# Patient Record
Sex: FEMALE | Race: WHITE | NOT HISPANIC OR LATINO | Employment: PART TIME | URBAN - METROPOLITAN AREA
[De-identification: names, ages, dates, MRNs, and addresses within clinical notes are randomized per-mention and may not be internally consistent; named-entity substitution may affect disease eponyms.]

---

## 2018-09-21 ENCOUNTER — TELEPHONE (OUTPATIENT)
Dept: NEUROLOGY | Facility: CLINIC | Age: 28
End: 2018-09-21

## 2018-12-11 ENCOUNTER — OFFICE VISIT (OUTPATIENT)
Dept: NEUROLOGY | Facility: CLINIC | Age: 28
End: 2018-12-11
Payer: COMMERCIAL

## 2018-12-11 VITALS
WEIGHT: 227 LBS | HEIGHT: 65 IN | DIASTOLIC BLOOD PRESSURE: 86 MMHG | BODY MASS INDEX: 37.82 KG/M2 | SYSTOLIC BLOOD PRESSURE: 134 MMHG | HEART RATE: 90 BPM

## 2018-12-11 DIAGNOSIS — G44.89 OTHER HEADACHE SYNDROME: ICD-10-CM

## 2018-12-11 DIAGNOSIS — G35 MS (MULTIPLE SCLEROSIS) (HCC): ICD-10-CM

## 2018-12-11 DIAGNOSIS — G37.9 CNS DEMYELINATING DISEASE (HCC): Primary | ICD-10-CM

## 2018-12-11 PROCEDURE — 99244 OFF/OP CNSLTJ NEW/EST MOD 40: CPT | Performed by: PSYCHIATRY & NEUROLOGY

## 2018-12-11 RX ORDER — DESOGESTREL AND ETHINYL ESTRADIOL 7 DAYS X 3
1 KIT ORAL DAILY
COMMUNITY
Start: 2018-12-10

## 2018-12-11 RX ORDER — LEVOTHYROXINE SODIUM 0.07 MG/1
TABLET ORAL
COMMUNITY
Start: 2018-11-04 | End: 2018-12-11 | Stop reason: SDUPTHER

## 2018-12-11 RX ORDER — GABAPENTIN 100 MG/1
100 CAPSULE ORAL
Qty: 30 CAPSULE | Refills: 5 | Status: SHIPPED | OUTPATIENT
Start: 2018-12-11 | End: 2019-06-15 | Stop reason: SDUPTHER

## 2018-12-11 RX ORDER — LEVOTHYROXINE SODIUM 0.05 MG/1
50 TABLET ORAL DAILY
COMMUNITY
Start: 2018-11-19 | End: 2022-07-22 | Stop reason: SDUPTHER

## 2018-12-11 RX ORDER — SERTRALINE HYDROCHLORIDE 100 MG/1
TABLET, FILM COATED ORAL
COMMUNITY
Start: 2018-10-22 | End: 2018-12-27 | Stop reason: SDUPTHER

## 2018-12-11 RX ORDER — IBUPROFEN 200 MG
TABLET ORAL
COMMUNITY
End: 2019-07-11 | Stop reason: ALTCHOICE

## 2018-12-11 RX ORDER — MELOXICAM 15 MG/1
15 TABLET ORAL DAILY PRN
COMMUNITY
End: 2022-04-08

## 2018-12-11 RX ORDER — METFORMIN HYDROCHLORIDE 500 MG/1
500 TABLET, EXTENDED RELEASE ORAL
COMMUNITY
Start: 2018-11-20 | End: 2021-11-09 | Stop reason: SDUPTHER

## 2018-12-11 RX ORDER — HYDROCODONE BITARTRATE AND ACETAMINOPHEN 5; 325 MG/1; MG/1
1 TABLET ORAL
COMMUNITY
Start: 2015-04-08 | End: 2019-07-11 | Stop reason: ALTCHOICE

## 2018-12-11 NOTE — PROGRESS NOTES
Raffy Coats is a 25 y o  female  Chief Complaint   Patient presents with    Multiple Sclerosis     New patient consult regarding MS  Assessment:  1  CNS demyelinating disease ([facility] )    2  MS (multiple sclerosis) ([facility] )    3  Other headache syndrome        Plan:    Discussion:  Patient with history of MS diagnosed in 2016, she was on Copaxone which she did not tolerate it, and hence she has not been on any medication for the last 2 years, she wants to be on natural medication and is using vitamin supplement, would recommend an MRI scan of the brain and C-spine with and without contrast, I explained to the patient regarding the importance of being on medication, she would not want to be on medication would would like to see an MS specialist, I am going to refer her to see Dr Dedra Morales, also patient to call me after the above test to discuss the result, differential diagnosis of headaches discussed with her most likely migraine headaches, she was advised to avoid migraine triggers, we discussed different medication options, since she is also having some numbness and tingling in her head hands and feet, would recommend Neurontin 100 mg at nighttime, side effects of the medication discussed with her, she will call me if has any worsening symptoms and go to the hospital otherwise to see me back in 2 months and follow up with her other physicians      Subjective:    HPI   Patient is here for evaluation of headaches and history of MS and numbness and tingling in her hands and feet, patient was diagnosed with MS in 2016 at [facility] and had an MRI scan of the brain and spinal tap, she was started on Copaxone but patient did not tolerate the Copaxone and hence she stopped and then she lost her insurance and she wanted to try natural vitamins, her initial presentation was that she was having spasm and numbness and tingling in the left arm and leg, since then she has not had any recurrence of the symptoms, she has complaints of headaches for last several years, they have been slightly getting worse, 3 to 4 times a week, 5-7 on 10 no bowel and bladder incontinence, no focal weakness, no other neurological complaints  Vitals:    12/11/18 1322   Weight: 103 kg (227 lb)   Height: 5' 5" (1 651 m)       Current Medications    Current Outpatient Prescriptions:     ibuprofen (MOTRIN) 200 mg tablet, Take by mouth, Disp: , Rfl:     levothyroxine 50 mcg tablet, , Disp: , Rfl:     meloxicam (MOBIC) 15 mg tablet, Take 15 mg by mouth daily, Disp: , Rfl:     metFORMIN (GLUCOPHAGE-XR) 500 mg 24 hr tablet,  , Disp: , Rfl:     VELIVET 0 1/0 125/0 15 -0 025 MG tablet, , Disp: , Rfl:     HYDROcodone-acetaminophen (NORCO) 5-325 mg per tablet, Take 1 tablet by mouth, Disp: , Rfl:     sertraline (ZOLOFT) 100 mg tablet, , Disp: , Rfl:       Allergies  Patient has no known allergies  Past Medical History  Past Medical History:   Diagnosis Date    Migraine     MS (multiple sclerosis) ([facility] )     Thyroid disease          Past Surgical History:  Past Surgical History:   Procedure Laterality Date    IR SPINE PROCEDURE           Family History:  Family History   Problem Relation Age of Onset    Diabetes Mother     Lupus Mother     Hypertension Mother     Cancer Maternal Grandmother     Heart failure Paternal Grandfather        Social History:   reports that she has been smoking  She uses smokeless tobacco  She reports that she does not drink alcohol or use drugs  I have reviewed the past medical history, surgical history, social and family history, current medications, allergies vitals, review of systems, and updated this information as appropriate today  Objective:    Physical Exam    Neurological Exam    GENERAL:  Cooperative in no acute distress  Well-developed and well-nourished    HEAD and NECK   Head is atraumatic normocephalic with no lesions or masses   Neck is supple with full range of motion    CARDIOVASCULAR  Carotid Arteries-no carotid bruits  NEUROLOGIC:  Mental Status-the patient is awake alert and oriented without aphasia or apraxia  Cranial Nerves: Visual fields are full to confrontation  Discs are flat  Limited exam as unable to dilate the pupils Extraocular movements are full without nystagmus  Pupils are 2-1/2 mm and reactive  Face is symmetrical to light touch  Movements of facial expression move symmetrically  Hearing is normal to finger rub bilaterally  Soft palate lifts symmetrically  Shoulder shrug is symmetrical  Tongue is midline without atrophy  Motor: No drift is noted on arm extension  Strength is full in the upper and lower extremities with normal bulk and tone  Sensory: Intact to temperature and vibratory sensation in the upper and lower extremities bilaterally  Cortical function is intact  Coordination: Finger to nose testing is performed accurately  Romberg is negative  Gait reveals a normal base with symmetrical arm swing  Reflexes:      2+ and symmetrical    Toes are downgoing  No meningeal signs no temporal artery tenderness,          ROS:  Review of Systems   Constitutional: Negative  Negative for appetite change and fever  HENT: Positive for congestion  Negative for hearing loss, tinnitus, trouble swallowing and voice change  Eyes: Negative  Negative for photophobia and pain  Respiratory: Negative  Negative for shortness of breath  Cardiovascular: Negative  Negative for palpitations  Gastrointestinal: Negative  Negative for nausea and vomiting  Endocrine: Negative  Negative for cold intolerance and heat intolerance  Genitourinary: Negative  Negative for dysuria, frequency and urgency  Musculoskeletal: Positive for back pain  Negative for myalgias and neck pain  Pain while walking, muscle pain   Skin: Positive for rash  Neurological: Positive for numbness (hands, fingers, toes) and headaches   Negative for dizziness, tremors, seizures, syncope, facial asymmetry, speech difficulty, weakness and light-headedness  Cramping, twitching, tingling   Hematological: Bruises/bleeds easily (bruising)  Psychiatric/Behavioral: Positive for sleep disturbance  Negative for confusion and hallucinations

## 2018-12-27 DIAGNOSIS — F41.9 ANXIETY AND DEPRESSION: Primary | ICD-10-CM

## 2018-12-27 DIAGNOSIS — F32.A ANXIETY AND DEPRESSION: Primary | ICD-10-CM

## 2018-12-27 RX ORDER — SERTRALINE HYDROCHLORIDE 100 MG/1
100 TABLET, FILM COATED ORAL DAILY
Qty: 30 TABLET | Refills: 11 | Status: SHIPPED | OUTPATIENT
Start: 2018-12-27 | End: 2019-07-11 | Stop reason: ALTCHOICE

## 2019-01-04 ENCOUNTER — HOSPITAL ENCOUNTER (OUTPATIENT)
Dept: MRI IMAGING | Facility: CLINIC | Age: 29
Discharge: HOME | End: 2019-01-04
Payer: COMMERCIAL

## 2019-01-04 DIAGNOSIS — G35 MS (MULTIPLE SCLEROSIS) (HCC): ICD-10-CM

## 2019-01-04 PROCEDURE — 70553 MRI BRAIN STEM W/O & W/DYE: CPT

## 2019-01-04 PROCEDURE — A9585 GADOBUTROL INJECTION: HCPCS | Performed by: PSYCHIATRY & NEUROLOGY

## 2019-01-04 PROCEDURE — 72156 MRI NECK SPINE W/O & W/DYE: CPT

## 2019-01-04 RX ADMIN — GADOBUTROL 10 ML: 604.72 INJECTION INTRAVENOUS at 13:45

## 2019-01-17 ENCOUNTER — OFFICE VISIT (OUTPATIENT)
Dept: NEUROLOGY | Facility: CLINIC | Age: 29
End: 2019-01-17
Payer: COMMERCIAL

## 2019-01-17 VITALS
HEART RATE: 89 BPM | HEIGHT: 65 IN | BODY MASS INDEX: 37.82 KG/M2 | SYSTOLIC BLOOD PRESSURE: 130 MMHG | WEIGHT: 227 LBS | DIASTOLIC BLOOD PRESSURE: 80 MMHG | RESPIRATION RATE: 16 BRPM

## 2019-01-17 DIAGNOSIS — G44.89 OTHER HEADACHE SYNDROME: ICD-10-CM

## 2019-01-17 DIAGNOSIS — G35 MS (MULTIPLE SCLEROSIS) (HCC): Primary | ICD-10-CM

## 2019-01-17 PROCEDURE — 99215 OFFICE O/P EST HI 40 MIN: CPT | Performed by: PSYCHIATRY & NEUROLOGY

## 2019-01-17 NOTE — PROGRESS NOTES
Patient ID: Gaye Zamudio is a 25 y o  female  Assessment/Plan:   Problem List Items Addressed This Visit        Nervous and Auditory    MS (multiple sclerosis) ([facility] ) - Primary       Other    Other headache syndrome           Today I had the pleasure of seeing your patient, Kermit Hernandez, in consultation at [address]  Mrs David Lamb has presented for evaluation of MS and related issues  She has been off DMT for almost 3 years  MRI Brain and cervical spine were completed - we personally reviewed her imaging   No comparison was made due to her initial imaging are at [facility]  Patient has total 18 lesion due to demyelination and migraines, and no cervical spine, with T4 demyelinating plaque reported in 2016  Patient agreed to consider Tecfidera as she was not able to tolerate copaxone in 2016  Form was signed, and she is to call us back  Patient started gabapentin  She was interested in medical marijuana - she was referred to a [employer] official website to address that question  No focal neurologic deficit noted on her today's evaluation  Follow up in 2 months  Subjective: MS and related issues  HPI/History of Present Illness  Patient was diagnosed with MS in 2016 when she presented with left-sided muscle spasms  LP was completed with 6 OCB at that time  Patient was on Copaxone for 1 week  Since 2016 she has not been on any DMT  Gabapentin 100 mg HS  Patient has been having tingling in her hands, no weakness and no vertigo  History of autoimmune disorders in both sides  Patient  has been having frequent migraines   Patient described headaches at times, but not today  Patient had a fever and cold sweats due to Copaxone  She has no muscle spasms now  No FHx of breast Ca  MRI brain 2/2016:Mild focal cord signal abnormality at the T4 level without associated enhancement  In the same location there is a dorsal indentation on the thoracic cord   This most likely reflects an arachnoid which can be associated with cord signal abnormality  A demyelinating plaque is considered less likely but cannot  be excluded in this patient with possible multiple sclerosis  2  Mild to moderate midthoracic spondylosis mainly from T6-T7 through T8-T9  No  central spinal canal stenosis or foraminal narrowing  The following portions of the patient's history were reviewed and updated as appropriate:   She  has a past medical history of Migraine; MS (multiple sclerosis) ([facility] ); and Thyroid disease  She   Patient Active Problem List    Diagnosis Date Noted    CNS demyelinating disease ([facility] ) 12/11/2018    MS (multiple sclerosis) ([facility] ) 12/11/2018    Other headache syndrome 12/11/2018     She  has a past surgical history that includes IR spine procedure  Her family history includes Cancer in her maternal grandmother; Diabetes in her mother; Heart failure in her paternal grandfather; Hypertension in her mother; Lupus in her mother  She  reports that she has been smoking  She uses smokeless tobacco  She reports that she does not drink alcohol or use drugs  Current Outpatient Prescriptions   Medication Sig Dispense Refill    gabapentin (NEURONTIN) 100 mg capsule Take 1 capsule (100 mg total) by mouth daily at bedtime 30 capsule 5    levothyroxine 50 mcg tablet       meloxicam (MOBIC) 15 mg tablet Take 15 mg by mouth daily      metFORMIN (GLUCOPHAGE-XR) 500 mg 24 hr tablet        VELIVET 0 1/0 125/0 15 -0 025 MG tablet       HYDROcodone-acetaminophen (NORCO) 5-325 mg per tablet Take 1 tablet by mouth      ibuprofen (MOTRIN) 200 mg tablet Take by mouth      sertraline (ZOLOFT) 100 mg tablet Take 1 tablet (100 mg total) by mouth daily (Patient not taking: Reported on 1/17/2019 ) 30 tablet 11     No current facility-administered medications for this visit        Current Outpatient Prescriptions on File Prior to Visit   Medication Sig    gabapentin (NEURONTIN) 100 mg capsule Take 1 capsule (100 mg total) by mouth daily at bedtime    levothyroxine 50 mcg tablet     meloxicam (MOBIC) 15 mg tablet Take 15 mg by mouth daily    metFORMIN (GLUCOPHAGE-XR) 500 mg 24 hr tablet      VELIVET 0 1/0 125/0 15 -0 025 MG tablet     HYDROcodone-acetaminophen (NORCO) 5-325 mg per tablet Take 1 tablet by mouth    ibuprofen (MOTRIN) 200 mg tablet Take by mouth    sertraline (ZOLOFT) 100 mg tablet Take 1 tablet (100 mg total) by mouth daily (Patient not taking: Reported on 1/17/2019 )     No current facility-administered medications on file prior to visit  She has No Known Allergies            Objective:    Blood pressure 130/80, pulse 89, resp  rate 16, height 5' 5" (1 651 m), weight 103 kg (227 lb)  Physical Exam/Neurological Exam  CONSTITUTIONAL: NAD, pleasant  NECK: supple, no lymphadenopathy, no thyromegaly, no JVD  CARDIOVASCULAR: RRR, normal S1S2, no murmurs, no rubs  RESP: clear to auscultation bilaterally, no wheezes/rhonchi/rales  ABDOMEN: soft, non tender, non distended  SKIN: no rash or skin lesions  EXTREMITIES: no edema, pulses 2+bilaterally  PSYCH: appropriate mood and affect  NEUROLOGIC COMPREHENSIVE EXAM: Patient is oriented to person, place and time, NAD; appropriate affect  CN II, III, IV, V, VI, VII,VIII,IX,X,XI-XII intact with EOMI, PERRLA, OKN intact, VF grossly intact, fundi poorly visualized secondary to pupillary constriction; symmetric face noted  Motor: 5/5 UE/LE bilateral symmetric; Sensory: intact to light touch and pinprick bilaterally; normal vibration sensation feet bilaterally; Coordination within normal limits on FTN and FERNANDO testing; DTR: 2/4 through, no Babinski, no clonus  Tandem gait is intact  Romberg: negative  ROS:  12 points of review of system was reviewed with the patient and was unremarkable with exception: see HPI  Review of Systems   Constitutional: Negative  HENT: Negative  Eyes: Negative  Respiratory: Negative  Cardiovascular: Negative  Gastrointestinal: Negative  Endocrine: Negative  Genitourinary: Negative  Musculoskeletal: Negative  Allergic/Immunologic: Negative  Neurological: Positive for numbness (R/L Hands) and headaches  Hematological: Negative  Psychiatric/Behavioral: Negative

## 2019-01-18 ENCOUNTER — TELEPHONE (OUTPATIENT)
Dept: NEUROLOGY | Facility: CLINIC | Age: 29
End: 2019-01-18

## 2019-01-24 NOTE — TELEPHONE ENCOUNTER
Patient now requesting to change to copaxone, she did some research on both meds and copaxone "seems better"  She is requesting we fax  rx to the specialty pharmacy (she couldn't remember the name of the pharm)  When I put in the fax number [place] [address]    fax [phone]   Phone [phone] x [phone]

## 2019-01-24 NOTE — TELEPHONE ENCOUNTER
Please consider sending Copaxone form to the patient for signature, cancel Tecfidera shipment if already started

## 2019-01-25 NOTE — TELEPHONE ENCOUNTER
Spoke with patient, she had cancelled her script with the pharmacy  Copaxone Start form mailed to patient's home  Advised her to sign and mail back as soon as possible

## 2019-01-25 NOTE — TELEPHONE ENCOUNTER
Glenna w/[employer] calling to confirm the patient does not need tecfidera  I did inform her the patient changed her mind and now wants to go on copaxone and the start form was mailed to patient  She verbalized understanding, no further questions

## 2019-01-29 NOTE — TELEPHONE ENCOUNTER
Glenna w/ [employer] called re: below  She will call back next week to f/u  Pt states that she did not receive copaxone start form yet  Advised pt that once she receive the form, she will need to sign and mail it back asap  Pt verbalized understanding             [phone] ext [phone]

## 2019-01-31 NOTE — TELEPHONE ENCOUNTER
[facility]  i made her aware that we are still waiting for pt to sign start form

## 2019-02-05 NOTE — TELEPHONE ENCOUNTER
Start form completed, signed by Dr Tiffany Bright and faxed to [facility] and [facility]

## 2019-02-07 DIAGNOSIS — G35 MS (MULTIPLE SCLEROSIS) (HCC): Primary | ICD-10-CM

## 2019-02-07 RX ORDER — GLATIRAMER 40 MG/ML
40 INJECTION, SOLUTION SUBCUTANEOUS 3 TIMES WEEKLY
Qty: 12 SYRINGE | Refills: 0 | Status: SHIPPED | OUTPATIENT
Start: 2019-02-08 | End: 2019-03-13 | Stop reason: SDUPTHER

## 2019-02-11 NOTE — TELEPHONE ENCOUNTER
Received a call from Hema Soto from [address] [place] [address] that they hadn't received the patient's rx for copaxone  Informed her the start form was sent to [facility] and [facility]  She was questioning why it wasn't sent to them

## 2019-02-11 NOTE — TELEPHONE ENCOUNTER
Was not sent directly to them because patient must be set up through [facility] to receive the injector and injection training prior to having medication shipped to her  They will forward to appropriate pharmacy

## 2019-03-13 DIAGNOSIS — G35 MS (MULTIPLE SCLEROSIS) (HCC): ICD-10-CM

## 2019-03-13 RX ORDER — GLATIRAMER 40 MG/ML
INJECTION, SOLUTION SUBCUTANEOUS
Qty: 12 ML | Refills: 0 | Status: SHIPPED | OUTPATIENT
Start: 2019-03-13 | End: 2019-03-15 | Stop reason: SDUPTHER

## 2019-03-15 DIAGNOSIS — G35 MS (MULTIPLE SCLEROSIS) (HCC): ICD-10-CM

## 2019-03-15 RX ORDER — GLATIRAMER 40 MG/ML
INJECTION, SOLUTION SUBCUTANEOUS
Qty: 12 ML | Refills: 0 | Status: SHIPPED | OUTPATIENT
Start: 2019-03-15 | End: 2019-05-30 | Stop reason: SDUPTHER

## 2019-04-03 ENCOUNTER — TELEPHONE (OUTPATIENT)
Dept: FAMILY MEDICINE CLINIC | Facility: CLINIC | Age: 29
End: 2019-04-03

## 2019-04-03 DIAGNOSIS — J01.00 ACUTE NON-RECURRENT MAXILLARY SINUSITIS: Primary | ICD-10-CM

## 2019-04-03 RX ORDER — CIPROFLOXACIN 500 MG/1
500 TABLET, FILM COATED ORAL EVERY 12 HOURS SCHEDULED
Qty: 20 TABLET | Refills: 0 | Status: SHIPPED | OUTPATIENT
Start: 2019-04-03 | End: 2019-04-13

## 2019-05-30 DIAGNOSIS — G35 MS (MULTIPLE SCLEROSIS) (HCC): ICD-10-CM

## 2019-05-30 RX ORDER — GLATIRAMER 40 MG/ML
INJECTION, SOLUTION SUBCUTANEOUS
Qty: 12 ML | Refills: 11 | Status: SHIPPED | OUTPATIENT
Start: 2019-05-30 | End: 2020-02-18 | Stop reason: SDUPTHER

## 2019-06-15 DIAGNOSIS — G35 MS (MULTIPLE SCLEROSIS) (HCC): ICD-10-CM

## 2019-06-17 RX ORDER — GABAPENTIN 100 MG/1
CAPSULE ORAL
Qty: 30 CAPSULE | Refills: 4 | Status: SHIPPED | OUTPATIENT
Start: 2019-06-17 | End: 2019-11-11 | Stop reason: SDUPTHER

## 2019-07-11 ENCOUNTER — OFFICE VISIT (OUTPATIENT)
Dept: NEUROLOGY | Facility: CLINIC | Age: 29
End: 2019-07-11
Payer: COMMERCIAL

## 2019-07-11 VITALS
HEIGHT: 65 IN | DIASTOLIC BLOOD PRESSURE: 70 MMHG | BODY MASS INDEX: 34.99 KG/M2 | SYSTOLIC BLOOD PRESSURE: 110 MMHG | HEART RATE: 80 BPM | WEIGHT: 210 LBS

## 2019-07-11 DIAGNOSIS — M54.50 CHRONIC BILATERAL LOW BACK PAIN WITHOUT SCIATICA: ICD-10-CM

## 2019-07-11 DIAGNOSIS — G37.9 CNS DEMYELINATING DISEASE (HCC): ICD-10-CM

## 2019-07-11 DIAGNOSIS — G89.29 CHRONIC BILATERAL LOW BACK PAIN WITHOUT SCIATICA: ICD-10-CM

## 2019-07-11 DIAGNOSIS — G35 MS (MULTIPLE SCLEROSIS) (HCC): Primary | ICD-10-CM

## 2019-07-11 PROCEDURE — 99214 OFFICE O/P EST MOD 30 MIN: CPT | Performed by: PSYCHIATRY & NEUROLOGY

## 2019-07-11 NOTE — PROGRESS NOTES
Shonda Naqvi is a 30 y o  female  Chief Complaint   Patient presents with    Follow-up     history of MS    Back Pain       Assessment:  1  MS (multiple sclerosis) ([facility] )    2  CNS demyelinating disease ([facility] )    3  Chronic bilateral low back pain without sciatica          Discussion:  Patient is currently on Copaxone which is being managed by Dr Moose Russo, she was advised to keep an eye on of injection sites as occasionally she might have a slight bruise, also was advised to get her blood work monitored periodically, would recommend an MRI scan of the lumbar spine to evaluate for the low back pain, patient to call me after the test to discuss the results, she was also advised to go for physical therapy, to avoid strenuous activity, continue with Mobic on as-needed basis, avoid ibuprofen, as she is using Mobic, also advised her to avoid tramadol, she is not keen to increase Neurontin, go to the hospital if has any worsening symptoms and call me otherwise to see me back in 3 months and follow up with her other physicians  Subjective:    HPI   Patient is here in follow-up for history of MS and low back pain, since her last visit she was seen by Dr Moose Russo, and was started on Copaxone, patient continues to take that and is doing well, except that very rarely she would have some numbness and tingling in her fingertips, she has a slight bruise at times at the injection site but nothing major, she has been complaining of low back pain which she has had for a long time and has tried physical therapy in the past but it was doing well and recently she has been having worsening low back pain, she denies having any trauma, pain is 9 on 10 without radiation to the legs, is relieved with Tylenol and occasionally she might take a tramadol, no bowel and bladder incontinence, no focal weakness, no other neurological complaints      Vitals:    07/11/19 1501   BP: 110/70   BP Location: Left arm   Patient Position: Sitting Cuff Size: Adult   Pulse: 80   Weight: 95 3 kg (210 lb)   Height: 5' 5" (1 651 m)       Current Medications    Current Outpatient Medications:     gabapentin (NEURONTIN) 100 mg capsule, TAKE ONE CAPSULE BY MOUTH EVERY DAY AT BEDTIME , Disp: 30 capsule, Rfl: 4    Glatiramer Acetate 40 MG/ML SOSY, INJECT 40 MG UNDER THE SKIN 3 TIMES A WEEK, Disp: 12 mL, Rfl: 11    levothyroxine 50 mcg tablet, Take 50 mcg by mouth daily , Disp: , Rfl:     meloxicam (MOBIC) 15 mg tablet, Take 15 mg by mouth daily as needed , Disp: , Rfl:     metFORMIN (GLUCOPHAGE-XR) 500 mg 24 hr tablet,  , Disp: , Rfl:     VELIVET 0 1/0 125/0 15 -0 025 MG tablet, Take 1 tablet by mouth daily , Disp: , Rfl:       Allergies  Patient has no known allergies  Past Medical History  Past Medical History:   Diagnosis Date    Migraine     MS (multiple sclerosis) [facility] )     Thyroid disease          Past Surgical History:  Past Surgical History:   Procedure Laterality Date    IR SPINE PROCEDURE           Family History:  Family History   Problem Relation Age of Onset    Diabetes Mother     Lupus Mother     Hypertension Mother     Cancer Maternal Grandmother     Heart failure Paternal Grandfather        Social History:   reports that she has been smoking  She uses smokeless tobacco  She reports that she does not drink alcohol or use drugs  I have reviewed the past medical history, surgical history, social and family history, current medications, allergies vitals, review of systems, and updated this information as appropriate today  Objective:    Physical Exam    Neurological Exam    GENERAL:  Cooperative in no acute distress  Well-developed and well-nourished    HEAD and NECK   Head is atraumatic normocephalic with no lesions or masses  Neck is supple with full range of motion    CARDIOVASCULAR  Carotid Arteries-no carotid bruits      NEUROLOGIC:  Mental Status-the patient is awake alert and oriented without aphasia or apraxia  Cranial Nerves: Visual fields are full to confrontation  Extraocular movements are full without nystagmus  Pupils are 2-1/2 mm and reactive  Face is symmetrical to light touch  Movements of facial expression move symmetrically  Hearing is normal to finger rub bilaterally  Soft palate lifts symmetrically  Shoulder shrug is symmetrical  Tongue is midline without atrophy  Motor: No drift is noted on arm extension  Strength is full in the upper and lower extremities with normal bulk and tone  Sensory: Intact to temperature and vibratory sensation in the upper and lower extremities bilaterally  Cortical function is intact  Coordination: Finger to nose testing is performed accurately  Romberg is negative  Gait reveals a normal base with symmetrical arm swing  Tandem walk is normal   Reflexes:   1+ and symmetrical   Paraspinal muscle tenderness in the lumbar area          ROS:  Review of Systems   Constitutional: Negative  Negative for appetite change, chills and fever  HENT: Negative  Negative for hearing loss, tinnitus, trouble swallowing and voice change  Eyes: Negative  Negative for photophobia and pain  Respiratory: Negative  Negative for shortness of breath and wheezing  Cardiovascular: Negative  Negative for chest pain and palpitations  Gastrointestinal: Negative  Negative for nausea and vomiting  Endocrine: Negative  Negative for cold intolerance and heat intolerance  Genitourinary: Negative  Negative for dysuria, frequency and urgency  Musculoskeletal: Positive for back pain, gait problem, neck pain and neck stiffness  Negative for myalgias  Skin: Negative  Negative for rash  Allergic/Immunologic: Negative  Neurological: Positive for light-headedness, numbness (hands) and headaches  Negative for dizziness, tremors, seizures, syncope, facial asymmetry, speech difficulty and weakness  Hematological: Negative  Does not bruise/bleed easily     Psychiatric/Behavioral: Negative for confusion, hallucinations and sleep disturbance

## 2019-09-13 ENCOUNTER — HOSPITAL ENCOUNTER (OUTPATIENT)
Dept: MRI IMAGING | Facility: CLINIC | Age: 29
Discharge: HOME | End: 2019-09-13
Payer: COMMERCIAL

## 2019-09-13 DIAGNOSIS — M54.50 CHRONIC BILATERAL LOW BACK PAIN WITHOUT SCIATICA: ICD-10-CM

## 2019-09-13 DIAGNOSIS — G89.29 CHRONIC BILATERAL LOW BACK PAIN WITHOUT SCIATICA: ICD-10-CM

## 2019-09-13 PROCEDURE — 72158 MRI LUMBAR SPINE W/O & W/DYE: CPT

## 2019-09-13 PROCEDURE — A9585 GADOBUTROL INJECTION: HCPCS | Performed by: PSYCHIATRY & NEUROLOGY

## 2019-09-13 RX ADMIN — GADOBUTROL 9 ML: 604.72 INJECTION INTRAVENOUS at 10:58

## 2019-10-21 ENCOUNTER — OFFICE VISIT (OUTPATIENT)
Dept: FAMILY MEDICINE CLINIC | Facility: CLINIC | Age: 29
End: 2019-10-21
Payer: COMMERCIAL

## 2019-10-21 ENCOUNTER — TELEPHONE (OUTPATIENT)
Dept: FAMILY MEDICINE CLINIC | Facility: CLINIC | Age: 29
End: 2019-10-21

## 2019-10-21 VITALS
HEART RATE: 78 BPM | BODY MASS INDEX: 34.16 KG/M2 | WEIGHT: 205 LBS | TEMPERATURE: 98.7 F | HEIGHT: 65 IN | SYSTOLIC BLOOD PRESSURE: 128 MMHG | OXYGEN SATURATION: 95 % | DIASTOLIC BLOOD PRESSURE: 78 MMHG

## 2019-10-21 DIAGNOSIS — J02.9 SORE THROAT: Primary | ICD-10-CM

## 2019-10-21 DIAGNOSIS — J45.21 MILD INTERMITTENT ASTHMATIC BRONCHITIS WITH ACUTE EXACERBATION: ICD-10-CM

## 2019-10-21 PROBLEM — J45.901 ASTHMATIC BRONCHITIS WITH ACUTE EXACERBATION: Status: ACTIVE | Noted: 2019-10-21

## 2019-10-21 LAB — S PYO AG THROAT QL: NEGATIVE

## 2019-10-21 PROCEDURE — 3008F BODY MASS INDEX DOCD: CPT | Performed by: FAMILY MEDICINE

## 2019-10-21 PROCEDURE — 99213 OFFICE O/P EST LOW 20 MIN: CPT | Performed by: FAMILY MEDICINE

## 2019-10-21 PROCEDURE — 87880 STREP A ASSAY W/OPTIC: CPT | Performed by: FAMILY MEDICINE

## 2019-10-21 RX ORDER — AZITHROMYCIN 500 MG/1
500 TABLET, FILM COATED ORAL DAILY
Qty: 5 TABLET | Refills: 1 | Status: SHIPPED | OUTPATIENT
Start: 2019-10-21 | End: 2019-10-26

## 2019-10-21 RX ORDER — ALBUTEROL SULFATE 90 UG/1
2 AEROSOL, METERED RESPIRATORY (INHALATION) EVERY 6 HOURS PRN
Qty: 1 INHALER | Refills: 1 | Status: SHIPPED | OUTPATIENT
Start: 2019-10-21 | End: 2022-08-05 | Stop reason: ALTCHOICE

## 2019-10-21 NOTE — TELEPHONE ENCOUNTER
I do not see a note on this patient within the past year there is nothing in epic she can come in for an appointment for this so that we can register her and give her an antibiotic  She had been a patient with me for a long time    She can come in for an appointment with Delma for this

## 2019-10-21 NOTE — PATIENT INSTRUCTIONS
Acute Cough   AMBULATORY CARE:   An acute cough  can last up to 3 weeks  Common causes of an acute cough include a cold, allergies, or a lung infection  Seek care immediately if:   · You have trouble breathing or feel short of breath  · You cough up blood, or you see blood in your mucus  · You faint or feel weak or dizzy  · You have chest pain when you cough or take a deep breath  · You have new wheezing  Contact your healthcare provider if:   · You have a fever  · Your cough lasts longer than 4 weeks  · Your symptoms do not improve with treatment  · You have questions or concerns about your condition or care  Treatment:  An acute cough usually goes away on its own  Ask your healthcare provider about medicines you can take to decrease your cough  You may need medicine to stop the cough, decrease swelling in your airways, or help open your airways  Medicine may also be given to help you cough up mucus  If you have an infection caused by bacteria, you may need antibiotics  Manage your symptoms:   · Do not smoke and stay away from others who smoke  Nicotine and other chemicals in cigarettes and cigars can cause lung damage and make your cough worse  Ask your healthcare provider for information if you currently smoke and need help to quit  E-cigarettes or smokeless tobacco still contain nicotine  Talk to your healthcare provider before you use these products  · Drink extra liquids as directed  Liquids will help thin and loosen mucus so you can cough it up  Liquids will also help prevent dehydration  Examples of good liquids to drink include water, fruit juice, and broth  Do not drink liquids that contain caffeine  Caffeine can increase your risk for dehydration  Ask your healthcare provider how much liquid to drink each day  · Rest as directed  Do not do activities that make your cough worse, such as exercise  · Use a humidifier or vaporizer    Use a cool mist humidifier or a vaporizer to increase air moisture in your home  This may make it easier for you to breathe and help decrease your cough  · Eat 2 to 5 mL of honey 2 times each day  Honey can help thin mucus and decrease your cough  · Use cough drops or lozenges  These can help decrease throat irritation and your cough  Follow up with your healthcare provider as directed:  Write down your questions so you remember to ask them during your visits  © 2017 [employer] [facility] Information is for End User's use only and may not be sold, redistributed or otherwise used for commercial purposes  All illustrations and images included in CareNotes® are the copyrighted property of A D A M [employer] Inc  or [employer]  The above information is an  only  It is not intended as medical advice for individual conditions or treatments  Talk to your doctor, nurse or pharmacist before following any medical regimen to see if it is safe and effective for you
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
## 2019-10-21 NOTE — TELEPHONE ENCOUNTER
PT asking for a antibiotic, thinks that she has strep throat, she also has a cough, has had symptoms since Sunday and getting worse  Please send to [facility] in [place]
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
## 2019-11-11 DIAGNOSIS — G35 MS (MULTIPLE SCLEROSIS) (HCC): ICD-10-CM

## 2019-11-11 RX ORDER — GABAPENTIN 100 MG/1
CAPSULE ORAL
Qty: 30 CAPSULE | Refills: 3 | Status: SHIPPED | OUTPATIENT
Start: 2019-11-11 | End: 2020-08-18

## 2020-01-08 ENCOUNTER — TELEPHONE (OUTPATIENT)
Dept: NEUROLOGY | Facility: CLINIC | Age: 30
End: 2020-01-08

## 2020-01-08 NOTE — TELEPHONE ENCOUNTER
Received notification that Glatiramer 40mg requires PA renewal  Submitted via covermymeds through [employer]  A/w determination

## 2020-02-17 ENCOUNTER — OFFICE VISIT (OUTPATIENT)
Dept: FAMILY MEDICINE CLINIC | Facility: CLINIC | Age: 30
End: 2020-02-17
Payer: COMMERCIAL

## 2020-02-17 VITALS
TEMPERATURE: 98.6 F | HEART RATE: 89 BPM | BODY MASS INDEX: 37.62 KG/M2 | HEIGHT: 65 IN | WEIGHT: 225.8 LBS | OXYGEN SATURATION: 98 % | DIASTOLIC BLOOD PRESSURE: 84 MMHG | SYSTOLIC BLOOD PRESSURE: 126 MMHG

## 2020-02-17 DIAGNOSIS — E28.2 POLYCYSTIC OVARIAN SYNDROME: ICD-10-CM

## 2020-02-17 DIAGNOSIS — M54.50 CHRONIC BILATERAL LOW BACK PAIN WITHOUT SCIATICA: ICD-10-CM

## 2020-02-17 DIAGNOSIS — G44.89 OTHER HEADACHE SYNDROME: ICD-10-CM

## 2020-02-17 DIAGNOSIS — G35 MS (MULTIPLE SCLEROSIS) (HCC): ICD-10-CM

## 2020-02-17 DIAGNOSIS — G89.29 CHRONIC BILATERAL LOW BACK PAIN WITHOUT SCIATICA: ICD-10-CM

## 2020-02-17 DIAGNOSIS — Z00.00 ANNUAL PHYSICAL EXAM: ICD-10-CM

## 2020-02-17 DIAGNOSIS — G37.9 CNS DEMYELINATING DISEASE (HCC): ICD-10-CM

## 2020-02-17 DIAGNOSIS — J45.21 MILD INTERMITTENT ASTHMATIC BRONCHITIS WITH ACUTE EXACERBATION: ICD-10-CM

## 2020-02-17 DIAGNOSIS — E03.9 HYPOTHYROIDISM, UNSPECIFIED TYPE: ICD-10-CM

## 2020-02-17 DIAGNOSIS — Z11.4 SCREENING FOR HIV (HUMAN IMMUNODEFICIENCY VIRUS): Primary | ICD-10-CM

## 2020-02-17 PROCEDURE — 99395 PREV VISIT EST AGE 18-39: CPT | Performed by: FAMILY MEDICINE

## 2020-02-17 PROCEDURE — 3008F BODY MASS INDEX DOCD: CPT | Performed by: FAMILY MEDICINE

## 2020-02-17 RX ORDER — TOPIRAMATE 25 MG/1
25 CAPSULE, COATED PELLETS ORAL 2 TIMES DAILY
Qty: 60 CAPSULE | Refills: 3 | Status: SHIPPED | OUTPATIENT
Start: 2020-02-17

## 2020-02-17 NOTE — ASSESSMENT & PLAN NOTE
Annual physical exam completed at this time reviewed all medical problems and completed FMLA paperwork for her job  I will order laboratory work at this time and follow up with her at next scheduled office visit    She asked for review of her MRI of both the brain cervical and lumbar spine this was discussed at this time the brain MRI needs to be compared to her previous MRI not available at this time

## 2020-02-17 NOTE — ASSESSMENT & PLAN NOTE
Central nervous system demyelinating disease with multiple sclerosis patient will continue with her current neurological evaluation and treatment by Neurology and continue with her current medications including gabapentin and Glatiramer acetate

## 2020-02-17 NOTE — ASSESSMENT & PLAN NOTE
Occasional headache syndrome multifactorial continue with current medications follow-up here if needed or with Neurology    Continue with current medication plan as scheduled patient is chronically fatigued sleeping more frequently coming home from work taking a nap as a result of headaches the headache does resolve slightly after sleeping but she states it happens on the weekends as well at this point try Topamax 25 mg at night for the next week and then increase to twice daily if needed and let me know from that point how things are going and follow up with her neurologist if this does not resolve

## 2020-02-17 NOTE — PROGRESS NOTES
Assessment/Plan:       Problem List Items Addressed This Visit        Endocrine    Hypothyroidism     Hypothyroidism continue with levothyroxine at this time 50 mcg check TSH T4 every 6 months         Relevant Orders    TSH, 3rd generation with Free T4 reflex    Polycystic ovarian syndrome     Polycystic ovarian syndrome stable patient follows with obstetric and Gynecology and continues on metformin 500 mg extended release 4 tablets daily total of 2000 mg daily         Relevant Orders    Vitamin B12    Vitamin D 25 hydroxy       Respiratory    Asthmatic bronchitis with acute exacerbation       Nervous and Auditory    CNS demyelinating disease ([facility] [place] [address] )     Central nervous system demyelinating disease with multiple sclerosis patient will continue with her current neurological evaluation and treatment by Neurology and continue with her current medications including gabapentin and Glatiramer acetate  MS (multiple sclerosis) ([facility])    Relevant Orders    CBC and differential    Comprehensive metabolic panel    Lipid panel       Other    Other headache syndrome     Occasional headache syndrome multifactorial continue with current medications follow-up here if needed or with Neurology    Continue with current medication plan as scheduled patient is chronically fatigued sleeping more frequently coming home from work taking a nap as a result of headaches the headache does resolve slightly after sleeping but she states it happens on the weekends as well at this point try Topamax 25 mg at night for the next week and then increase to twice daily if needed and let me know from that point how things are going and follow up with her neurologist if this does not resolve         Chronic bilateral low back pain without sciatica     Chronic low back pain with occasional exacerbations and flare ups rendering her out of work at periods of time due to multiple medical problems including her job and the physical demands that it creates for her  She follows up with Neurology she will follow up with me as needed  Annual physical exam     Annual physical exam completed at this time reviewed all medical problems and completed FMLA paperwork for her job  I will order laboratory work at this time and follow up with her at next scheduled office visit  She asked for review of her MRI of both the brain cervical and lumbar spine this was discussed at this time the brain MRI needs to be compared to her previous MRI not available at this time           Other Visit Diagnoses     Screening for HIV (human immunodeficiency virus)    -  Primary    Relevant Orders    HIV 1/2 AG-AB combo            Subjective:      Patient ID: Terry Burns is a 30 y o  female  Patient presents for follow-up general evaluation and FMLA paperwork for multiple medical problems requiring medical visits with it Neurology and Obstetrics and Gynecology  Patient notes that she has been excessively tired recently and recently more headaches on a daily basis      The following portions of the patient's history were reviewed and updated as appropriate: allergies, current medications, past family history, past medical history, past social history, past surgical history and problem list     Review of Systems   Constitutional: Negative for chills, fatigue and fever  HENT: Negative for congestion, nosebleeds, rhinorrhea, sinus pressure and sore throat  Eyes: Negative for discharge and redness  Respiratory: Negative for cough and shortness of breath  Cardiovascular: Negative for chest pain, palpitations and leg swelling  Gastrointestinal: Negative for abdominal pain, blood in stool and nausea  Endocrine: Negative for cold intolerance, heat intolerance and polyuria  Genitourinary: Negative for dysuria and frequency  Musculoskeletal: Positive for arthralgias and back pain  Negative for myalgias  Skin: Negative for rash     Neurological: Positive for weakness and headaches  Negative for dizziness  Hematological: Negative for adenopathy  Psychiatric/Behavioral: Negative for behavioral problems and sleep disturbance  The patient is not nervous/anxious  Objective:      /84   Pulse 89   Temp 98 6 °F (37 °C)   Ht 5' 5" (1 651 m)   Wt 102 kg (225 lb 12 8 oz)   SpO2 98%   BMI 37 58 kg/m²        Physical Exam   Constitutional: She is oriented to person, place, and time  She appears well-developed and well-nourished  No distress  HENT:   Head: Normocephalic and atraumatic  Right Ear: External ear normal    Left Ear: External ear normal    Nose: Nose normal    Mouth/Throat: Oropharynx is clear and moist  No oropharyngeal exudate  Eyes: Pupils are equal, round, and reactive to light  Conjunctivae and EOM are normal  Right eye exhibits no discharge  Left eye exhibits no discharge  No scleral icterus  Neck: Normal range of motion  No JVD present  No thyromegaly present  Cardiovascular: Normal rate, regular rhythm and normal heart sounds  No murmur heard  Pulmonary/Chest: Effort normal  She has no wheezes  She has no rales  She exhibits no tenderness  Abdominal: Soft  Bowel sounds are normal  She exhibits no distension and no mass  There is no tenderness  Musculoskeletal: Normal range of motion  She exhibits no edema, tenderness or deformity  Back pain lumbar 1 through 5   Lymphadenopathy:     She has no cervical adenopathy  Neurological: She is alert and oriented to person, place, and time  She has normal reflexes  She displays normal reflexes  No cranial nerve deficit  Coordination normal    Skin: Skin is warm and dry  No rash noted  Psychiatric: She has a normal mood and affect  Her behavior is normal  Judgment and thought content normal    Nursing note and vitals reviewed         Data:    Laboratory Results: I have personally reviewed the pertinent laboratory results/reports   Radiology/Other Diagnostic Testing Results: I have personally reviewed pertinent reports         Lab Results   Component Value Date    WBC 15 28 (H) 04/01/2014    HGB 12 1 04/01/2014    HCT 35 6 04/01/2014    MCV 90 04/01/2014     04/01/2014     Lab Results   Component Value Date     (L) 03/16/2014    K 3 5 03/16/2014     03/16/2014    CO2 25 03/16/2014    ANIONGAP 10 03/16/2014    BUN 11 03/16/2014    CREATININE 0 93 03/16/2014    GLUCOSE 104 03/16/2014    CALCIUM 10 0 03/16/2014     No results found for: CHOLESTEROL  No results found for: HDL  No results found for: LDLCALC  No results found for: TRIG  No results found for: CHOLHDL  No results found for: [id number], TSH  No results found for: HGBA1C  No results found for: PSA    [facility], DO

## 2020-02-17 NOTE — PROGRESS NOTES
BMI Counseling: Body mass index is 37 58 kg/m²  The BMI is above normal  Nutrition recommendations include reducing fast food intake, consuming healthier snacks and decreasing soda and/or juice intake  Exercise recommendations include joining a gym and strength training exercises

## 2020-02-17 NOTE — ASSESSMENT & PLAN NOTE
Polycystic ovarian syndrome stable patient follows with obstetric and Gynecology and continues on metformin 500 mg extended release 4 tablets daily total of 2000 mg daily

## 2020-02-17 NOTE — ASSESSMENT & PLAN NOTE
Chronic low back pain with occasional exacerbations and flare ups rendering her out of work at periods of time due to multiple medical problems including her job and the physical demands that it creates for her  She follows up with Neurology she will follow up with me as needed

## 2020-02-18 DIAGNOSIS — G35 MS (MULTIPLE SCLEROSIS) (HCC): ICD-10-CM

## 2020-02-18 RX ORDER — GLATIRAMER 40 MG/ML
INJECTION, SOLUTION SUBCUTANEOUS
Qty: 12 ML | Refills: 11 | Status: SHIPPED | OUTPATIENT
Start: 2020-02-18 | End: 2021-12-08

## 2020-02-18 NOTE — TELEPHONE ENCOUNTER
Patient asking for glatiramer to be sent to [facility] specialty pharmacy as she had a change in insurance  She states insurance did change over a month ago, now has [employer] by Cynthia  Approval on file is from [employer] until 1/8/21  Patient reports she has been out of her glatiramer for over 1 month  Asking for this to be sent asap  Would like a call back when sent so she can contact the pharmacy       [phone]  Okay to leave detailed message

## 2020-02-19 ENCOUNTER — TELEPHONE (OUTPATIENT)
Dept: FAMILY MEDICINE CLINIC | Facility: CLINIC | Age: 30
End: 2020-02-19

## 2020-02-19 DIAGNOSIS — G44.89 OTHER HEADACHE SYNDROME: ICD-10-CM

## 2020-02-19 RX ORDER — TOPIRAMATE 25 MG/1
25 TABLET ORAL 2 TIMES DAILY
Qty: 180 TABLET | Refills: 1 | Status: SHIPPED | OUTPATIENT
Start: 2020-02-19 | End: 2020-02-24 | Stop reason: SDUPTHER

## 2020-02-19 NOTE — TELEPHONE ENCOUNTER
Pt states that the topiramate (TOPAMAX) 25 mg sprinkle capsule, they do have the regular tablets send advise or send in  [facility]

## 2020-02-24 DIAGNOSIS — G44.89 OTHER HEADACHE SYNDROME: ICD-10-CM

## 2020-02-24 RX ORDER — TOPIRAMATE 25 MG/1
25 TABLET ORAL 2 TIMES DAILY
Qty: 180 TABLET | Refills: 1 | Status: SHIPPED | OUTPATIENT
Start: 2020-02-24 | End: 2021-07-23

## 2020-03-23 ENCOUNTER — TELEPHONE (OUTPATIENT)
Dept: FAMILY MEDICINE CLINIC | Facility: CLINIC | Age: 30
End: 2020-03-23

## 2020-03-23 ENCOUNTER — TELEMEDICINE (OUTPATIENT)
Dept: FAMILY MEDICINE CLINIC | Facility: CLINIC | Age: 30
End: 2020-03-23
Payer: COMMERCIAL

## 2020-03-23 DIAGNOSIS — G35 MS (MULTIPLE SCLEROSIS) (HCC): Primary | ICD-10-CM

## 2020-03-23 DIAGNOSIS — G37.9 CNS DEMYELINATING DISEASE (HCC): ICD-10-CM

## 2020-03-23 PROBLEM — L30.9 DERMATITIS: Status: ACTIVE | Noted: 2020-03-23

## 2020-03-23 PROCEDURE — G2012 BRIEF CHECK IN BY MD/QHP: HCPCS | Performed by: FAMILY MEDICINE
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
## 2020-03-23 NOTE — TELEPHONE ENCOUNTER
This patient due to underlying medical condition should remain out of work at this time and return to work on April 6th due to high risk for corona virus

## 2020-03-23 NOTE — TELEPHONE ENCOUNTER
Patient requesting note per your conversation about not able to work at this time  Please adivse   What should it state

## 2020-03-31 ENCOUNTER — TELEPHONE (OUTPATIENT)
Dept: FAMILY MEDICINE CLINIC | Facility: CLINIC | Age: 30
End: 2020-03-31

## 2020-03-31 ENCOUNTER — DOCUMENTATION (OUTPATIENT)
Dept: FAMILY MEDICINE CLINIC | Facility: CLINIC | Age: 30
End: 2020-03-31

## 2020-04-13 ENCOUNTER — TELEMEDICINE (OUTPATIENT)
Dept: FAMILY MEDICINE CLINIC | Facility: CLINIC | Age: 30
End: 2020-04-13
Payer: COMMERCIAL

## 2020-04-13 DIAGNOSIS — E28.2 POLYCYSTIC OVARIAN SYNDROME: ICD-10-CM

## 2020-04-13 DIAGNOSIS — G35 MS (MULTIPLE SCLEROSIS) (HCC): ICD-10-CM

## 2020-04-13 DIAGNOSIS — E03.9 HYPOTHYROIDISM, UNSPECIFIED TYPE: Primary | ICD-10-CM

## 2020-04-13 PROCEDURE — G2012 BRIEF CHECK IN BY MD/QHP: HCPCS | Performed by: FAMILY MEDICINE

## 2020-05-01 ENCOUNTER — TELEMEDICINE (OUTPATIENT)
Dept: FAMILY MEDICINE CLINIC | Facility: CLINIC | Age: 30
End: 2020-05-01
Payer: COMMERCIAL

## 2020-05-01 DIAGNOSIS — J45.21 MILD INTERMITTENT ASTHMATIC BRONCHITIS WITH ACUTE EXACERBATION: ICD-10-CM

## 2020-05-01 DIAGNOSIS — E03.9 HYPOTHYROIDISM, UNSPECIFIED TYPE: Primary | ICD-10-CM

## 2020-05-01 DIAGNOSIS — G35 MS (MULTIPLE SCLEROSIS) (HCC): ICD-10-CM

## 2020-05-01 PROCEDURE — 99215 OFFICE O/P EST HI 40 MIN: CPT | Performed by: FAMILY MEDICINE

## 2020-08-17 DIAGNOSIS — G35 MS (MULTIPLE SCLEROSIS) (HCC): ICD-10-CM

## 2020-08-18 RX ORDER — GABAPENTIN 100 MG/1
CAPSULE ORAL
Qty: 30 CAPSULE | Refills: 0 | Status: SHIPPED | OUTPATIENT
Start: 2020-08-18 | End: 2021-12-08

## 2020-10-02 ENCOUNTER — OFFICE VISIT (OUTPATIENT)
Dept: URGENT CARE | Facility: CLINIC | Age: 30
End: 2020-10-02
Payer: COMMERCIAL

## 2020-10-02 VITALS
DIASTOLIC BLOOD PRESSURE: 75 MMHG | OXYGEN SATURATION: 97 % | SYSTOLIC BLOOD PRESSURE: 130 MMHG | BODY MASS INDEX: 34.15 KG/M2 | HEIGHT: 64 IN | RESPIRATION RATE: 18 BRPM | WEIGHT: 200 LBS | TEMPERATURE: 98.5 F | HEART RATE: 81 BPM

## 2020-10-02 DIAGNOSIS — Z20.822 EXPOSURE TO COVID-19 VIRUS: Primary | ICD-10-CM

## 2020-10-02 DIAGNOSIS — Z20.822 EXPOSURE TO COVID-19 VIRUS: ICD-10-CM

## 2020-10-02 PROCEDURE — G0382 LEV 3 HOSP TYPE B ED VISIT: HCPCS | Performed by: PHYSICIAN ASSISTANT

## 2020-10-02 PROCEDURE — U0003 INFECTIOUS AGENT DETECTION BY NUCLEIC ACID (DNA OR RNA); SEVERE ACUTE RESPIRATORY SYNDROME CORONAVIRUS 2 (SARS-COV-2) (CORONAVIRUS DISEASE [COVID-19]), AMPLIFIED PROBE TECHNIQUE, MAKING USE OF HIGH THROUGHPUT TECHNOLOGIES AS DESCRIBED BY CMS-2020-01-R: HCPCS | Performed by: PHYSICIAN ASSISTANT

## 2020-10-04 LAB — SARS-COV-2 RNA SPEC QL NAA+PROBE: NOT DETECTED

## 2020-12-09 ENCOUNTER — TELEPHONE (OUTPATIENT)
Dept: NEUROLOGY | Facility: CLINIC | Age: 30
End: 2020-12-09

## 2020-12-11 ENCOUNTER — OFFICE VISIT (OUTPATIENT)
Dept: URGENT CARE | Facility: CLINIC | Age: 30
End: 2020-12-11
Payer: COMMERCIAL

## 2020-12-11 VITALS
WEIGHT: 200 LBS | HEART RATE: 96 BPM | TEMPERATURE: 97.8 F | BODY MASS INDEX: 34.15 KG/M2 | HEIGHT: 64 IN | RESPIRATION RATE: 18 BRPM | OXYGEN SATURATION: 97 %

## 2020-12-11 DIAGNOSIS — Z20.822 EXPOSURE TO COVID-19 VIRUS: Primary | ICD-10-CM

## 2020-12-11 DIAGNOSIS — Z20.822 EXPOSURE TO COVID-19 VIRUS: ICD-10-CM

## 2020-12-11 PROCEDURE — G0382 LEV 3 HOSP TYPE B ED VISIT: HCPCS | Performed by: PHYSICIAN ASSISTANT

## 2020-12-11 PROCEDURE — U0003 INFECTIOUS AGENT DETECTION BY NUCLEIC ACID (DNA OR RNA); SEVERE ACUTE RESPIRATORY SYNDROME CORONAVIRUS 2 (SARS-COV-2) (CORONAVIRUS DISEASE [COVID-19]), AMPLIFIED PROBE TECHNIQUE, MAKING USE OF HIGH THROUGHPUT TECHNOLOGIES AS DESCRIBED BY CMS-2020-01-R: HCPCS | Performed by: PHYSICIAN ASSISTANT

## 2020-12-12 LAB — SARS-COV-2 RNA SPEC QL NAA+PROBE: NOT DETECTED

## 2020-12-17 ENCOUNTER — TELEMEDICINE (OUTPATIENT)
Dept: NEUROLOGY | Facility: CLINIC | Age: 30
End: 2020-12-17
Payer: COMMERCIAL

## 2020-12-17 ENCOUNTER — TELEPHONE (OUTPATIENT)
Dept: NEUROLOGY | Facility: CLINIC | Age: 30
End: 2020-12-17

## 2020-12-17 DIAGNOSIS — M51.36 LUMBAR DEGENERATIVE DISC DISEASE: ICD-10-CM

## 2020-12-17 DIAGNOSIS — G35 MS (MULTIPLE SCLEROSIS) (HCC): Primary | ICD-10-CM

## 2020-12-17 PROBLEM — M51.369 LUMBAR DEGENERATIVE DISC DISEASE: Status: ACTIVE | Noted: 2020-12-17

## 2020-12-17 PROCEDURE — 99214 OFFICE O/P EST MOD 30 MIN: CPT | Performed by: PSYCHIATRY & NEUROLOGY

## 2020-12-17 RX ORDER — ERGOCALCIFEROL 1.25 MG/1
50000 CAPSULE ORAL WEEKLY
Qty: 12 CAPSULE | Refills: 0 | Status: SHIPPED | OUTPATIENT
Start: 2020-12-17 | End: 2022-04-08 | Stop reason: ALTCHOICE

## 2020-12-17 NOTE — TELEPHONE ENCOUNTER
Please send the patient Aubagio booklet and a form, in addition to blood work script ( CBC/CMP/quantiferon)      Patient is planning switching glatiramer acetate to Aubagio only after she agrees with risk and side effects of the medication Yes

## 2020-12-18 NOTE — TELEPHONE ENCOUNTER
Will f/u with the patient after she receives below mailed materials  This should take about 7 days for the patient to receive

## 2020-12-30 NOTE — TELEPHONE ENCOUNTER
Spoke with patient, she did not yet receive Aubagio booklet and start form or lab scripts  Discussed that this may be related to current delay with [employer]  Pt agreeable to these being mailed again to ensure she receives them  Mailed

## 2021-01-06 NOTE — TELEPHONE ENCOUNTER
Called patient, states she still has not received this information as of yet  She asked to send the information again but to the following address: [address]  This is the address of a friend that she is living with  States she is currently living there but goes to her parents' house on the weekends (and that was where we sent the information before)  I again confirmed with patient that I will be sending her labs and that she is okay with this coming to friend's address  Patient denies having any further questions or concerns at this time  She will call the office with any updates but otherwise will follow up in about a week  Printed labs and start form, then I mailed Aubagio booklets with these to the address the patient provided

## 2021-01-18 NOTE — TELEPHONE ENCOUNTER
Pt left voicemail stating she received the information on Aubagio and would like to try this medication  Called and Left a message on pt's answering machine for a call back  Pt will need to have labs drawn (already ordered 12/17/20) and return signed start form to our office

## 2021-01-18 NOTE — TELEPHONE ENCOUNTER
Pt left voicemail asking for a call back  Spoke with pt, she reports she does have lab orders and will have this completed  She would like start form to be submitted for her now, she understands that she may not start the medication until results are received and she is cleared  She is unsure if she has a start form at home to sign and would like our office to submit this without her signature  She is aware [facility] will need consent to proceed with start process  There is typically a way to complete this online  Dr NEHA jonas to proceed with Aubagio start form while awaiting lab results? Would this be 7mg daily for 30 days, then 14mg daily? Or is the plan to stay at 7mg daily?

## 2021-01-18 NOTE — TELEPHONE ENCOUNTER
Yes, please proceed with a start form;  Aubagio 7 mg for 30 days, and then Aubagio 14 mg as maintenance should be considered, if tolerated

## 2021-01-20 NOTE — TELEPHONE ENCOUNTER
[facility] was [facility] to be sent in without her signature  She will provide consent directly to [facility]  Are we able to resubmit start form directly to [facility]?

## 2021-01-21 NOTE — TELEPHONE ENCOUNTER
Called [facility] at [phone] and spoke with [place]  She reports that start form was received, pt will need to submit consent at www [employer] com  States start form will also need to be corrected as 2 doses have been chosen  Reviewed start form, 7mg dose chosen for initial shipment and 14mg dose chosen for refills  Prescribing information with titration instructions must be included in "special instructions " One start option was also chosen, our office does not utilize this  Start form faxed again with added titration instructions  Also declined one start option  Called and Left a message on pt's answering machine for a call back  Need to make her aware to complete online consent as noted above

## 2021-01-21 NOTE — TELEPHONE ENCOUNTER
Pt made aware, she would like website sent to her in a [employer] message  [employer] message sent  Will f/u

## 2021-01-25 NOTE — TELEPHONE ENCOUNTER
Blank start form emailed to the patient so that she may sign the consent portion  [employer] message sent making her aware

## 2021-01-28 NOTE — TELEPHONE ENCOUNTER
Called [facility] at [phone] and spoke with PUMAΑROXANNEΑJORGE LUISΤΙ  She reports they received pt's consent today, start form is in process

## 2021-02-03 NOTE — TELEPHONE ENCOUNTER
Received email from Cynthia ( with [facility] one [employer] one):    [employer] advised that the patients policy termed and was only valid 01/01/2020- 01/01/2021  Do you have any other insurance listed for this patient? **If no other insurance available, patient will be referred to our patient assistance program**    No other insurance on file for the patient  Called pt, she reports she has new insurance as of 1/1/21:    [employer]  ID: [id number]  Group: [id number]  Phone #: [phone]    Pt reports this coverage is for her medical and prescription benefits  Did attempt to update insurance in [address] [place] [address] Rd  Email sent to Cynthia with this information

## 2021-02-08 NOTE — TELEPHONE ENCOUNTER
Received email from Cynthia:    "I have completed the benefits verification for patient Collin Harden, : 1990  PA is required and can be completed on line at [email or web address]  The specialty pharmacy that will be dispensing Aubagio will be [facility]  The patient has been approved for copay assistance    Please let me know if you have any questions "

## 2021-02-09 NOTE — TELEPHONE ENCOUNTER
PA submitted on CMM for Aubagio:    7mg strength: Key: [id number]    14mg strength: Key: [id number]    Awaiting determination

## 2021-02-10 NOTE — TELEPHONE ENCOUNTER
Aubagio 7mg approved from 2/9/21-2/9/22  Aubagio 14mg approved from 2/9/21-2/9/22  Email sent to [facility] with Kimani Wong one to one making her aware  Pt made aware of approval  She does not have [facility]'s phone # and cannot write this phone # down at this time  Would like phone # sent to her via Paulita Paget  [facility] [phone]    Message sent

## 2021-02-15 NOTE — TELEPHONE ENCOUNTER
Received the following email from Kimani Wong one to one  Daphne:    I spoke with the dispensing [address]  [employer]  regarding patient Mer Rojas, : 1990, and they advised they are waiting on the PA to ship the [address]  Could you please fax the PA over to the Specialty pharmacy at [phone]  You can also give them a call at [phone], should there be any issues  Approval letters faxed as requested

## 2021-02-17 NOTE — TELEPHONE ENCOUNTER
Pt reports she has not yet had a chance to schedule delivery of her Aubagio  Advised [facility]  Pt is agreeable and plans to call later today  She is aware to call back with any problems getting this scheduled

## 2021-02-24 NOTE — TELEPHONE ENCOUNTER
Pt reports Doriso is scheduled to be delivered this week  She will call if she does not receive her medication or with any issues

## 2021-02-25 RX ORDER — RENAGEL 800 MG/1
14 TABLET ORAL DAILY
Qty: 30 TABLET | Refills: 5 | Status: SHIPPED | OUTPATIENT
Start: 2021-02-25 | End: 2022-04-08

## 2021-02-25 RX ORDER — TERIFLUNOMIDE 7 MG/1
7 TABLET, FILM COATED ORAL DAILY
Qty: 30 TABLET | Refills: 0 | Status: SHIPPED | OUTPATIENT
Start: 2021-02-25 | End: 2021-04-05 | Stop reason: SDUPTHER

## 2021-02-25 NOTE — TELEPHONE ENCOUNTER
Pt left voicemail stating she has a high copay for Aubagio and states she will need copay assistance or another medication prescribed  States her insurance will only cover a 15 day supply as well  Called pt, she reports she was told her copay would be $600 and insurance will only allow a 15 day supply  Advised pt she should call [place] one [facility] one for copay assistance, she confirms she has this phone # already and already received a call from them today  Also advised that some insurance companies will only fill 15 day supplies for the first few months and once established on the medication will allow a larger quantity  Advised that she contact her insurance company for further details  Pt is agreeable  She is asking for new Aubagio script to be sent as [facility] is confused over 7mg and 14mg dosing schedule  Please review and sign, thanks!

## 2021-03-02 NOTE — TELEPHONE ENCOUNTER
Called and Left a message on pt's answering machine for a call back    Need to check if pt was able to get her Aubagio

## 2021-03-04 NOTE — TELEPHONE ENCOUNTER
Called and Left a message on pt's answering machine for a call back  2nd attempt  Letter mailed to the patient, asked her to contact our office with any problems getting Curtis Silva  Unable to reach pt

## 2021-04-05 DIAGNOSIS — G35 MS (MULTIPLE SCLEROSIS) (HCC): ICD-10-CM

## 2021-04-05 NOTE — TELEPHONE ENCOUNTER
Patient left voicemail  I returned her call  Patient started Aubagio 7mg appx 7 days ago  Patient states she is having symptoms that correlate with starting the medication  Patient complains of difficulty regulating her body temperature  She states that she will be very cold one minute and then very hot the next  Patient reports headaches every day  Patient has hx of migraines and takes Topamax 25mg-1 tab BID  Patient also reports low BPs of 116/48 and 118/55 over the weekend  States her normal is 120/80  Patient denies any new numbness/tingling or weakness  Denies any bowel/bladder changes, UTI symptoms or any recent illnesses  Patient reports she felt a pulsating sensation in her eyes the first two days of taking the medication  This has improved and now only occurs randomly  Patient has received IV steroids in the past  She tolerated them well  Patient used medical marijuana 1-2x/daily  DMT- Aubagio 7mg  Patient did not take her medication today due to the symptoms she was having  Last MRI-1/2019    [phone] to leave detailed msg  Dr Eleazar Bejarano - please advise  Thanks!

## 2021-04-05 NOTE — TELEPHONE ENCOUNTER
Patient will be advised to hold off Aubagio for 10 days, then re-start Aubagio 7 mg every other day for 7 days, then 7 mg daily and reach our office back in 3 weeks

## 2021-04-06 RX ORDER — TERIFLUNOMIDE 7 MG/1
TABLET, FILM COATED ORAL
Qty: 24 TABLET | Refills: 0 | Status: SHIPPED | OUTPATIENT
Start: 2021-04-06 | End: 2022-04-08 | Stop reason: ALTCHOICE

## 2021-05-11 ENCOUNTER — OFFICE VISIT (OUTPATIENT)
Dept: FAMILY MEDICINE CLINIC | Facility: CLINIC | Age: 31
End: 2021-05-11

## 2021-05-11 VITALS
DIASTOLIC BLOOD PRESSURE: 80 MMHG | SYSTOLIC BLOOD PRESSURE: 140 MMHG | HEART RATE: 62 BPM | WEIGHT: 213 LBS | HEIGHT: 64 IN | BODY MASS INDEX: 36.37 KG/M2 | TEMPERATURE: 98.8 F | OXYGEN SATURATION: 97 %

## 2021-05-11 DIAGNOSIS — J45.21 MILD INTERMITTENT ASTHMATIC BRONCHITIS WITH ACUTE EXACERBATION: ICD-10-CM

## 2021-05-11 DIAGNOSIS — M25.562 ACUTE PAIN OF LEFT KNEE: Primary | ICD-10-CM

## 2021-05-11 DIAGNOSIS — G35 MS (MULTIPLE SCLEROSIS) (HCC): ICD-10-CM

## 2021-05-11 PROCEDURE — 99214 OFFICE O/P EST MOD 30 MIN: CPT | Performed by: FAMILY MEDICINE

## 2021-05-11 NOTE — PROGRESS NOTES
Assessment/Plan:       Problem List Items Addressed This Visit        Respiratory    Asthmatic bronchitis with acute exacerbation       Nervous and Auditory    MS (multiple sclerosis) ([facility] [address] )     Patient is currently off medication due to side effects however due to this underlying medical condition it can complicate her ability to ambulate safely with her new injury to the left knee she will use a cane currently for proper ambulatory assistance and balance            Other    Acute pain of left knee - Primary     Pain in lateral joint line space left knee with mild swelling and pain on extension and flexion also weight bearing pain  This possibly is a meniscal tear laterally or just bad sprain injury and recommend initially an x-ray and then orthopedic evaluation possibly MRI depending on progress of her workup she will rest and ice the area and remain out of work pending current workup  She will continue to ice the area and continue with Tylenol or ibuprofen until further evaluation         Relevant Orders    XR knee 4+ vw left injury    Ambulatory referral to Orthopedic Surgery            Subjective:      Patient ID: Gita Jimenez is a 33 y o  female  Patient presents today after she injured her left knee at work  She jumped off the [employer] which she does frequently at her job as a   At the time of the injury she landed on her left leg initially with most of her weight on to that leg before coming down onto the right leg  Her knee locked and she felt a pop sound  She has since had pain with weight-bearing and walking and bending her knee and had difficulty sleeping last night as a result she does not note significant swelling in the area    She has done this movement multiple times in the past with her job but however this time she landed differently on her knee and felt the popping sound and now has significant pain and came in for further evaluation and treatment      The following portions of the patient's history were reviewed and updated as appropriate: allergies, current medications, past family history, past medical history, past social history, past surgical history and problem list     Review of Systems   Constitutional: Negative for chills, fatigue and fever  HENT: Negative for congestion, nosebleeds, rhinorrhea, sinus pressure and sore throat  Eyes: Negative for discharge and redness  Respiratory: Negative for cough and shortness of breath  Cardiovascular: Negative for chest pain, palpitations and leg swelling  Gastrointestinal: Negative for abdominal pain, blood in stool and nausea  Endocrine: Negative for cold intolerance, heat intolerance and polyuria  Genitourinary: Negative for dysuria and frequency  Musculoskeletal: Negative for arthralgias, back pain and myalgias  Skin: Negative for rash  Neurological: Negative for dizziness, weakness and headaches  Hematological: Negative for adenopathy  Psychiatric/Behavioral: Negative for behavioral problems and sleep disturbance  The patient is not nervous/anxious  Objective:      /80 (BP Location: Right arm, Patient Position: Sitting, Cuff Size: Large)   Pulse 62   Temp 98 8 °F (37 1 °C) (Tympanic)   Ht 5' 4" (1 626 m)   Wt 96 6 kg (213 lb)   SpO2 97%   BMI 36 56 kg/m²        Physical Exam  Vitals signs and nursing note reviewed  Constitutional:       General: She is not in acute distress  Appearance: Normal appearance  She is well-developed and normal weight  HENT:      Head: Normocephalic and atraumatic  Right Ear: Tympanic membrane, ear canal and external ear normal       Left Ear: Tympanic membrane, ear canal and external ear normal       Nose: Nose normal       Mouth/Throat:      Mouth: Mucous membranes are moist       Pharynx: Oropharynx is clear  No oropharyngeal exudate  Eyes:      General: No scleral icterus  Right eye: No discharge           Left eye: No discharge  Conjunctiva/sclera: Conjunctivae normal       Pupils: Pupils are equal, round, and reactive to light  Neck:      Musculoskeletal: Normal range of motion  Thyroid: No thyromegaly  Vascular: No JVD  Cardiovascular:      Rate and Rhythm: Normal rate and regular rhythm  Heart sounds: Normal heart sounds  No murmur  Pulmonary:      Effort: Pulmonary effort is normal       Breath sounds: No wheezing or rales  Chest:      Chest wall: No tenderness  Abdominal:      General: Bowel sounds are normal  There is no distension  Palpations: Abdomen is soft  There is no mass  Tenderness: There is no abdominal tenderness  Musculoskeletal: Normal range of motion  General: No tenderness or deformity  Comments: Lateral joint line pain with mild swelling below the patella medially and laterally  Negative Kirsty's or Lachman's testing she has difficulty with straight leg raising flexion greater than 90° and full extension pain and pain on weight-bearing   Lymphadenopathy:      Cervical: No cervical adenopathy  Skin:     General: Skin is warm and dry  Findings: No rash  Neurological:      General: No focal deficit present  Mental Status: She is alert and oriented to person, place, and time  Cranial Nerves: No cranial nerve deficit  Coordination: Coordination normal       Deep Tendon Reflexes: Reflexes are normal and symmetric  Reflexes normal    Psychiatric:         Mood and Affect: Mood normal          Behavior: Behavior normal          Thought Content: Thought content normal          Judgment: Judgment normal           Data:    Laboratory Results: I have personally reviewed the pertinent laboratory results/reports   Radiology/Other Diagnostic Testing Results: I have personally reviewed pertinent reports         Lab Results   Component Value Date    WBC 15 28 (H) 04/01/2014    HGB 12 1 04/01/2014    HCT 35 6 04/01/2014    MCV 90 04/01/2014    PLT 346 04/01/2014     Lab Results   Component Value Date     (L) 03/16/2014    K 3 5 03/16/2014     03/16/2014    CO2 25 03/16/2014    ANIONGAP 10 03/16/2014    BUN 11 03/16/2014    CREATININE 0 93 03/16/2014    GLUCOSE 104 03/16/2014    CALCIUM 10 0 03/16/2014     No results found for: CHOLESTEROL  No results found for: HDL  No results found for: LDLCALC  No results found for: TRIG  No results found for: CHOLHDL  No results found for: TGT9VFCMRIYC, TSH  Lab Results   Component Value Date    HGBA1C 5 7 (H) 10/11/2019     No results found for: PSA    [facility], DO

## 2021-05-11 NOTE — PROGRESS NOTES
BMI Counseling: There is no height or weight on file to calculate BMI  The BMI is above normal  Nutrition recommendations include reducing portion sizes, decreasing overall calorie intake, 3-5 servings of fruits/vegetables daily, reducing fast food intake, consuming healthier snacks, decreasing soda and/or juice intake, moderation in carbohydrate intake, increasing intake of lean protein, reducing intake of saturated fat and trans fat and reducing intake of cholesterol  Exercise recommendations include exercising 3-5 times per week

## 2021-05-11 NOTE — ASSESSMENT & PLAN NOTE
Patient is currently off medication due to side effects however due to this underlying medical condition it can complicate her ability to ambulate safely with her new injury to the left knee she will use a cane currently for proper ambulatory assistance and balance

## 2021-05-11 NOTE — ASSESSMENT & PLAN NOTE
Pain in lateral joint line space left knee with mild swelling and pain on extension and flexion also weight bearing pain  This possibly is a meniscal tear laterally or just bad sprain injury and recommend initially an x-ray and then orthopedic evaluation possibly MRI depending on progress of her workup she will rest and ice the area and remain out of work pending current workup    She will continue to ice the area and continue with Tylenol or ibuprofen until further evaluation

## 2021-05-13 ENCOUNTER — DOCUMENTATION (OUTPATIENT)
Dept: NEUROLOGY | Facility: CLINIC | Age: 31
End: 2021-05-13

## 2021-05-13 NOTE — PROGRESS NOTES
Received adverse event report for Aubagio regarding symptoms reported in 4/5/21 encounter  Form completed and faxed back to [facility]

## 2021-05-19 NOTE — PROGRESS NOTES
Received call from [facility] asking for fax to be sent again  Completed new form and faxed successfully

## 2021-07-22 DIAGNOSIS — G44.89 OTHER HEADACHE SYNDROME: ICD-10-CM

## 2021-07-23 RX ORDER — TOPIRAMATE 25 MG/1
TABLET ORAL
Qty: 180 TABLET | Refills: 0 | Status: SHIPPED | OUTPATIENT
Start: 2021-07-23 | End: 2021-11-01

## 2021-11-01 ENCOUNTER — TELEPHONE (OUTPATIENT)
Dept: NEUROLOGY | Facility: CLINIC | Age: 31
End: 2021-11-01

## 2021-11-01 DIAGNOSIS — G44.89 OTHER HEADACHE SYNDROME: ICD-10-CM

## 2021-11-01 DIAGNOSIS — G35 MS (MULTIPLE SCLEROSIS) (HCC): Primary | ICD-10-CM

## 2021-11-01 RX ORDER — TOPIRAMATE 25 MG/1
TABLET ORAL
Qty: 180 TABLET | Refills: 0 | Status: SHIPPED | OUTPATIENT
Start: 2021-11-01 | End: 2022-04-08 | Stop reason: SDUPTHER

## 2021-11-09 ENCOUNTER — OFFICE VISIT (OUTPATIENT)
Dept: FAMILY MEDICINE CLINIC | Facility: CLINIC | Age: 31
End: 2021-11-09
Payer: COMMERCIAL

## 2021-11-09 VITALS
TEMPERATURE: 98.5 F | OXYGEN SATURATION: 98 % | DIASTOLIC BLOOD PRESSURE: 62 MMHG | BODY MASS INDEX: 39.34 KG/M2 | HEART RATE: 75 BPM | HEIGHT: 64 IN | WEIGHT: 230.4 LBS | SYSTOLIC BLOOD PRESSURE: 128 MMHG | RESPIRATION RATE: 18 BRPM

## 2021-11-09 DIAGNOSIS — G89.29 CHRONIC BILATERAL LOW BACK PAIN WITHOUT SCIATICA: ICD-10-CM

## 2021-11-09 DIAGNOSIS — E03.9 HYPOTHYROIDISM, UNSPECIFIED TYPE: ICD-10-CM

## 2021-11-09 DIAGNOSIS — J45.21 MILD INTERMITTENT ASTHMATIC BRONCHITIS WITH ACUTE EXACERBATION: ICD-10-CM

## 2021-11-09 DIAGNOSIS — G35 MS (MULTIPLE SCLEROSIS) (HCC): Primary | ICD-10-CM

## 2021-11-09 DIAGNOSIS — K21.9 GASTROESOPHAGEAL REFLUX DISEASE WITHOUT ESOPHAGITIS: ICD-10-CM

## 2021-11-09 DIAGNOSIS — M54.50 CHRONIC BILATERAL LOW BACK PAIN WITHOUT SCIATICA: ICD-10-CM

## 2021-11-09 PROCEDURE — 3725F SCREEN DEPRESSION PERFORMED: CPT | Performed by: FAMILY MEDICINE

## 2021-11-09 PROCEDURE — 3008F BODY MASS INDEX DOCD: CPT | Performed by: FAMILY MEDICINE

## 2021-11-09 PROCEDURE — 99214 OFFICE O/P EST MOD 30 MIN: CPT | Performed by: FAMILY MEDICINE

## 2021-11-09 RX ORDER — FAMOTIDINE 40 MG/1
20 TABLET, FILM COATED ORAL DAILY
Qty: 30 TABLET | Refills: 5 | Status: SHIPPED | OUTPATIENT
Start: 2021-11-09

## 2021-11-09 RX ORDER — METFORMIN HYDROCHLORIDE 500 MG/1
1000 TABLET, EXTENDED RELEASE ORAL 2 TIMES DAILY WITH MEALS
Qty: 180 TABLET | Refills: 3 | Status: SHIPPED | OUTPATIENT
Start: 2021-11-09 | End: 2022-07-29 | Stop reason: SDUPTHER

## 2021-11-24 ENCOUNTER — HOSPITAL ENCOUNTER (OUTPATIENT)
Dept: MRI IMAGING | Facility: HOSPITAL | Age: 31
Discharge: HOME | End: 2021-11-24
Payer: COMMERCIAL

## 2021-11-24 DIAGNOSIS — G35 MS (MULTIPLE SCLEROSIS) (HCC): ICD-10-CM

## 2021-11-24 PROCEDURE — 72156 MRI NECK SPINE W/O & W/DYE: CPT

## 2021-11-24 PROCEDURE — A9585 GADOBUTROL INJECTION: HCPCS | Performed by: PHYSICIAN ASSISTANT

## 2021-11-24 PROCEDURE — G1004 CDSM NDSC: HCPCS

## 2021-11-24 PROCEDURE — 70553 MRI BRAIN STEM W/O & W/DYE: CPT

## 2021-11-24 RX ADMIN — GADOBUTROL 10 ML: 604.72 INJECTION INTRAVENOUS at 18:42

## 2021-12-08 ENCOUNTER — OFFICE VISIT (OUTPATIENT)
Dept: NEUROLOGY | Facility: CLINIC | Age: 31
End: 2021-12-08
Payer: COMMERCIAL

## 2021-12-08 VITALS
DIASTOLIC BLOOD PRESSURE: 74 MMHG | WEIGHT: 231.4 LBS | HEIGHT: 64 IN | BODY MASS INDEX: 39.5 KG/M2 | HEART RATE: 79 BPM | TEMPERATURE: 97.8 F | SYSTOLIC BLOOD PRESSURE: 129 MMHG

## 2021-12-08 DIAGNOSIS — G35 MS (MULTIPLE SCLEROSIS) (HCC): Primary | ICD-10-CM

## 2021-12-08 DIAGNOSIS — R20.0 NUMBNESS AND TINGLING IN BOTH HANDS: ICD-10-CM

## 2021-12-08 DIAGNOSIS — M51.36 LUMBAR DEGENERATIVE DISC DISEASE: ICD-10-CM

## 2021-12-08 DIAGNOSIS — R20.2 NUMBNESS AND TINGLING IN BOTH HANDS: ICD-10-CM

## 2021-12-08 DIAGNOSIS — E28.2 POLYCYSTIC OVARIAN SYNDROME: ICD-10-CM

## 2021-12-08 PROCEDURE — 99215 OFFICE O/P EST HI 40 MIN: CPT | Performed by: PSYCHIATRY & NEUROLOGY

## 2021-12-08 PROCEDURE — 3008F BODY MASS INDEX DOCD: CPT | Performed by: PSYCHIATRY & NEUROLOGY

## 2021-12-08 RX ORDER — OLANZAPINE 2.5 MG/1
2.5 TABLET ORAL DAILY
Qty: 5 TABLET | Refills: 5 | Status: SHIPPED | OUTPATIENT
Start: 2021-12-08

## 2021-12-08 RX ORDER — DEXAMETHASONE 2 MG/1
2 TABLET ORAL
Qty: 5 TABLET | Refills: 4 | Status: SHIPPED | OUTPATIENT
Start: 2021-12-08

## 2021-12-08 RX ORDER — GABAPENTIN 100 MG/1
100 CAPSULE ORAL 3 TIMES DAILY
Qty: 90 CAPSULE | Refills: 5 | Status: SHIPPED | OUTPATIENT
Start: 2021-12-08 | End: 2022-05-31

## 2021-12-08 RX ORDER — KETOROLAC TROMETHAMINE 10 MG/1
10 TABLET, FILM COATED ORAL EVERY 6 HOURS PRN
Qty: 10 TABLET | Refills: 3 | Status: SHIPPED | OUTPATIENT
Start: 2021-12-08 | End: 2022-02-03

## 2022-02-03 DIAGNOSIS — R20.2 NUMBNESS AND TINGLING IN BOTH HANDS: ICD-10-CM

## 2022-02-03 DIAGNOSIS — G35 MS (MULTIPLE SCLEROSIS) (HCC): ICD-10-CM

## 2022-02-03 DIAGNOSIS — R20.0 NUMBNESS AND TINGLING IN BOTH HANDS: ICD-10-CM

## 2022-02-03 RX ORDER — KETOROLAC TROMETHAMINE 10 MG/1
TABLET, FILM COATED ORAL
Qty: 10 TABLET | Refills: 0 | Status: SHIPPED | OUTPATIENT
Start: 2022-02-03 | End: 2022-04-08 | Stop reason: SDUPTHER

## 2022-04-04 ENCOUNTER — TELEPHONE (OUTPATIENT)
Dept: NEUROLOGY | Facility: CLINIC | Age: 32
End: 2022-04-04

## 2022-04-04 NOTE — TELEPHONE ENCOUNTER
Called Patient leave a message to confirm appointment for Friday April 8 with Pablo Greenberg  and ask PT if she had Lab work done that it was order in the last LOV- 12/08/2021 by Dr Gale Denney

## 2022-04-08 ENCOUNTER — OFFICE VISIT (OUTPATIENT)
Dept: NEUROLOGY | Facility: CLINIC | Age: 32
End: 2022-04-08
Payer: COMMERCIAL

## 2022-04-08 VITALS
OXYGEN SATURATION: 97 % | HEIGHT: 64 IN | HEART RATE: 83 BPM | TEMPERATURE: 97.1 F | DIASTOLIC BLOOD PRESSURE: 60 MMHG | SYSTOLIC BLOOD PRESSURE: 128 MMHG | WEIGHT: 239.4 LBS | BODY MASS INDEX: 40.87 KG/M2

## 2022-04-08 DIAGNOSIS — G43.709 CHRONIC MIGRAINE WITHOUT AURA WITHOUT STATUS MIGRAINOSUS, NOT INTRACTABLE: ICD-10-CM

## 2022-04-08 DIAGNOSIS — G35 MS (MULTIPLE SCLEROSIS) (HCC): Primary | ICD-10-CM

## 2022-04-08 DIAGNOSIS — R20.2 NUMBNESS AND TINGLING IN BOTH HANDS: ICD-10-CM

## 2022-04-08 DIAGNOSIS — R20.0 NUMBNESS AND TINGLING IN BOTH HANDS: ICD-10-CM

## 2022-04-08 PROCEDURE — 3008F BODY MASS INDEX DOCD: CPT

## 2022-04-08 PROCEDURE — 99214 OFFICE O/P EST MOD 30 MIN: CPT

## 2022-04-08 RX ORDER — FLUTICASONE PROPIONATE 50 MCG
SPRAY, SUSPENSION (ML) NASAL
COMMUNITY
Start: 2022-02-22

## 2022-04-08 RX ORDER — SUMATRIPTAN 50 MG/1
TABLET, FILM COATED ORAL
Qty: 9 TABLET | Refills: 3 | Status: SHIPPED | OUTPATIENT
Start: 2022-04-08

## 2022-04-08 RX ORDER — DEXTROMETHORPHAN HYDROBROMIDE AND PROMETHAZINE HYDROCHLORIDE 15; 6.25 MG/5ML; MG/5ML
5 SYRUP ORAL 4 TIMES DAILY PRN
COMMUNITY
Start: 2022-02-22 | End: 2022-08-05 | Stop reason: ALTCHOICE

## 2022-04-08 RX ORDER — KETOROLAC TROMETHAMINE 10 MG/1
TABLET, FILM COATED ORAL
Qty: 10 TABLET | Refills: 2 | Status: SHIPPED | OUTPATIENT
Start: 2022-04-08

## 2022-04-08 NOTE — ASSESSMENT & PLAN NOTE
Claudeen Allis is a 33year old female who presents to the office today to follow up on her MS and related concerns  She was maintained on Copaxone (site reactions) and then Aubagio (diffuse body pain) in the past  She has been off of Aubagio since April 2021 without known disease progression  At her last visit Tecfidera was discussed  She uses medical marijuana (smokes, vapes, edibles) and she finds this helpful  With gabapentin 100 mg daily, the burning sensory dysfunction is better in her hands and feet  She denies any weakness, imbalance, dizziness, bowel/bladder change, or vision change/eye pain  Her main complaint today is feeling on refreshed with sleep  She states she does not feel that she gets into a deep sleep and is awoken very easily several times throughout the night  She has had episodes of choking during sleep, she does not regularly snore, however she is overweight with a BMI of 41 09 and is at risk for sleep apnea  She denies ever being evaluated or treated for sleep apnea in the past       We had a thorough discussion regarding Tecfidera including usual dosing of 120 mg twice a day for 7 days then increasing to 240 mg twice a day thereafter  She understands the common side effects of Tecfidera are nausea, GI upset, diarrhea, and flushing  I have requested that she obtain a CBC and CMP as a baseline before starting Tecfidera and understands that she will need to be monitored routinely every 6-12 months thereafter  She is agreeable to pursuing this and has signed the start form today in office  I have asked her to increase her gabapentin to 200 mg at bedtime  This was previously prescribed as 100 mg 3 times a day however she finds it difficult to take doses during the day while at work  She will to try to take 200 mg at bedtime instead  In terms of her unrefreshing sleep, we did discuss that she is at risk for sleep apnea due to her morbid obesity    I did place a order for a home sleep study with the understanding that if this is abnormal she will pursu consultation with a sleep specialist for treatment

## 2022-04-08 NOTE — ASSESSMENT & PLAN NOTE
She continues to have daily headaches with various durations and if she doesn't treat them right away they turn into severe migraines, on average 3 severe migraines a month  She describes photophobia, phonophobia, nausea, vomiting, and needing to lie down in a dark room  Recommendations today include:   - Increase Topamax to 50 mg in the morning (take two 25 mg tabs until you finish your supply, then call for a new script)  - Increase Gabapentin to 200 mg at bedtime (you can try to take it in the morning but you may be tired)  - At the onset of headache use Tylenol or Ibuprofen, if no better within 30 minutes then do the below:    1  Use sumatriptan 50 mg at the onset of a migraine with Ketorolac, if no better in 2 hours take a 2nd dose of sumatriptan  2  If this fails take Olanzapine 2 5 mg at bedtime  3  If Migraine continues for more than 24 hours use Decadron (Dexmethasone) in the morning for 5 days to break cycle  - Do not use Ketorolac, Tylenol, Ibuprofen, or Sumatriptan more than 3 times per week  - Complete sleep study to evaluate for sleep apnea, this may be contributing to your daily headaches    - Follow up in 4 months

## 2022-04-08 NOTE — PATIENT INSTRUCTIONS
- Increase Topamax to 50 mg in the morning (take two 25 mg tabs until you finish your supply, then call for a new script)  - Increase Gabapentin to 200 mg at bedtime (you can try to take it in the morning but you may be tired)  - At the onset of headache use Tylenol or Ibuprofen, if no better within 30 minutes then do the below:  1  Use sumatriptan 50 mg at the onset of a migraine with Ketorolac, if no better in 2 hours take a 2nd dose of sumatriptan  2  If this fails take Olanzapine 2 5 mg at bedtime  3  If Migraine continues for more than 24 hours use Decadron (Dexmethasone) in the morning for 5 days to break cycle  - Do not use Ketorolac, Tylenol, Ibuprofen, or Sumatriptan more than 3 times per week    - Get lab work now; will repeat in 6 months once on Tecfidera (start form signed today)  - Complete sleep study  - Follow up in 4 months

## 2022-04-08 NOTE — PROGRESS NOTES
Patient ID: Terry Burns is a 33 y o  female  Assessment/Plan:    MS [facility]multiple sclerosis) ([facility] )  Terry Burns is a 33year old female who presents to the office today to follow up on her MS and related concerns  She was maintained on Copaxone (site reactions) and then Aubagio (diffuse body pain) in the past  She has been off of Aubagio since April 2021 without known disease progression  At her last visit Tecfidera was discussed  She uses medical marijuana (smokes, vapes, edibles) and she finds this helpful  With gabapentin 100 mg daily, the burning sensory dysfunction is better in her hands and feet  She denies any weakness, imbalance, dizziness, bowel/bladder change, or vision change/eye pain  Her main complaint today is feeling on refreshed with sleep  She states she does not feel that she gets into a deep sleep and is awoken very easily several times throughout the night  She has had episodes of choking during sleep, she does not regularly snore, however she is overweight with a BMI of 41 09 and is at risk for sleep apnea  She denies ever being evaluated or treated for sleep apnea in the past       We had a thorough discussion regarding Tecfidera including usual dosing of 120 mg twice a day for 7 days then increasing to 240 mg twice a day thereafter  She understands the common side effects of Tecfidera are nausea, GI upset, diarrhea, and flushing  I have requested that she obtain a CBC and CMP as a baseline before starting Tecfidera and understands that she will need to be monitored routinely every 6-12 months thereafter  She is agreeable to pursuing this and has signed the start form today in office  I have asked her to increase her gabapentin to 200 mg at bedtime  This was previously prescribed as 100 mg 3 times a day however she finds it difficult to take doses during the day while at work  She will to try to take 200 mg at bedtime instead    In terms of her unrefreshing sleep, we did discuss that she is at risk for sleep apnea due to her morbid obesity  I did place a order for a home sleep study with the understanding that if this is abnormal she will pursu consultation with a sleep specialist for treatment  Chronic migraine without aura without status migrainosus, not intractable  She continues to have daily headaches with various durations and if she doesn't treat them right away they turn into severe migraines, on average 3 severe migraines a month  She describes photophobia, phonophobia, nausea, vomiting, and needing to lie down in a dark room  Recommendations today include:   - Increase Topamax to 50 mg in the morning (take two 25 mg tabs until you finish your supply, then call for a new script)  - Increase Gabapentin to 200 mg at bedtime (you can try to take it in the morning but you may be tired)  - At the onset of headache use Tylenol or Ibuprofen, if no better within 30 minutes then do the below:    1  Use sumatriptan 50 mg at the onset of a migraine with Ketorolac, if no better in 2 hours take a 2nd dose of sumatriptan  2  If this fails take Olanzapine 2 5 mg at bedtime  3  If Migraine continues for more than 24 hours use Decadron (Dexmethasone) in the morning for 5 days to break cycle  - Do not use Ketorolac, Tylenol, Ibuprofen, or Sumatriptan more than 3 times per week  - Complete sleep study to evaluate for sleep apnea, this may be contributing to your daily headaches  - Follow up in 4 months         Diagnoses and all orders for this visit:    MS (multiple sclerosis) ([facility] )  -     Home Study; Future  -     CBC and differential; Future  -     Comprehensive metabolic panel; Future  -     ketorolac (TORADOL) 10 mg tablet; Take 1 tablet at the onset of a migraine headache with sumatriptan    Chronic migraine without aura without status migrainosus, not intractable  -     Home Study; Future  -     SUMAtriptan (Imitrex) 50 mg tablet;  Take 1 tab at the onset of a migraine with ketorolac, you may take another dose of sumatriptan in 2 hours if needed        Subjective:    HPI Cyril Stephens is a 33year old female who presents to the office today to follow up on her MS and related concerns  She was maintained on Copaxone (site reactions) and then Aubagio (diffuse body pain) in the past  She has been off of Aubagio since April 2021 without known disease progression  At her last visit Tecfidera was discussed  She uses medical marijuana (smokes, vapes, edibles) and she finds this helpful  With gabapentin 100 mg daily, the burning sensory dysfunction is better in her hands and feet  She denies any weakness, imbalance, dizziness, bowel/bladder change, or vision change/eye pain  Her main complaint today is feeling on refreshed with sleep  She states she does not feel that she gets into a deep sleep and is awoken very easily several times throughout the night  She has had episodes of choking during sleep, she does not regularly snore, however she is overweight with a BMI of 41 09 and is at risk for sleep apnea  She denies ever being evaluated or treated for sleep apnea in the past     She is also taking Topamax 25 mg once daily for headache prevention and Ketorolac 10 mg as needed as an abortive  She was also prescribed olanzapine 2 5 mg and Decadron 2 mg however, she states she was not sure how to use these  She continues to have daily headaches with various durations and if she doesn't treat them right away they turn into severe migraines, on average 3 severe migraines a month  She describes photophobia, phonophobia, nausea, vomiting, and needing to lie down in a dark room  MRI Brain 11/24/21  Unchanged multiple white matter lesions in a distribution compatible with multiple sclerosis  No new or enhancing lesions  MRI Cervical Spine 11/24/21  No evidence of demyelinating lesion in cervical spine  No abnormal enhancement  MRI Lumbar Spine 9/13/19  No significant degenerative changes    No canal or significant foraminal stenosis at any levels  The following portions of the patient's history were reviewed and updated as appropriate: allergies, current medications, past family history, past medical history, past social history and past surgical history  Objective:    Blood pressure 128/60, pulse 83, temperature (!) 97 1 °F (36 2 °C), temperature source Temporal, height 5' 4" (1 626 m), weight 109 kg (239 lb 6 4 oz), SpO2 97 %  Neurological Exam    On neurological examination patient is alert, awake, oriented and in no distress  Speech is fluent without dysarthria or aphasia  Cranial nerves 2-12 were symmetrically intact bilaterally  Visual acuity was 20/20 on hand-held eye chart  No temporal artery tenderness  No evidence of any focal weakness or sensory loss in the upper or lower extremities  Motor testing reveals 5/5 strength of the bilateral upper and lower extremities  There was no pronator drift  No fasciculations present  No abnormal involuntary movements  Finger- to-nose reveals no tremor or ataxia and intact proprioceptive function, no dysmetria was noted  Sensation was intact to vibration, light touch, and temperature in bilateral upper and lower extremities  Deep tendon reflexes were 2+ and symmetric in the bilateral upper and lower extremities  She is able to rise easily without assistance from a seated position  Casual gait is normal including stance, stride, and arm swing  Normal tandem gait  Romberg is absent  ROS:    Review of Systems   Constitutional: Negative  Negative for appetite change and fever  HENT: Negative  Negative for hearing loss, tinnitus, trouble swallowing and voice change  Eyes: Negative  Negative for photophobia and pain  Respiratory: Negative  Negative for shortness of breath  Cardiovascular: Negative  Negative for palpitations  Gastrointestinal: Negative  Negative for nausea and vomiting  Endocrine: Negative  Negative for cold intolerance  Genitourinary: Negative  Negative for dysuria, frequency and urgency  Musculoskeletal: Positive for back pain and neck stiffness  Negative for myalgias and neck pain  Skin: Negative  Negative for rash  Neurological: Positive for headaches  Negative for dizziness, tremors, seizures, syncope, facial asymmetry, speech difficulty, weakness, light-headedness and numbness  Patient stated that she has headaches everyday  Hematological: Negative  Does not bruise/bleed easily  Psychiatric/Behavioral: Positive for sleep disturbance  Negative for confusion and hallucinations  Reviewed ROS as entered by medical assistant

## 2022-04-18 ENCOUNTER — TELEPHONE (OUTPATIENT)
Dept: SLEEP CENTER | Facility: CLINIC | Age: 32
End: 2022-04-18

## 2022-04-18 NOTE — TELEPHONE ENCOUNTER
----- Message from Jerrold Paget, MD sent at 4/15/2022 12:19 PM EDT -----  Approved  ----- Message -----  From: Analisa Magallon  Sent: 0/59/1581   9:05 AM EDT  To: Sleep Medicine [facility] Provider    This sleep study needs approval      If approved please sign and return to clerical pool  If denied please include reasons why  Also provide alternative testing if warranted  Please sign and return to clerical pool

## 2022-04-19 ENCOUNTER — TELEPHONE (OUTPATIENT)
Dept: NEUROLOGY | Facility: CLINIC | Age: 32
End: 2022-04-19

## 2022-04-19 DIAGNOSIS — G35 MS (MULTIPLE SCLEROSIS) (HCC): Primary | ICD-10-CM

## 2022-04-19 NOTE — TELEPHONE ENCOUNTER
Received call from [facility] with HUMBERTO gimenez  He reports PA is needed for Tecfidera  CMM Key: [id number]  He then reports this will likely be denied as it is non formulary  Generic dimethyl fumarate is preferred  CBC and CMP completed 4/8/22  Yissel- please advise if generic dimethyl fumarate is appropriate for pt  Thanks!

## 2022-04-21 RX ORDER — DIMETHYL FUMARATE 240 MG/1
240 CAPSULE ORAL 2 TIMES DAILY
Qty: 60 CAPSULE | Refills: 11 | Status: SHIPPED | OUTPATIENT
Start: 2022-04-21 | End: 2022-04-28 | Stop reason: SDUPTHER

## 2022-04-21 NOTE — TELEPHONE ENCOUNTER
Pt has [facility]- needs to use [facility] specialty pharmacy on file  Scripts pended for dimethyl fumarate  Please review and sign if agreeable, thanks!

## 2022-04-26 NOTE — TELEPHONE ENCOUNTER
Received fax- dimethyl fumarate needs PA   Submitted on CMM:    Key: [id number] starter  Key: [id number] maintenance

## 2022-04-28 RX ORDER — DIMETHYL FUMARATE 240 MG/1
240 CAPSULE ORAL 2 TIMES DAILY
Qty: 60 CAPSULE | Refills: 11 | Status: SHIPPED | OUTPATIENT
Start: 2022-04-28 | End: 2022-08-05 | Stop reason: ALTCHOICE

## 2022-04-28 NOTE — TELEPHONE ENCOUNTER
maylin from [facility] called regarding tecfidera  she states that they are the servicing specialty pharm for pt's insurance  they are requesting generic as brand is not covered by insurance  i made her aware that we sent script for generic to [facility] specialty pharm  she states that they  recevied the request from [facility] [address]      Scripts entered, please sign off

## 2022-04-29 NOTE — TELEPHONE ENCOUNTER
Dimethyl fumarate is approved from 4/26/22 to 4/26/23  Per CMM, starter pack case was merged with maintenance dose case  New scripts already sent to [facility]

## 2022-05-09 NOTE — TELEPHONE ENCOUNTER
Left detailed message for pt (okay per communication consent) asking if she received her medication yet  Asked pt to call our office back with any issues getting the dimethyl fumarate

## 2022-05-10 NOTE — TELEPHONE ENCOUNTER
[address]  Spoke angelika/Delma  She states they do not have shipping authorization on file for patient  They have called patient x2 to obtain authorization  5/6/22 - left vm for patient  5/9/22 - left vm for patient    Please have patient call [phone] to set up authorization  Spoke w/patient  Made her aware of above  Patient verbalized understanding

## 2022-05-12 ENCOUNTER — TELEPHONE (OUTPATIENT)
Dept: NEUROLOGY | Facility: CLINIC | Age: 32
End: 2022-05-12

## 2022-05-12 NOTE — TELEPHONE ENCOUNTER
Shaina Canas from [facility] on nursing line to inform that Dimethyl Fumarate starter pack requires PA  If questions please call [phone]  Leonardo: [id number]    PA submitted via M  Awaiting determination

## 2022-05-16 NOTE — TELEPHONE ENCOUNTER
PA approved  Effective 5/13/2022 through 6/13/2022    Left detailed message for pt of same (ok per consent on file)  Advised pt to call [facility] at [phone] to follow up with them regarding starter pack  Advise to call our office if further questions

## 2022-05-31 DIAGNOSIS — G35 MS (MULTIPLE SCLEROSIS) (HCC): ICD-10-CM

## 2022-05-31 DIAGNOSIS — R20.2 NUMBNESS AND TINGLING IN BOTH HANDS: ICD-10-CM

## 2022-05-31 DIAGNOSIS — R20.0 NUMBNESS AND TINGLING IN BOTH HANDS: ICD-10-CM

## 2022-05-31 RX ORDER — GABAPENTIN 100 MG/1
CAPSULE ORAL
Qty: 90 CAPSULE | Refills: 0 | Status: SHIPPED | OUTPATIENT
Start: 2022-05-31

## 2022-06-02 ENCOUNTER — HOSPITAL ENCOUNTER (OUTPATIENT)
Dept: SLEEP CENTER | Facility: CLINIC | Age: 32
Discharge: HOME | End: 2022-06-02
Payer: COMMERCIAL

## 2022-06-02 DIAGNOSIS — G35 MS (MULTIPLE SCLEROSIS) (HCC): ICD-10-CM

## 2022-06-02 DIAGNOSIS — G43.709 CHRONIC MIGRAINE WITHOUT AURA WITHOUT STATUS MIGRAINOSUS, NOT INTRACTABLE: ICD-10-CM

## 2022-06-02 PROCEDURE — 95806 SLEEP STUDY UNATT&RESP EFFT: CPT | Performed by: INTERNAL MEDICINE

## 2022-06-02 PROCEDURE — G0399 HOME SLEEP TEST/TYPE 3 PORTA: HCPCS

## 2022-06-06 NOTE — PROGRESS NOTES
Home Sleep Study Documentation    HOME STUDY DEVICE: Noxturnal yes                                           Eli G3 no      Pre-Sleep Home Study:    Set-up and instructions performed by: Mya Smith performed demonstration for Patient: yes    Return demonstration performed by Patient: yes    Written instructions provided to Patient: yes    Patient signed consent form: yes        Post-Sleep Home Study:    Additional comments by Patient: none    Home Sleep Study Failed:no:    Failure reason: N/A    Reported or Detected: N/A    Scored by: MIC Cortez, MAXIMEGT

## 2022-06-07 ENCOUNTER — PATIENT MESSAGE (OUTPATIENT)
Dept: NEUROLOGY | Facility: CLINIC | Age: 32
End: 2022-06-07

## 2022-06-08 ENCOUNTER — PATIENT MESSAGE (OUTPATIENT)
Dept: NEUROLOGY | Facility: CLINIC | Age: 32
End: 2022-06-08

## 2022-06-08 DIAGNOSIS — G47.33 OSA (OBSTRUCTIVE SLEEP APNEA): Primary | ICD-10-CM

## 2022-06-10 ENCOUNTER — TELEPHONE (OUTPATIENT)
Dept: SLEEP CENTER | Facility: CLINIC | Age: 32
End: 2022-06-10

## 2022-06-10 NOTE — TELEPHONE ENCOUNTER
Left message for the patient to call back for sleep study results      Very mild ARINA with hypoxia Dr Cecily Gottlieb recommending in lag diagnostic

## 2022-06-22 ENCOUNTER — TELEPHONE (OUTPATIENT)
Dept: SLEEP CENTER | Facility: CLINIC | Age: 32
End: 2022-06-22

## 2022-06-22 NOTE — TELEPHONE ENCOUNTER
----- Message from Ara Gallardo MD sent at 6/21/2022  4:27 PM EDT -----  Approved    ----- Message -----  From: Sheila Salazar  Sent: 6/21/2022   9:52 AM EDT  To: Sleep Medicine [facility] Provider    This diagnostic sleep study needs approval      If approved please sign and return to clerical pool  If denied please include reasons why  Also provide alternative testing if warranted  Please sign and return to clerical pool

## 2022-07-22 DIAGNOSIS — E03.9 HYPOTHYROIDISM, UNSPECIFIED TYPE: Primary | ICD-10-CM

## 2022-07-22 RX ORDER — LEVOTHYROXINE SODIUM 0.05 MG/1
50 TABLET ORAL DAILY
Qty: 14 TABLET | Refills: 0 | Status: SHIPPED | OUTPATIENT
Start: 2022-07-22 | End: 2022-08-10 | Stop reason: SDUPTHER

## 2022-07-25 ENCOUNTER — TELEPHONE (OUTPATIENT)
Dept: NEUROLOGY | Facility: CLINIC | Age: 32
End: 2022-07-25

## 2022-07-25 NOTE — TELEPHONE ENCOUNTER
Left message to confirm appoinment for 7/28/22 at 8am with Neurology [facility]

## 2022-07-28 ENCOUNTER — OFFICE VISIT (OUTPATIENT)
Dept: NEUROLOGY | Facility: CLINIC | Age: 32
End: 2022-07-28
Payer: COMMERCIAL

## 2022-07-28 ENCOUNTER — TELEPHONE (OUTPATIENT)
Dept: NEUROLOGY | Facility: CLINIC | Age: 32
End: 2022-07-28

## 2022-07-28 VITALS
OXYGEN SATURATION: 98 % | WEIGHT: 230 LBS | HEART RATE: 98 BPM | SYSTOLIC BLOOD PRESSURE: 130 MMHG | BODY MASS INDEX: 39.48 KG/M2 | DIASTOLIC BLOOD PRESSURE: 82 MMHG

## 2022-07-28 DIAGNOSIS — G43.709 CHRONIC MIGRAINE WITHOUT AURA WITHOUT STATUS MIGRAINOSUS, NOT INTRACTABLE: ICD-10-CM

## 2022-07-28 DIAGNOSIS — G47.33 OSA (OBSTRUCTIVE SLEEP APNEA): ICD-10-CM

## 2022-07-28 DIAGNOSIS — G35 MS (MULTIPLE SCLEROSIS) (HCC): Primary | ICD-10-CM

## 2022-07-28 DIAGNOSIS — E28.2 POLYCYSTIC OVARIAN SYNDROME: ICD-10-CM

## 2022-07-28 DIAGNOSIS — G35 MS (MULTIPLE SCLEROSIS) (HCC): ICD-10-CM

## 2022-07-28 PROCEDURE — 99214 OFFICE O/P EST MOD 30 MIN: CPT

## 2022-07-28 NOTE — ASSESSMENT & PLAN NOTE
Blane De Santiago is a 27year old female who presents to the office today to follow up on her MS and related concerns  At her last visit Tecfidera was prescribed however she tells me the copay was $100 per month which was unafforable to her especially with recent inflaation, so she was unable to start it  We discussed, especially with commercial insurance there are lots of options for financial assistance  I have asked that our social workers please assist with obtaining Tecfidera for her  She understands that she is at risk for relapse without being on DMT  Thankfully, she denies any new neurologic symptoms or weakness  Although due to the increase in her recent stress she does endorse more muscle pain and tension  She also has not been consistently taking her gabapentin        Plan:  - Our social workers will be in contact about financial assistance for Tecfidera   - Continue Gabapentin 200 mg at bedtime consistently (set an alarm on your phone)  - Continue Topiramate 50 mg daily  - Follow up with Gynecology    - Follow up on sleep study in November  - Follow up in 4 months

## 2022-07-28 NOTE — ASSESSMENT & PLAN NOTE
With the recent increase in stress she states she has also had menstrual irregularities with menstration for the last 3 weeks straight despite her oral contraception  She has been unable to reach her GYN  She does have a hx of PCOS as well  Plan:  Encouraged her to continue to reach out to her GYN for evaluation and treatment  Offered referral to [address], however she declines today

## 2022-07-28 NOTE — TELEPHONE ENCOUNTER
----- Message from Frank Davies sent at 7/28/2022  9:08 AM EDT -----  Regarding: [address]  Kush De León was prescribed Tecfidera but her copay is $100 per month which she states she cannot afford  Can you assist her in getting set up for copay assistance or some other kind of financial assistance so that she can obtain and start tecfidera ASAP  Thank you!   Adrienne

## 2022-07-28 NOTE — ASSESSMENT & PLAN NOTE
At her last visit her Topamax was increased to 50 mg once daily for headache prevention and Ketorolac 10 mg and Sumatriptan as needed as an abortive  She also has olanzapine 2 5 mg and Decadron 2 mg as back up abortives  Since increasing Topamax she has felt her headaches have been better and less frequent  However, with her recent increase in stress, she has been experiencing them more frequently again just not as severe  Plan:  - At the onset of headache use Tylenol or Ibuprofen, if no better within 30 minutes then do the below:  1  Use sumatriptan 50 mg at the onset of a migraine with Ketorolac, if no better in 2 hours take a 2nd dose of sumatriptan  2  If this fails take Olanzapine 2 5 mg at bedtime  3  If Migraine continues for more than 24 hours use Decadron (Dexmethasone) in the morning for 5 days to break cycle  - Do not use Ketorolac, Tylenol, Ibuprofen, or Sumatriptan more than 3 times per week    - Continue Gabapentin 200 mg at bedtime consistently (set an alarm on your phone)  - Continue Topiramate 50 mg daily  - Follow up with Gynecology    - Follow up on sleep study in November  - Follow up in 4 months

## 2022-07-28 NOTE — ASSESSMENT & PLAN NOTE
She did complete a home study which was concerning for very mild ARINA but with significant episodes of hypoxia  As such she is now scheduled for an in lab diagnostic sleep study in November to look into this further

## 2022-07-28 NOTE — PROGRESS NOTES
Patient ID: Georgette Coppola is a 27 y o  female  Assessment/Plan:    MS (multiple sclerosis) ([facility] )  Georgette Coppola is a 27year old female who presents to the office today to follow up on her MS and related concerns  At her last visit Tecfidera was prescribed however she tells me the copay was $100 per month which was unafforable to her especially with recent inflaation, so she was unable to start it  We discussed, especially with commercial insurance there are lots of options for financial assistance  I have asked that our social workers please assist with obtaining Tecfidera for her  She understands that she is at risk for relapse without being on DMT  Thankfully, she denies any new neurologic symptoms or weakness  Although due to the increase in her recent stress she does endorse more muscle pain and tension  She also has not been consistently taking her gabapentin  Plan:  - Our social workers will be in contact about financial assistance for Tecfidera   - Continue Gabapentin 200 mg at bedtime consistently (set an alarm on your phone)  - Continue Topiramate 50 mg daily  - Follow up with Gynecology    - Follow up on sleep study in November  - Follow up in 4 months     Chronic migraine without aura without status migrainosus, not intractable  At her last visit her Topamax was increased to 50 mg once daily for headache prevention and Ketorolac 10 mg and Sumatriptan as needed as an abortive  She also has olanzapine 2 5 mg and Decadron 2 mg as back up abortives  Since increasing Topamax she has felt her headaches have been better and less frequent  However, with her recent increase in stress, she has been experiencing them more frequently again just not as severe  Plan:  - At the onset of headache use Tylenol or Ibuprofen, if no better within 30 minutes then do the below:  1  Use sumatriptan 50 mg at the onset of a migraine with Ketorolac, if no better in 2 hours take a 2nd dose of sumatriptan    2  If this fails take Olanzapine 2 5 mg at bedtime  3  If Migraine continues for more than 24 hours use Decadron (Dexmethasone) in the morning for 5 days to break cycle  - Do not use Ketorolac, Tylenol, Ibuprofen, or Sumatriptan more than 3 times per week  - Continue Gabapentin 200 mg at bedtime consistently (set an alarm on your phone)  - Continue Topiramate 50 mg daily  - Follow up with Gynecology    - Follow up on sleep study in November  - Follow up in 4 months     ARINA (obstructive sleep apnea)  She did complete a home study which was concerning for very mild ARINA but with significant episodes of hypoxia  As such she is now scheduled for an in lab diagnostic sleep study in November to look into this further  Polycystic ovarian syndrome  With the recent increase in stress she states she has also had menstrual irregularities with menstration for the last 3 weeks straight despite her oral contraception  She has been unable to reach her GYN  She does have a hx of PCOS as well  Plan:  Encouraged her to continue to reach out to her GYN for evaluation and treatment  Offered referral to [facility], however she declines today  Diagnoses and all orders for this visit:    MS (multiple sclerosis) ([facility] )    Chronic migraine without aura without status migrainosus, not intractable    ARINA (obstructive sleep apnea)    Polycystic ovarian syndrome           Subjective:    LUZ Jason Fuentes is a 27year old female who presents to the office today to follow up on her MS and related concerns  Multiple Sclerosis  She was maintained on Copaxone (site reactions) and then Aubagio (diffuse body pain) in the past  She has been off of Aubagio since April 2021 without known disease progression  At her last visit Tecfidera was prescribed however she tells me the copay was $100 per month which was unafforable to her especially with recent inflaation, so she was unable to start it   She recently gave up medical marijuana (smokes, vapes, edibles) as she is pursing a CDL certification  Muscle Pain/Spasms  Her gabapentin was increased to 200 mg at bedtime, however she admits to not consistently taking this lately as she forgets  She has also been under a lot more stress recently with preparing to move, changes at work, and financial difficulties  Due to this she complains of increased muscle pain and tension  She denies any weakness, imbalance, dizziness, bowel/bladder change, or vision change/eye pain  Sleep disturbance   She did complete a home study which was concerning for very mild ARINA but with significant episodes of hypoxia  As such she is now scheduled for an in lab diagnostic sleep study in November to look into this further  Migraine headaches   At her last visit her Topamax was increased to 50 mg once daily for headache prevention and Ketorolac 10 mg and Sumatriptan as needed as an abortive  She also has olanzapine 2 5 mg and Decadron 2 mg as back up abortives  Since increasing Topamax she has felt her headaches have been better and less frequent  However, with her recent increase in stress, she has been experiencing them more frequently again just not as severe  Other  With the recent increase in stress she states she has also had menstrual irregularities with menstration for the last 3 weeks straight despite her oral contraception  She has been unable to reach her GYN  She does have a hx of PCOS as well      Review of most recent imaging/testing on file:    MRI Brain 11/24/21  Unchanged multiple white matter lesions in a distribution compatible with multiple sclerosis  No new or enhancing lesions      MRI Cervical Spine 11/24/21  No evidence of demyelinating lesion in cervical spine   No abnormal enhancement      MRI Lumbar Spine 9/13/19  No significant degenerative changes   No canal or significant foraminal stenosis at any levels      CBC/CMP 4/8/22   Ref Range & Units 4/8/22 10:19 AM     Hemoglobin 11 5 - 14 5 g/dL 13 3    Hematocrit 35 0 - 43 0 % 38 9    WBC 4 0 - 10 0 thou/cmm 4 8    RBC 3 70 - 4 70 mill/cmm 4 57    Platelet Count 048 - 350 thou/cmm 250    MPV 7 5 - 11 3 fL 8 0    MCV 80 - 100 fL 85    MCH 26 0 - 34 0 pg 29 0    MCHC 32 0 - 37 0 g/dL 34 2    RDW 12 0 - 16 0 % 13 0    Differential Type  AUTO    Absolute Neutrophils 1 8 - 7 8 thou/cmm 3 1    Absolute Lymphocytes 1 0 - 3 0 thou/cmm 0 9 Low     Absolute Monocytes 0 3 - 1 0 thou/cmm 0 7    Absolute Eosinophils 0 0 - 0 5 thou/cmm 0 1    Absolute Basophils 0 0 - 0 1 thou/cmm 0 0    Neutrophils % 64    Lymphocytes % 18    Monocytes % 15    Eosinophils % 2    Basophils % 1       Ref Range & Units 4/8/22 10:19 AM   Glucose 65 - 99 mg/dL 104 High     BUN 7 - 25 mg/dL 12    Creatinine 0 40 - 1 10 mg/dL 0 96    Sodium 135 - 145 mmol/L 141    Potassium 3 5 - 5 2 mmol/L 4 1    Chloride 100 - 109 mmol/L 109    Carbon Dioxide 23 - 31 mmol/L 24    Calcium 8 5 - 10 1 mg/dL 9 6    Alkaline Phosphatase 35 - 120 U/L 81    Albumin 3 5 - 4 8 g/dL 3 5    Bilirubin, Total 0 2 - 1 0 mg/dL 0 2    Comment: Use of this assay is not recommended for patients undergoing treatment with eltrombopag due to the potential for falsely elevated results  Protein, Total 6 3 - 8 3 g/dL 7 2    AST <41 U/L 25    ALT <56 U/L 26    Anion Gap 3 - 11 8    eGFRcr >59 80    eGFRcr Comment  Interpretive information: calculated GFR    Comment:                                            Units: mL/min per 1 73 square meters   Reported eGFR is based on the CKD-EPI 2021 creatinine equation that does not use a race coefficient and conforms to the NKF-ASN Task Force Recommendations     Note: Calculated GFR may not be an accurate indicator of renal function if the patient's renal function is not in a steady state                                               GFR Categories in Chronic Kidney Disease (CKD):   GFR        GFR (mL/min/1 73   Category:  square meters):  Interpretation:   G1         90 or greater    Normal or high*   G2         60 - 89          Mild decrease*   G3a        45 - 59          Mild to moderate decrease   G3b        30 - 44          Moderate to severe decrease   G4         15 - 29          Severe decrease   G5         14 or less       Kidney failure                                              *In the absence of evidence of kidney damage, neither GFR category G1 or G2 fulfill the criteria for CKD  Kidney Int Suppl 2013, 3: 1-1 50  The following portions of the patient's history were reviewed and updated as appropriate: allergies, current medications, past family history, past medical history, past social history and past surgical history  Objective:    Blood pressure 130/82, pulse 98, weight 104 kg (230 lb), SpO2 98 %, not currently breastfeeding  Neurological Exam    On neurological examination patient is alert, awake, oriented and in no distress  Speech is fluent without dysarthria or aphasia  Cranial nerves 2-12 were symmetrically intact bilaterally  No evidence of any focal weakness or sensory loss in the upper or lower extremities  No fasciculations present  No abnormal involuntary movements  She is able to rise easily without assistance from a seated position  Casual gait is normal including stance, stride, and arm swing  ROS:    Review of Systems   Constitutional: Negative  Negative for appetite change and fever  HENT: Negative  Negative for hearing loss, tinnitus, trouble swallowing and voice change  Eyes: Negative  Negative for photophobia and pain  Respiratory: Negative  Negative for shortness of breath  Cardiovascular: Negative  Negative for palpitations  Gastrointestinal: Negative  Negative for nausea and vomiting  Endocrine: Negative  Negative for cold intolerance  Genitourinary: Negative  Negative for dysuria, frequency and urgency  Musculoskeletal: Positive for myalgias  Negative for neck pain  Skin: Negative  Negative for rash  Neurological: Positive for headaches  Negative for dizziness, tremors, seizures, syncope, facial asymmetry, speech difficulty, weakness, light-headedness and numbness  Patient states she gets migraines everyday  Patient states weakness in both hands  Hematological: Negative  Does not bruise/bleed easily  Psychiatric/Behavioral: Negative  Negative for confusion, hallucinations and sleep disturbance  Reviewed ROS as entered by medical assistant

## 2022-07-28 NOTE — PATIENT INSTRUCTIONS
- At the onset of headache use Tylenol or Ibuprofen, if no better within 30 minutes then do the below:  1  Use sumatriptan 50 mg at the onset of a migraine with Ketorolac, if no better in 2 hours take a 2nd dose of sumatriptan  2  If this fails take Olanzapine 2 5 mg at bedtime  3  If Migraine continues for more than 24 hours use Decadron (Dexmethasone) in the morning for 5 days to break cycle  - Do not use Ketorolac, Tylenol, Ibuprofen, or Sumatriptan more than 3 times per week

## 2022-07-28 NOTE — TELEPHONE ENCOUNTER
MSW phoned [facility] at [phone] to learn if patient would be eligible for assistance and spoke with Cielo Brewer reported that patient will need a new start/referrall form since patient did not start tecfidera when initially sent  Once start form is received, consent will be on file  They will then call patient to welcome patient  During the welcome call, they will speak to patient regarding financial assistance  They did note patient's $100 copay  MSW phoned patient to discuss same  Appreciative of call  Clinical: can you please assist with completing and  faxing new start form?    FAX: [phone]

## 2022-07-29 DIAGNOSIS — G35 MS (MULTIPLE SCLEROSIS) (HCC): ICD-10-CM

## 2022-07-29 DIAGNOSIS — J45.21 MILD INTERMITTENT ASTHMATIC BRONCHITIS WITH ACUTE EXACERBATION: ICD-10-CM

## 2022-07-29 DIAGNOSIS — M54.50 CHRONIC BILATERAL LOW BACK PAIN WITHOUT SCIATICA: ICD-10-CM

## 2022-07-29 DIAGNOSIS — E03.9 HYPOTHYROIDISM, UNSPECIFIED TYPE: ICD-10-CM

## 2022-07-29 DIAGNOSIS — G89.29 CHRONIC BILATERAL LOW BACK PAIN WITHOUT SCIATICA: ICD-10-CM

## 2022-07-29 DIAGNOSIS — K21.9 GASTROESOPHAGEAL REFLUX DISEASE WITHOUT ESOPHAGITIS: ICD-10-CM

## 2022-07-29 RX ORDER — METFORMIN HYDROCHLORIDE 500 MG/1
1000 TABLET, EXTENDED RELEASE ORAL 2 TIMES DAILY WITH MEALS
Qty: 120 TABLET | Refills: 1 | Status: SHIPPED | OUTPATIENT
Start: 2022-07-29 | End: 2022-08-10 | Stop reason: SDUPTHER

## 2022-08-02 ENCOUNTER — TELEPHONE (OUTPATIENT)
Dept: NEUROLOGY | Facility: CLINIC | Age: 32
End: 2022-08-02

## 2022-08-02 NOTE — TELEPHONE ENCOUNTER
Patient was scheduled to see Yissel on 12/26/22 at 9:45am      Patient was rescheduled due the office being closed on 12/26/22  Patient was scheduled for a sooner appt of 12/1/22 at 9am with Yissel  Patient agreed to new appt time/date and new appt was verified

## 2022-08-03 NOTE — TELEPHONE ENCOUNTER
MSW phoned [address] at [phone] and spoke with Terence Pratt reported that [employer] support spoke with patient yesterday, 08/02, for welcome call  Patient's insurance covers generic form of medication, where there will be a $59 copay  Terence reported that patient was agreeable to start the generic form and was fine with the $59 copay  MSW phoned patient to discuss same  Patient reports she is waiting for [address] to connect with her regarding the shipment process  Patient confirmed she is comfortable with the $59 copay  MSW encouraged patient to reach out should any additional financial concerns arise  Patient appreciated call  MSW will remain available for any future social needs

## 2022-08-04 ENCOUNTER — RA CDI HCC (OUTPATIENT)
Dept: OTHER | Facility: HOSPITAL | Age: 32
End: 2022-08-04

## 2022-08-04 NOTE — PROGRESS NOTES
CNS demyelinating disease ([facility] ) [G37 9]    [facility]  coding opportunities          Chart Reviewed number of suggestions sent to Provider: 1     Patients Insurance        Commercial Insurance: [address] [place] [address]

## 2022-08-10 ENCOUNTER — OFFICE VISIT (OUTPATIENT)
Dept: FAMILY MEDICINE CLINIC | Facility: CLINIC | Age: 32
End: 2022-08-10
Payer: COMMERCIAL

## 2022-08-10 VITALS
OXYGEN SATURATION: 98 % | HEIGHT: 64 IN | TEMPERATURE: 97 F | DIASTOLIC BLOOD PRESSURE: 80 MMHG | SYSTOLIC BLOOD PRESSURE: 122 MMHG | BODY MASS INDEX: 38.21 KG/M2 | HEART RATE: 84 BPM | WEIGHT: 223.8 LBS

## 2022-08-10 DIAGNOSIS — G43.709 CHRONIC MIGRAINE WITHOUT AURA WITHOUT STATUS MIGRAINOSUS, NOT INTRACTABLE: ICD-10-CM

## 2022-08-10 DIAGNOSIS — M54.50 CHRONIC BILATERAL LOW BACK PAIN WITHOUT SCIATICA: ICD-10-CM

## 2022-08-10 DIAGNOSIS — E03.9 HYPOTHYROIDISM, UNSPECIFIED TYPE: ICD-10-CM

## 2022-08-10 DIAGNOSIS — E28.2 POLYCYSTIC OVARIAN SYNDROME: Primary | ICD-10-CM

## 2022-08-10 DIAGNOSIS — G89.29 CHRONIC BILATERAL LOW BACK PAIN WITHOUT SCIATICA: ICD-10-CM

## 2022-08-10 DIAGNOSIS — K21.9 GASTROESOPHAGEAL REFLUX DISEASE WITHOUT ESOPHAGITIS: ICD-10-CM

## 2022-08-10 DIAGNOSIS — J45.21 MILD INTERMITTENT ASTHMATIC BRONCHITIS WITH ACUTE EXACERBATION: ICD-10-CM

## 2022-08-10 DIAGNOSIS — G35 MS (MULTIPLE SCLEROSIS) (HCC): ICD-10-CM

## 2022-08-10 DIAGNOSIS — G47.33 OSA (OBSTRUCTIVE SLEEP APNEA): ICD-10-CM

## 2022-08-10 PROCEDURE — 3725F SCREEN DEPRESSION PERFORMED: CPT | Performed by: FAMILY MEDICINE

## 2022-08-10 PROCEDURE — 99395 PREV VISIT EST AGE 18-39: CPT | Performed by: FAMILY MEDICINE

## 2022-08-10 RX ORDER — METFORMIN HYDROCHLORIDE 500 MG/1
1000 TABLET, EXTENDED RELEASE ORAL 2 TIMES DAILY WITH MEALS
Qty: 360 TABLET | Refills: 1 | Status: SHIPPED | OUTPATIENT
Start: 2022-08-10

## 2022-08-10 RX ORDER — LEVOTHYROXINE SODIUM 0.05 MG/1
50 TABLET ORAL DAILY
Qty: 90 TABLET | Refills: 1 | Status: SHIPPED | OUTPATIENT
Start: 2022-08-10

## 2022-08-10 NOTE — PROGRESS NOTES
ADULT ANNUAL [address]    NAME: Flaquito Rubi  AGE: 27 y o  SEX: female  : 1990     DATE: 8/10/2022     Assessment and Plan:     Problem List Items Addressed This Visit        Digestive    Gastroesophageal reflux disease without esophagitis     Stable GERD symptoms continue with diet and Pepcid 40 mg tablets         Relevant Medications    metFORMIN (GLUCOPHAGE-XR) 500 mg 24 hr tablet       Endocrine    Hypothyroidism    Relevant Medications    levothyroxine 50 mcg tablet    metFORMIN (GLUCOPHAGE-XR) 500 mg 24 hr tablet    Other Relevant Orders    Comprehensive metabolic panel    Lipid panel    Hemoglobin A1C    TSH, 3rd generation with Free T4 reflex    Polycystic ovarian syndrome - Primary     Patient is here for a follow-up evaluation she is overdue for an office visit and in need of medication refills particularly for metformin for polycystic ovarian syndrome  She has been stable I do recommend weight reduction and she has lost weight recently since April she is down 16 lb and I encouraged her to continue weight reduction  Next wek Gyn evak as menses abnormal         Relevant Orders    Comprehensive metabolic panel    Lipid panel    Hemoglobin A1C    TSH, 3rd generation with Free T4 reflex       Respiratory    Asthmatic bronchitis with acute exacerbation    Relevant Medications    metFORMIN (GLUCOPHAGE-XR) 500 mg 24 hr tablet    ARINA (obstructive sleep apnea)     Sleep apnea will need home sleep study to determine sleep apnea              Cardiovascular and Mediastinum    Chronic migraine without aura without status migrainosus, not intractable     Migraines stable continue medications as directed            Nervous and Auditory    MS (multiple sclerosis) ([facility] )     Patient is followed up by Neurology and continuing on her current medication regimen         Relevant Medications    metFORMIN (GLUCOPHAGE-XR) 500 mg 24 hr tablet Other    Chronic bilateral low back pain without sciatica    Relevant Medications    metFORMIN (GLUCOPHAGE-XR) 500 mg 24 hr tablet          Immunizations and preventive care screenings were discussed with patient today  Appropriate education was printed on patient's after visit summary  Counseling:  Alcohol/drug use: discussed moderation in alcohol intake, the recommendations for healthy alcohol use, and avoidance of illicit drug use  Dental Health: discussed importance of regular tooth brushing, flossing, and dental visits  Injury prevention: discussed safety/seat belts, safety helmets, smoke detectors, carbon dioxide detectors, and smoking near bedding or upholstery  Sexual health: discussed sexually transmitted diseases, partner selection, use of condoms, avoidance of unintended pregnancy, and contraceptive alternatives  · Exercise: the importance of regular exercise/physical activity was discussed  Recommend exercise 3-5 times per week for at least 30 minutes  Return in about 6 months (around 2/10/2023)  Chief Complaint:     Chief Complaint   Patient presents with    Medication Refill     Pt reports to the [facility] for a med refill  Pt is due for her Annual Physical          History of Present Illness:     Adult Annual Physical   Patient here for a comprehensive physical exam  The patient reports no problems  Diet and Physical Activity  · Diet/Nutrition: well balanced diet  · Exercise: no formal exercise  Depression Screening  PHQ-2/9 Depression Screening    Little interest or pleasure in doing things: 0 - not at all  Feeling down, depressed, or hopeless: 0 - not at all  PHQ-2 Score: 0  PHQ-2 Interpretation: Negative depression screen       General Health  · Sleep: sleeps well  · Hearing: normal - bilateral   · Vision: no vision problems  · Dental: regular dental visits         /GYN Health  · Last menstrual period:  Unchanged regular  · Contraceptive method:  See medication list  · History of STDs?: no     Review of Systems:     Review of Systems   Constitutional: Negative for chills, fatigue and fever  HENT: Negative for congestion, nosebleeds, rhinorrhea, sinus pressure and sore throat  Eyes: Negative for discharge and redness  Respiratory: Negative for cough and shortness of breath  Cardiovascular: Negative for chest pain, palpitations and leg swelling  Gastrointestinal: Negative for abdominal pain, blood in stool and nausea  Endocrine: Negative for cold intolerance, heat intolerance and polyuria  Genitourinary: Negative for dysuria and frequency  Musculoskeletal: Negative for arthralgias, back pain and myalgias  Skin: Negative for rash  Neurological: Negative for dizziness, weakness and headaches  Hematological: Negative for adenopathy  Psychiatric/Behavioral: Negative for behavioral problems and sleep disturbance  The patient is not nervous/anxious         Past Medical History:     Past Medical History:   Diagnosis Date    Migraine     MS (multiple sclerosis) ([facility] )     Thyroid disease       Past Surgical History:     Past Surgical History:   Procedure Laterality Date    IR SPINE PROCEDURE        Social History:     Social History     Socioeconomic History    Marital status: Single     Spouse name: None    Number of children: None    Years of education: None    Highest education level: None   Occupational History    None   Tobacco Use    Smoking status: Current Every Day Smoker     Packs/day: 1 00    Smokeless tobacco: Current User   Vaping Use    Vaping Use: Every day   Substance and Sexual Activity    Alcohol use: No    Drug use: No    Sexual activity: Yes   Other Topics Concern    None   Social History Narrative    None     Social Determinants of Health     Financial Resource Strain: Not on file   Food Insecurity: Not on file   Transportation Needs: Not on file   Physical Activity: Not on file   Stress: Not on file   Social Connections: Not on file   Intimate Partner Violence: Not on file   Housing Stability: Not on file      Family History:     Family History   Problem Relation Age of Onset    Diabetes Mother     Lupus Mother     Hypertension Mother     Cancer Maternal Grandmother     Heart failure Paternal Grandfather       Current Medications:     Current Outpatient Medications   Medication Sig Dispense Refill    dexamethasone (DECADRON) 2 mg tablet Take 1 tablet (2 mg total) by mouth daily with breakfast 5 tablet 4    fluticasone (FLONASE) 50 mcg/act nasal spray       gabapentin (NEURONTIN) 100 mg capsule TAKE ONE CAPSULE BY MOUTH THREE TIMES DAILY 90 capsule 0    levothyroxine 50 mcg tablet Take 1 tablet (50 mcg total) by mouth daily 90 tablet 1    metFORMIN (GLUCOPHAGE-XR) 500 mg 24 hr tablet Take 2 tablets (1,000 mg total) by mouth 2 (two) times a day with meals 4tabs daily 360 tablet 1    SUMAtriptan (Imitrex) 50 mg tablet Take 1 tab at the onset of a migraine with ketorolac, you may take another dose of sumatriptan in 2 hours if needed 9 tablet 3    topiramate (TOPAMAX) 25 mg sprinkle capsule Take 1 capsule (25 mg total) by mouth 2 (two) times a day 60 capsule 3    VELIVET 0 1/0 125/0 15 -0 025 MG tablet Take 1 tablet by mouth daily       Dimethyl Fumarate 120 & 240 MG MISC Take 120 mg by mouth 2 (two) times a day for 7 days, THEN 240 mg 2 (two) times a day for 23 days  1 each 0    famotidine (PEPCID) 40 MG tablet Take 0 5 tablets (20 mg total) by mouth daily (Patient not taking: No sig reported) 30 tablet 5    ketorolac (TORADOL) 10 mg tablet Take 1 tablet at the onset of a migraine headache with sumatriptan (Patient not taking: No sig reported) 10 tablet 2    OLANZapine (ZyPREXA) 2 5 mg tablet Take 1 tablet (2 5 mg total) by mouth daily (Patient not taking: No sig reported) 5 tablet 5     No current facility-administered medications for this visit        Allergies:     No Known Allergies   Physical Exam:     /80 (BP Location: Left arm, Patient Position: Sitting)   Pulse 84   Temp (!) 97 °F (36 1 °C)   Ht 5' 4" (1 626 m)   Wt 102 kg (223 lb 12 8 oz)   SpO2 98%   BMI 38 42 kg/m²     Physical Exam  Vitals and nursing note reviewed  Constitutional:       General: She is not in acute distress  Appearance: Normal appearance  She is well-developed and normal weight  HENT:      Head: Normocephalic and atraumatic  Right Ear: Tympanic membrane, ear canal and external ear normal       Left Ear: Tympanic membrane, ear canal and external ear normal       Nose: Nose normal       Mouth/Throat:      Mouth: Mucous membranes are moist       Pharynx: Oropharynx is clear  Eyes:      Extraocular Movements: Extraocular movements intact  Conjunctiva/sclera: Conjunctivae normal       Pupils: Pupils are equal, round, and reactive to light  Cardiovascular:      Rate and Rhythm: Normal rate and regular rhythm  Pulses: Normal pulses  Heart sounds: Normal heart sounds  No murmur heard  Pulmonary:      Effort: Pulmonary effort is normal  No respiratory distress  Breath sounds: Normal breath sounds  Abdominal:      Palpations: Abdomen is soft  Tenderness: There is no abdominal tenderness  Musculoskeletal:         General: Normal range of motion  Cervical back: Normal range of motion and neck supple  Skin:     General: Skin is warm and dry  Capillary Refill: Capillary refill takes less than 2 seconds  Neurological:      General: No focal deficit present  Mental Status: She is alert and oriented to person, place, and time  Mental status is at baseline  Psychiatric:         Mood and Affect: Mood normal          Behavior: Behavior normal          Thought Content:  Thought content normal          Judgment: Judgment normal           DO RAMONA Reyes [address]

## 2022-08-10 NOTE — ASSESSMENT & PLAN NOTE
Patient is here for a follow-up evaluation she is overdue for an office visit and in need of medication refills particularly for metformin for polycystic ovarian syndrome  She has been stable I do recommend weight reduction and she has lost weight recently since April she is down 16 lb and I encouraged her to continue weight reduction   Next wek Gyn pavan as menses abnormal

## 2022-08-18 NOTE — TELEPHONE ENCOUNTER
Call [facility] at   [phone] ,spoke to Christen Goldberg   and confirmed that script for  dimethyl fumarate was received, stated that PA is needed  On media we had the PA approval for dimethyl fumarate 120 and 240 mg  Effective from 04/26/2022 through 04/26/2023 aproval for maintnace dose on CMM  Key-: [id number]  Starter pack was approved from 5/13/22-6/13/2022    [phone]- help desk was called  regarding PA spoke to  [facility], stated that she was able to extend the PA for  starter pack date till 8/31/22-[id number]-, while on the phone with me called [place] with [employer]  on the line and provided him with PA for both starter and maintnance dose,he was not able to run the claim again, was giving him regection  [employer]ar A  from [address] stated that he will have his suppervisor run the script again  [facility] got on the phone she said she will have ther benefits team upload all the information in order to process the script  They will call us if any issue,and will call pt when script is ready     5/13/22-8/31/22- -[id number]- started dose   9/1/22-6/13/23- -[id number] for  maintnace dose

## 2022-08-18 NOTE — TELEPHONE ENCOUNTER
Start form was written for NICOLASA  Are you agreeable to generic?     If so can you complete new start form for generic or send script to [facility]

## 2022-08-18 NOTE — TELEPHONE ENCOUNTER
Vitaly Noriega  to [facility] [address] [place] [address], [place]          8/16/22 1:39 PM  Hey so I havent gotten anything from [facility] they were uncertain if you had put in for a generic brand or the actual name brand tecfedera  And then I was on hold for over an hour waiting for a response  My insurance only covers the Generic brand  If you could reach out to the  again and confirm this that would be helpful   Thank you

## 2022-08-18 NOTE — TELEPHONE ENCOUNTER
Generic was sent to [facility] in April  I resent it now  Please follow up to ensure script is received and patient will obtain medication

## 2022-08-20 ENCOUNTER — APPOINTMENT (OUTPATIENT)
Dept: LAB | Facility: CLINIC | Age: 32
End: 2022-08-20
Payer: COMMERCIAL

## 2022-08-20 DIAGNOSIS — E28.2 POLYCYSTIC OVARIAN SYNDROME: ICD-10-CM

## 2022-08-20 DIAGNOSIS — E03.9 HYPOTHYROIDISM, UNSPECIFIED TYPE: ICD-10-CM

## 2022-08-20 DIAGNOSIS — G35 MS (MULTIPLE SCLEROSIS) (HCC): ICD-10-CM

## 2022-08-20 DIAGNOSIS — E55.9 MILD VITAMIN D DEFICIENCY: ICD-10-CM

## 2022-08-20 LAB
25(OH)D3 SERPL-MCNC: 31.4 NG/ML (ref 30–100)
ALBUMIN SERPL BCP-MCNC: 3.1 G/DL (ref 3.5–5)
ALP SERPL-CCNC: 82 U/L (ref 46–116)
ALT SERPL W P-5'-P-CCNC: 20 U/L (ref 12–78)
ANION GAP SERPL CALCULATED.3IONS-SCNC: 8 MMOL/L (ref 4–13)
AST SERPL W P-5'-P-CCNC: 13 U/L (ref 5–45)
BILIRUB SERPL-MCNC: 0.28 MG/DL (ref 0.2–1)
BUN SERPL-MCNC: 12 MG/DL (ref 5–25)
CALCIUM ALBUM COR SERPL-MCNC: 9.5 MG/DL (ref 8.3–10.1)
CALCIUM SERPL-MCNC: 8.8 MG/DL (ref 8.3–10.1)
CHLORIDE SERPL-SCNC: 109 MMOL/L (ref 96–108)
CHOLEST SERPL-MCNC: 180 MG/DL
CO2 SERPL-SCNC: 22 MMOL/L (ref 21–32)
CREAT SERPL-MCNC: 1 MG/DL (ref 0.6–1.3)
EST. AVERAGE GLUCOSE BLD GHB EST-MCNC: 120 MG/DL
GFR SERPL CREATININE-BSD FRML MDRD: 73 ML/MIN/1.73SQ M
GLUCOSE P FAST SERPL-MCNC: 103 MG/DL (ref 65–99)
HBA1C MFR BLD: 5.8 %
HDLC SERPL-MCNC: 42 MG/DL
LDLC SERPL CALC-MCNC: 100 MG/DL (ref 0–100)
NONHDLC SERPL-MCNC: 138 MG/DL
POTASSIUM SERPL-SCNC: 4.1 MMOL/L (ref 3.5–5.3)
PROT SERPL-MCNC: 7.5 G/DL (ref 6.4–8.4)
SODIUM SERPL-SCNC: 139 MMOL/L (ref 135–147)
TRIGL SERPL-MCNC: 192 MG/DL
TSH SERPL DL<=0.05 MIU/L-ACNC: 1.7 UIU/ML (ref 0.45–4.5)

## 2022-08-20 PROCEDURE — 80053 COMPREHEN METABOLIC PANEL: CPT

## 2022-08-20 PROCEDURE — 36415 COLL VENOUS BLD VENIPUNCTURE: CPT

## 2022-08-20 PROCEDURE — 82306 VITAMIN D 25 HYDROXY: CPT

## 2022-08-20 PROCEDURE — 84443 ASSAY THYROID STIM HORMONE: CPT

## 2022-08-20 PROCEDURE — 83036 HEMOGLOBIN GLYCOSYLATED A1C: CPT

## 2022-08-20 PROCEDURE — 80061 LIPID PANEL: CPT

## 2022-08-23 NOTE — TELEPHONE ENCOUNTER
Called pt to check if she was able to get her medication,left a detailed message to call us if she is having any issues

## 2022-08-29 ENCOUNTER — RA CDI HCC (OUTPATIENT)
Dept: OTHER | Facility: HOSPITAL | Age: 32
End: 2022-08-29

## 2022-08-29 NOTE — PROGRESS NOTES
CNS demyelinating disease ([facility] ) [G37 9]  ============================================  E66 01: Morbid (severe) obesity due to excess calories ([facility] )     Per CMS/ICD 10 coding guidelines, to be used when BMI > 35 & <40 with one or more comorbidity (DM, HTN, or ARINA)    [facility]  coding opportunities          Chart Reviewed number of suggestions sent to Provider: 2     Patients Insurance        Commercial Insurance: [address] [place] [address]

## 2022-08-30 ENCOUNTER — OFFICE VISIT (OUTPATIENT)
Dept: FAMILY MEDICINE CLINIC | Facility: CLINIC | Age: 32
End: 2022-08-30
Payer: COMMERCIAL

## 2022-08-30 VITALS
OXYGEN SATURATION: 97 % | TEMPERATURE: 97.9 F | DIASTOLIC BLOOD PRESSURE: 80 MMHG | HEART RATE: 93 BPM | WEIGHT: 233 LBS | BODY MASS INDEX: 39.78 KG/M2 | SYSTOLIC BLOOD PRESSURE: 120 MMHG | HEIGHT: 64 IN | RESPIRATION RATE: 18 BRPM

## 2022-08-30 DIAGNOSIS — J30.9 ALLERGIC SINUSITIS: ICD-10-CM

## 2022-08-30 DIAGNOSIS — H61.22 HEARING LOSS OF LEFT EAR DUE TO CERUMEN IMPACTION: Primary | ICD-10-CM

## 2022-08-30 PROCEDURE — 99213 OFFICE O/P EST LOW 20 MIN: CPT | Performed by: FAMILY MEDICINE

## 2022-08-30 PROCEDURE — 69209 REMOVE IMPACTED EAR WAX UNI: CPT | Performed by: FAMILY MEDICINE

## 2022-08-30 NOTE — ASSESSMENT & PLAN NOTE
Continue with antihistamines and add Sudafed 12 hour tablets and if not improving call back for prednisone

## 2022-08-30 NOTE — PROGRESS NOTES
BMI Counseling: Body mass index is 39 99 kg/m²  The BMI is above normal  Nutrition recommendations include reducing portion sizes, decreasing overall calorie intake, 3-5 servings of fruits/vegetables daily, reducing fast food intake, consuming healthier snacks, decreasing soda and/or juice intake, moderation in carbohydrate intake and reducing intake of saturated fat and trans fat  Exercise recommendations include exercising 3-5 times per week

## 2022-08-30 NOTE — PROGRESS NOTES
Assessment/Plan:       Problem List Items Addressed This Visit        Respiratory    Allergic sinusitis     Continue with antihistamines and add Sudafed 12 hour tablets and if not improving call back for prednisone            Nervous and Auditory    Hearing loss of left ear due to cerumen impaction - Primary     Irrigated with good results                 Subjective:      Patient ID: Velia Poon is a 27 y o  female  Patient presents today for blocked ear and sinus congestion      The following portions of the patient's history were reviewed and updated as appropriate: allergies, current medications, past family history, past medical history, past social history, past surgical history and problem list     Review of Systems   Constitutional: Negative for chills, fatigue and fever  HENT: Positive for postnasal drip, rhinorrhea and sinus pressure  Negative for congestion, nosebleeds and sore throat  Eyes: Negative for discharge and redness  Respiratory: Negative for cough and shortness of breath  Cardiovascular: Negative for chest pain, palpitations and leg swelling  Gastrointestinal: Negative for abdominal pain, blood in stool and nausea  Endocrine: Negative for cold intolerance, heat intolerance and polyuria  Genitourinary: Negative for dysuria and frequency  Musculoskeletal: Negative for arthralgias, back pain and myalgias  Skin: Negative for rash  Neurological: Negative for dizziness, weakness and headaches  Hematological: Negative for adenopathy  Psychiatric/Behavioral: Negative for behavioral problems and sleep disturbance  The patient is not nervous/anxious  Objective:      /80 (BP Location: Left arm, Patient Position: Sitting)   Pulse 93   Temp 97 9 °F (36 6 °C)   Resp 18   Ht 5' 4" (1 626 m)   Wt 106 kg (233 lb)   SpO2 97%   BMI 39 99 kg/m²        Physical Exam  Vitals and nursing note reviewed  Constitutional:       General: She is not in acute distress  Appearance: Normal appearance  She is well-developed  She is obese  HENT:      Head: Normocephalic and atraumatic  Right Ear: External ear normal       Left Ear: External ear normal  There is impacted cerumen  Nose: Congestion and rhinorrhea present  Mouth/Throat:      Mouth: Mucous membranes are moist       Pharynx: No oropharyngeal exudate  Eyes:      General: No scleral icterus  Right eye: No discharge  Left eye: No discharge  Conjunctiva/sclera: Conjunctivae normal       Pupils: Pupils are equal, round, and reactive to light  Neck:      Thyroid: No thyromegaly  Vascular: No JVD  Cardiovascular:      Rate and Rhythm: Normal rate and regular rhythm  Heart sounds: Normal heart sounds  No murmur heard  Pulmonary:      Effort: Pulmonary effort is normal       Breath sounds: No wheezing or rales  Chest:      Chest wall: No tenderness  Abdominal:      General: Bowel sounds are normal  There is no distension  Palpations: Abdomen is soft  There is no mass  Tenderness: There is no abdominal tenderness  Musculoskeletal:         General: No tenderness or deformity  Normal range of motion  Cervical back: Normal range of motion  Lymphadenopathy:      Cervical: No cervical adenopathy  Skin:     General: Skin is warm and dry  Findings: No rash  Neurological:      Mental Status: She is alert and oriented to person, place, and time  Cranial Nerves: No cranial nerve deficit  Coordination: Coordination normal       Deep Tendon Reflexes: Reflexes are normal and symmetric  Reflexes normal    Psychiatric:         Behavior: Behavior normal          Thought Content: Thought content normal          Judgment: Judgment normal           Ear cerumen removal    Date/Time: 8/30/2022 7:04 PM  Performed by: Genna Yanez DO  Authorized by: Genna Yanze DO   Universal Protocol:  Consent: Verbal consent obtained    Consent given by: patient  Patient understanding: patient states understanding of the procedure being performed  Patient identity confirmed: verbally with patient      Patient location:  Clinic  Procedure details:     Local anesthetic:  None    Location:  L ear    Procedure type: irrigation only      Approach:  External  Post-procedure details:     Complication:  None    Hearing quality:  Improved    Patient tolerance of procedure: Tolerated well, no immediate complications      Data:    Laboratory Results: I have personally reviewed the pertinent laboratory results/reports   Radiology/Other Diagnostic Testing Results: I have personally reviewed pertinent reports         Lab Results   Component Value Date    WBC 15 28 (H) 04/01/2014    HGB 12 1 04/01/2014    HCT 35 6 04/01/2014    MCV 90 04/01/2014     04/01/2014     Lab Results   Component Value Date     (L) 03/16/2014    K 4 1 08/20/2022     (H) 08/20/2022    CO2 22 08/20/2022    ANIONGAP 10 03/16/2014    BUN 12 08/20/2022    CREATININE 1 00 08/20/2022    GLUCOSE 104 03/16/2014    GLUF 103 (H) 08/20/2022    CALCIUM 8 8 08/20/2022    CORRECTEDCA 9 5 08/20/2022    AST 13 08/20/2022    ALT 20 08/20/2022    ALKPHOS 82 08/20/2022    EGFR 73 08/20/2022     Lab Results   Component Value Date    CHOLESTEROL 180 08/20/2022     Lab Results   Component Value Date    HDL 42 (L) 08/20/2022     Lab Results   Component Value Date    LDLCALC 100 08/20/2022     Lab Results   Component Value Date    TRIG 192 (H) 08/20/2022     No results found for: [place], [place]  Lab Results   Component Value Date    SRL6SHLRIPTJ 1 700 08/20/2022     Lab Results   Component Value Date    HGBA1C 5 8 (H) 08/20/2022     No results found for: CHARLINE Atkinson DO

## 2022-09-29 DIAGNOSIS — G35 MS (MULTIPLE SCLEROSIS) (HCC): ICD-10-CM

## 2022-09-29 RX ORDER — DIMETHYL FUMARATE 240 MG/1
240 CAPSULE ORAL 2 TIMES DAILY
Qty: 60 CAPSULE | Refills: 11 | Status: SHIPPED | OUTPATIENT
Start: 2022-09-29

## 2022-09-29 NOTE — TELEPHONE ENCOUNTER
Sid Brooks RN routed this conversation to Neurology [facility] Clinical Team 3       Kareem SOTO  to [facility], [place]          2:06 PM  Yes I do!! Thank you let me know when its completed hopefully the pharmacy calls me today      Me  to Loyd MERA      1:40 PM  Julius Epps,      Just to confirm, you need a new prescription for generic Tecfidera (dimethyl fumarate)? Let us know, thanks!     Christie WALKER    Last read by Yenni Moreno at 2:06 PM on 9/29/2022 September 28, 2022    [facility], [address]  to Neurology [facility] Clinical Team 3          10:25 AM  Can you look into if a script is needed? I'm not sure what she means by today is her last day of generic? Do I have to place a script for brand?                10:14 AM  Sid Brooks RN routed this conversation to [place] Briseno Lela Solian A  to [facility], [address]          9:43 AM  Konstantin Primrose! So today is my last day of my generic tecfidera and the specialty pharmacy said you have to put a script in for a refill  Was wondering if you could do that for me? Thank you!

## 2022-09-29 NOTE — TELEPHONE ENCOUNTER
Patient called in stating she is out of her Tecfidera  Her specialty pharmacy is [facility]  Copied and pasted transcribed message  ( Fauzia Benito- transcribed message does not translate well into actual words that make sense)         Hi, my name is Mona Restrepo  I'm calling to get authorization for a prescription refill for the generic check  For dera, the specialty pharmacy [facility]  I'm pretty sure I'm all out of it  So I need an liza to approve it so that pharmacy can fill it for me and ship it to my house  If you guys can give me a call back  That be great  Thank you  There is also a My Chart encounter from 9/28/22 regarding the same request     Script in chart is for a Starter pack  This request is for a continuation of medication      # [phone]

## 2022-10-29 PROBLEM — H61.22 HEARING LOSS OF LEFT EAR DUE TO CERUMEN IMPACTION: Status: RESOLVED | Noted: 2022-08-30 | Resolved: 2022-10-29

## 2022-10-29 PROBLEM — J30.9 ALLERGIC SINUSITIS: Status: RESOLVED | Noted: 2022-08-30 | Resolved: 2022-10-29

## 2022-11-04 ENCOUNTER — TELEPHONE (OUTPATIENT)
Dept: NEUROLOGY | Facility: CLINIC | Age: 32
End: 2022-11-04

## 2022-11-04 NOTE — TELEPHONE ENCOUNTER
Confirmed appt for 12/1/2022 at [address] [place] [address] with [facility] in Neurology in [place]

## 2022-11-10 ENCOUNTER — TELEPHONE (OUTPATIENT)
Dept: NEUROLOGY | Facility: CLINIC | Age: 32
End: 2022-11-10

## 2022-11-18 ENCOUNTER — HOSPITAL ENCOUNTER (OUTPATIENT)
Dept: SLEEP CENTER | Facility: CLINIC | Age: 32
Discharge: HOME | End: 2022-11-18

## 2022-11-18 DIAGNOSIS — G47.33 OSA (OBSTRUCTIVE SLEEP APNEA): ICD-10-CM

## 2022-11-19 NOTE — PROGRESS NOTES
Sleep Study Documentation    Pre-Sleep Study       Sleep testing procedure explained to patient:YES    Patient napped prior to study:NO    Caffeine:Dayshift worker after 12PM   Caffeine use:YES- ice tea  12 ounces    Alcohol:Dayshift workers after 5PM: Alcohol use:NO    Typical day for patient:YES       Study Documentation    Sleep Study Indications: Snoring, Chronic or AM headache, Unrefreshing sleep, Excessive Daytime Sleepiness, Impaired concentration/memory    Sleep Study: Diagnostic   Snore:Mild  Supplemental O2: no    O2 flow rate (L/min) range   O2 flow rate (L/min) final   Minimum SaO2 86%  Baseline SaO2 94%    EKG abnormalities: yes:  EPOCH example and comments: Possible PVC's    EEG abnormalities: no    Sleep Study Recorded < 2 hours: N/A    Sleep Study Recorded > 2 hours but incomplete study: N/A    Sleep Study Recorded 6 hours but no sleep obtained: NO    Patient classification: employed       Post-Sleep Study    Medication used at bedtime or during sleep study:YES over the counter sleep aid    Patient reports time it took to fall asleep:less than 20 minutes    Patient reports waking up during study:1 to 2 times  Patient reports returning to sleep in 10 to 30 minutes  Patient reports sleeping 4 to 6 hours without dreaming  Patient reports sleep during study:typical    Patient rated sleepiness: Somewhat sleepy or tired    PAP treatment:no

## 2022-12-01 ENCOUNTER — OFFICE VISIT (OUTPATIENT)
Dept: NEUROLOGY | Facility: CLINIC | Age: 32
End: 2022-12-01

## 2022-12-01 VITALS
HEART RATE: 90 BPM | RESPIRATION RATE: 16 BRPM | SYSTOLIC BLOOD PRESSURE: 140 MMHG | TEMPERATURE: 98.4 F | WEIGHT: 227.7 LBS | DIASTOLIC BLOOD PRESSURE: 76 MMHG | BODY MASS INDEX: 39.08 KG/M2

## 2022-12-01 DIAGNOSIS — E28.2 POLYCYSTIC OVARIAN SYNDROME: ICD-10-CM

## 2022-12-01 DIAGNOSIS — G35 MS (MULTIPLE SCLEROSIS) (HCC): Primary | ICD-10-CM

## 2022-12-01 DIAGNOSIS — G43.709 CHRONIC MIGRAINE WITHOUT AURA WITHOUT STATUS MIGRAINOSUS, NOT INTRACTABLE: ICD-10-CM

## 2022-12-01 RX ORDER — KETOROLAC TROMETHAMINE 10 MG/1
TABLET, FILM COATED ORAL
Qty: 10 TABLET | Refills: 2 | Status: SHIPPED | OUTPATIENT
Start: 2022-12-01

## 2022-12-01 RX ORDER — DIMETHYL FUMARATE 240 MG/1
240 CAPSULE ORAL 2 TIMES DAILY
Qty: 180 CAPSULE | Refills: 3 | Status: SHIPPED | OUTPATIENT
Start: 2022-12-01 | End: 2022-12-01

## 2022-12-01 RX ORDER — DIMETHYL FUMARATE 240 MG/1
240 CAPSULE ORAL 2 TIMES DAILY
Qty: 180 CAPSULE | Refills: 3 | Status: SHIPPED | OUTPATIENT
Start: 2022-12-01

## 2022-12-01 NOTE — PATIENT INSTRUCTIONS
- Continue Dimethyl Fumerate twice daily; can consider taking aspirin/tylenol before your second dose to assist with flushing  - Continue off of Topiramate at this time  - Referral to Gynecology for PCOS  - Complete lab work  - Follow up in 6 months; sooner if needed  - Call with any new symptoms  - Have a great wedding!!

## 2022-12-01 NOTE — ASSESSMENT & PLAN NOTE
Improved as of recently now occurring twice per week, easily aborted within 30 minutes with Tylenol/ibuprofen  She is no longer taking Topiramate and has noted resolution of numbness/tingling  Plan:  -She will continue off of Topiramate at this time  - At the onset of headache use Tylenol or Ibuprofen, if no better within 30 minutes then do the below:  1  Use sumatriptan 50 mg at the onset of a migraine with Ketorolac, if no better in 2 hours take a 2nd dose of sumatriptan  2  If this fails take Olanzapine 2 5 mg at bedtime  3  If Migraine continues for more than 24 hours use Decadron (Dexmethasone) in the morning for 5 days to break cycle  - Do not use Ketorolac, Tylenol, Ibuprofen, or Sumatriptan more than 3 times per week

## 2022-12-01 NOTE — PROGRESS NOTES
Patient ID: Palak uNnez is a 27 y o  female  Assessment/Plan:    Polycystic ovarian syndrome  Referral to [place] [address] Gynecology for hx of PCOS and menorrhagia       Chronic migraine without aura without status migrainosus, not intractable  Improved as of recently now occurring twice per week, easily aborted within 30 minutes with Tylenol/ibuprofen  She is no longer taking Topiramate and has noted resolution of numbness/tingling  Plan:  -She will continue off of Topiramate at this time  - At the onset of headache use Tylenol or Ibuprofen, if no better within 30 minutes then do the below:  1  Use sumatriptan 50 mg at the onset of a migraine with Ketorolac, if no better in 2 hours take a 2nd dose of sumatriptan  2  If this fails take Olanzapine 2 5 mg at bedtime  3  If Migraine continues for more than 24 hours use Decadron (Dexmethasone) in the morning for 5 days to break cycle  - Do not use Ketorolac, Tylenol, Ibuprofen, or Sumatriptan more than 3 times per week  MS (multiple sclerosis) ([facility] )  Hannah Ballard is a 27year old female who presents to the office today to follow up on her MS and related concerns  She continues on Dimethyl Fumerate 240 mg bid started 8/2022 without any adverse effects, with the exception of flushing in the evening  She had stable radiographic findings 11/24/21  She denies any new focal neurologic complaints since she was last seen that are concerning for relapse  She denies any weakness, imbalance, dizziness, bowel/bladder change, or vision change/eye pain  She denies any recent illness or infection including URI or UTI  Due to her clinical and radiographic stability we will defer any additional MRI imaging at this time  We may consider repeating MRIs in summer 2023   She will continue on Dimethyl Fumerate (I did send a 90 day supply to minimize the risk of therapy disruption due to shipping delays) and I advised she may pre-medicate in the evening with tylenol and aspirin to assist with flushing  I have asked her to complete an updated CBC with differential to evaluate her absolute lymphocyte counts  She will continue on Gabapentin as needed instead of scheduled, as she no longer feels it is necessary to use every night as she has less pain  We discussed having her follow up in 4-5 months however she would prefer to follow up in 6 months after her wedding in May 2023  She will call with any new symptoms in the meantime  Plan:  - Continue Dimethyl Fumerate twice daily; can consider taking aspirin/tylenol before your second dose to assist with flushing  - Complete CBC with differential  - You may use Gabapentin 100-200 mg at bedtime as needed   - Follow up in 6 months; sooner if needed  - Call with any new symptoms  - Have a great wedding!!       Diagnoses and all orders for this visit:    [facility] ([facility] )  -     CBC and differential; Future  -     Discontinue: Dimethyl Fumarate 240 MG CPDR; Take 1 capsule (240 mg total) by mouth 2 (two) times a day  -     Dimethyl Fumarate 240 MG CPDR; Take 1 capsule (240 mg total) by mouth 2 (two) times a day  -     ketorolac (TORADOL) 10 mg tablet; Take 1 tablet at the onset of a migraine headache with sumatriptan    Chronic migraine without aura without status migrainosus, not intractable    Polycystic ovarian syndrome  -     Ambulatory Referral to Gynecology; Future         I have spent a total of 40 minutes in face to face time and chart review with this patient today  Subjective:     LUZ Ballard is a 33 year old female who presents to the office today to follow up on her MS and related concerns  She was last seen 7/28/22      Multiple Sclerosis  She was maintained on Copaxone (site reactions) and then Aubagio (diffuse body pain) in the past  She has been off of Aubagio since April 2021 without known disease progression  Since her last visit she has started Dimethyl Fumerate (8/2022)   She states she is tolerating this well and denies any GI side effects  Although she is having flushing of the face and chest in the evening  There was a delay in shipping her medication from the pharmacy last month, therefore she was without medication for 1 week  However, she denies any new focal neurologic complaints since she was last seen that are concerning for relapse  She denies any weakness, imbalance, dizziness, bowel/bladder change, or vision change/eye pain  She denies any recent illness or infection including URI or UTI      Muscle Pain/Spasms  At her last visit she was having more generalized body aches however she also was not consistently taking her gabapentin 200 mg at bedtime  She states since she was last seen her body aches have improved although she does have some chronic low back pain  She states overall she is under less stress although unfortunately her father was recently diagnosed with cancer  She denies any focal motor or sensory symptoms and denies any red flag symptoms  She does continue to use medical marijuana (smokes, vapes, edibles) at bedtime which she feels is beneficial      Sleep disturbance   She has completed a follow up in lab sleep study as her prior home study was concerning for very mild ARINA with significant episodes of hypoxia  Her follow up study was negative for any significant obstructive sleep apnea       Migraine headaches   Her migraine headaches occur on average 2 times a week, but are easily aborted with tylenol or ibuprofen  She tells me she ran out of refills of Topamax  2 months ago  She had been taking 50 mg once daily  Since discontinuing Topiramate she has not noticed any increase in her headaches and has noted a complete resolution of the numbness/tingling she had been experiencing in her hands and feet  She does have Ketorolac, Sumatriptan, Olanzapine, and Decadron prescribed as abortives but states she has not needed to take any of them since she was last seen       Other  She has PCOS with related menorrhagia  She would like to switch her gynecologist to [facility] since the rest of her providers are [facility] affiliated       Review of most recent imaging/testing on file:     MRI Brain 11/24/21  Unchanged multiple white matter lesions in a distribution compatible with multiple sclerosis  No new or enhancing lesions      MRI Cervical Spine 11/24/21  No evidence of demyelinating lesion in cervical spine   No abnormal enhancement      MRI Lumbar Spine 9/13/19  No significant degenerative changes   No canal or significant foraminal stenosis at any levels      Labs  She has not had a recent CBC with differential  Last completed 4/8/2022, at that time her absolute lymphocytes were 0 9      The following portions of the patient's history were reviewed and updated as appropriate: allergies, current medications, past family history, past medical history, past social history and past surgical history         Objective:     Blood pressure 140/76, pulse 90, temperature 98 4 °F (36 9 °C), temperature source Temporal, resp  rate 16, weight 103 kg (227 lb 11 2 oz), not currently breastfeeding      Neurological Exam     On neurological examination patient is alert, awake, oriented and in no distress  Speech is fluent without dysarthria or aphasia  Cranial nerves 2-12 were symmetrically intact bilaterally  No evidence of any focal weakness or sensory loss in the upper or lower extremities  Motor testing reveals 5/5 strength of the bilateral upper and lower extremities  There was no pronator drift   No fasciculations present  No abnormal involuntary movements  Finger- to-nose reveals no tremor or ataxia and intact proprioceptive function, no dysmetria was noted  Sensation was intact to vibration, light touch, and temperature in bilateral upper and lower extremities  Deep tendon reflexes were 2+ and symmetric in the bilateral upper and lower extremities   She is able to rise easily without assistance from a seated position  Casual gait is normal including stance, stride, and arm swing  Normal tandem gait  Romberg is absent      ROS:     Review of Systems   Constitutional: Negative  Negative for appetite change and fever  HENT: Negative  Negative for hearing loss, tinnitus, trouble swallowing and voice change  Eyes: Negative  Negative for photophobia, pain and visual disturbance  Respiratory: Negative  Negative for shortness of breath  Cardiovascular: Negative  Negative for palpitations  Gastrointestinal: Negative  Negative for nausea and vomiting  Endocrine: Negative  Negative for cold intolerance  Genitourinary: Negative  Negative for dysuria, frequency and urgency  Musculoskeletal: Negative  Negative for gait problem, myalgias and neck pain  Skin: Negative  Negative for rash  Allergic/Immunologic: Negative  Neurological: Negative  Negative for dizziness, tremors, seizures, syncope, facial asymmetry, speech difficulty, weakness, light-headedness, numbness and headaches  Hematological: Negative  Does not bruise/bleed easily  Psychiatric/Behavioral: Negative  Negative for confusion, hallucinations and sleep disturbance       Reviewed ROS as entered by medical assistant

## 2022-12-01 NOTE — ASSESSMENT & PLAN NOTE
Felicita Ballard is a 27year old female who presents to the office today to follow up on her MS and related concerns  She continues on Dimethyl Fumerate 240 mg bid started 8/2022 without any adverse effects, with the exception of flushing in the evening  She had stable radiographic findings 11/24/21  She denies any new focal neurologic complaints since she was last seen that are concerning for relapse  She denies any weakness, imbalance, dizziness, bowel/bladder change, or vision change/eye pain  She denies any recent illness or infection including URI or UTI  Due to her clinical and radiographic stability we will defer any additional MRI imaging at this time  We may consider repeating MRIs in summer 2023  She will continue on Dimethyl Fumerate (I did send a 90 day supply to minimize the risk of therapy disruption due to shipping delays) and I advised she may pre-medicate in the evening with tylenol and aspirin to assist with flushing  I have asked her to complete an updated CBC with differential to evaluate her absolute lymphocyte counts  She will continue on Gabapentin as needed instead of scheduled, as she no longer feels it is necessary to use every night as she has less pain  We discussed having her follow up in 4-5 months however she would prefer to follow up in 6 months after her wedding in May 2023  She will call with any new symptoms in the meantime      Plan:  - Continue Dimethyl Fumerate twice daily; can consider taking aspirin/tylenol before your second dose to assist with flushing  - Complete CBC with differential  - You may use Gabapentin 100-200 mg at bedtime as needed   - Follow up in 6 months; sooner if needed  - Call with any new symptoms  - Have a great wedding!!

## 2023-01-27 ENCOUNTER — OFFICE VISIT (OUTPATIENT)
Dept: OBGYN CLINIC | Facility: MEDICAL CENTER | Age: 33
End: 2023-01-27

## 2023-01-27 ENCOUNTER — TELEPHONE (OUTPATIENT)
Dept: NEUROLOGY | Facility: CLINIC | Age: 33
End: 2023-01-27

## 2023-01-27 VITALS
DIASTOLIC BLOOD PRESSURE: 80 MMHG | BODY MASS INDEX: 37.8 KG/M2 | HEIGHT: 64 IN | SYSTOLIC BLOOD PRESSURE: 120 MMHG | WEIGHT: 221.4 LBS

## 2023-01-27 DIAGNOSIS — Z01.419 ENCOUNTER FOR ANNUAL ROUTINE GYNECOLOGICAL EXAMINATION: Primary | ICD-10-CM

## 2023-01-27 DIAGNOSIS — Z30.41 SURVEILLANCE OF CONTRACEPTIVE PILL: ICD-10-CM

## 2023-01-27 DIAGNOSIS — E28.2 POLYCYSTIC OVARIAN SYNDROME: ICD-10-CM

## 2023-01-27 RX ORDER — IBUPROFEN 800 MG/1
800 TABLET ORAL EVERY 8 HOURS PRN
Qty: 60 TABLET | Refills: 6 | Status: SHIPPED | OUTPATIENT
Start: 2023-01-27

## 2023-01-27 NOTE — PROGRESS NOTES
Patient presents for a routine annual visit  Menarche- Y/O  Last Pap Smear- 9/28/21 -/-  LMP-  Birth control-  G P   Non smoker  Social drinker  Currently sexually active  No family history of uterine, ovarian, cervical or breast cancer     No concerns/questions for today's visit

## 2023-01-27 NOTE — PROGRESS NOTES
Subjective:        Flores Hernandez is a 27 y o  female  Here for Gynecologic Exam (Patient presents for a routine annual visit/Menarche- 12Y/O/Last Pap Smear- 19 -/-/LMP-23/Birth control-female partners/ /1 pack smoker/Non drinker/Currently sexually active/No family history of uterine, ovarian, cervical or breast cancer  /Pt stopped OCP 3-4 months ago has PCOS)      GYN HPI  Here for annual gyn exam  Menstrual cycle:  Menstrual patttern: period is OK now  occurring montthly lasting 4-7 days  hx of PCOS and previous menorrhagia  Vaginal c/o: denies  Urinary c/o: denies  Breast complaints:denies  She does do self breast Exams    Sexually active: yes- female partners  Contraception: none presently- has hx of PCOS- previously on OCP to manage menses  She reports she feels safe at home  The following portions of the patient's history were reviewed and updated as appropriate: allergies, current medications, past family history, past medical history, past social history, past surgical history, and problem list     Hereditary Cancer Screening  Cancer-related family history includes Cancer in her maternal grandmother  There is no history of Breast cancer, Colon cancer, Ovarian cancer, Uterine cancer, or Cervical cancer  Substance Abuse Screening Completed  See hx and flowsheet  Screens Positive for smoking 1 PPD  Marijuana- recreational now- used to have medical card  Too expensive       HEALTH MAINTENANCE SCREENINGS:    History of abnormal pap: No, Last Papanicolaou test:  Not on file    IMMUNIZATIONS  Gardasil HPV vaccine was not completed    Review of Systems          Objective:  /80 (BP Location: Left arm, Patient Position: Sitting, Cuff Size: Large)   Ht 5' 4" (1 626 m)   Wt 100 kg (221 lb 6 4 oz)   LMP 2023 (Exact Date)   BMI 38 00 kg/m²        Physical Exam  Constitutional:       General: She is not in acute distress  Appearance: Normal appearance     Genitourinary: Vulva and rectum normal       No lesions in the vagina  Right Labia: No rash or lesions  Left Labia: No lesions or rash  No vaginal discharge, erythema, tenderness or bleeding  Right Adnexa: not tender and no mass present  Left Adnexa: not tender and no mass present  No cervical motion tenderness, discharge or friability  Uterus is not enlarged or tender  No urethral prolapse present  Pelvic exam was performed with patient in the lithotomy position  Breasts:     Breasts are symmetrical       Right: No inverted nipple, mass, nipple discharge, skin change or tenderness  Left: No inverted nipple, mass, nipple discharge, skin change or tenderness  HENT:      Head: Normocephalic and atraumatic  Cardiovascular:      Rate and Rhythm: Normal rate  Heart sounds: No murmur heard  Pulmonary:      Effort: Pulmonary effort is normal       Breath sounds: Normal breath sounds  Abdominal:      General: There is no distension  Palpations: Abdomen is soft  Tenderness: There is no abdominal tenderness  Musculoskeletal:         General: Normal range of motion  Cervical back: Normal range of motion  Lymphadenopathy:      Cervical: No cervical adenopathy  Neurological:      Mental Status: She is alert and oriented to person, place, and time  Skin:     General: Skin is warm and dry  Psychiatric:         Mood and Affect: Mood normal          Behavior: Behavior normal    Vitals reviewed  Assessment/Plan:           [facility]  Annual GYN examination completed today  Risk prevention and anticipatory guidance provided including:  • Risk for hereditary cancers: Family history reviewed and patient does not qualify for referral for genetics consult/testing  Referral to genetics oncology offered as indicated  • Calcium and vitamin D supplementation     • Dietary and lifestyle recommendations based on her age and weight  body mass index is 38 kg/m²       • Tobacco and alcohol use, intervention ordered if applicable  Cervical cancer screening  Previous pap smears and ASCCP screening guidelines have been reviewed  Pap smear is not indicated at this time  Contraception   female partners    STI Screening  STI screening is declined  STI prevention discussed with use of condoms  Breast exam and breast cancer screening  Breast exam was done; , breast cancer imaging/screening is indicated at this time  Problem List Items Addressed This Visit     Polycystic ovarian syndrome    Relevant Medications    ibuprofen (MOTRIN) 800 mg tablet   Other Visit Diagnoses     Encounter for annual routine gynecological examination    -  Primary    Surveillance of contraceptive pill              No orders of the defined types were placed in this encounter

## 2023-01-28 DIAGNOSIS — E03.9 HYPOTHYROIDISM, UNSPECIFIED TYPE: ICD-10-CM

## 2023-01-30 RX ORDER — LEVOTHYROXINE SODIUM 0.05 MG/1
TABLET ORAL
Qty: 90 TABLET | Refills: 0 | Status: SHIPPED | OUTPATIENT
Start: 2023-01-30

## 2023-02-07 ENCOUNTER — RA CDI HCC (OUTPATIENT)
Dept: OTHER | Facility: HOSPITAL | Age: 33
End: 2023-02-07

## 2023-02-07 NOTE — PROGRESS NOTES
Asthmatic bronchitis with acute exacerbationNoted 10/21/2019  [J45 901]    Demyelinating disease of central nervous system, unspecified ([facility] ) [341  9  ICD-9-CM]    NOT on the BPA- please assess using MEAT for 2023 billing    [facility]  coding opportunities          Chart Reviewed number of suggestions sent to Provider: 3     Patients Insurance        Commercial Insurance: [employer]

## 2023-05-15 ENCOUNTER — TELEPHONE (OUTPATIENT)
Dept: NEUROLOGY | Facility: CLINIC | Age: 33
End: 2023-05-15

## 2023-05-18 ENCOUNTER — OFFICE VISIT (OUTPATIENT)
Dept: FAMILY MEDICINE CLINIC | Facility: CLINIC | Age: 33
End: 2023-05-18

## 2023-05-18 VITALS
HEART RATE: 79 BPM | OXYGEN SATURATION: 96 % | SYSTOLIC BLOOD PRESSURE: 142 MMHG | DIASTOLIC BLOOD PRESSURE: 84 MMHG | TEMPERATURE: 98.2 F | WEIGHT: 223 LBS | BODY MASS INDEX: 38.07 KG/M2 | HEIGHT: 64 IN

## 2023-05-18 DIAGNOSIS — E28.2 POLYCYSTIC OVARIAN SYNDROME: ICD-10-CM

## 2023-05-18 DIAGNOSIS — G43.709 CHRONIC MIGRAINE WITHOUT AURA WITHOUT STATUS MIGRAINOSUS, NOT INTRACTABLE: ICD-10-CM

## 2023-05-18 DIAGNOSIS — G35 MS (MULTIPLE SCLEROSIS) (HCC): Primary | ICD-10-CM

## 2023-05-18 DIAGNOSIS — E03.9 HYPOTHYROIDISM, UNSPECIFIED TYPE: ICD-10-CM

## 2023-05-18 RX ORDER — LEVOTHYROXINE SODIUM 0.05 MG/1
50 TABLET ORAL DAILY
Qty: 90 TABLET | Refills: 1 | Status: SHIPPED | OUTPATIENT
Start: 2023-05-18

## 2023-05-18 NOTE — ASSESSMENT & PLAN NOTE
Stable at this time she is followed by neurology she will continue with her same medication regimen follow-up with me in 6 months

## 2023-05-18 NOTE — PROGRESS NOTES
Assessment/Plan:       Problem List Items Addressed This Visit        Endocrine    Hypothyroidism     Hypothyroidism is stable continuing with levothyroxine follow-up with laboratory work in 6 months         Polycystic ovarian syndrome     Patient will decrease metformin down to 500 mg twice daily            Cardiovascular and Mediastinum    Chronic migraine without aura without status migrainosus, not intractable     Migraine stable no additional treatment plan or change at this time            Nervous and Auditory    MS (multiple sclerosis) ([facility] ) - Primary     Stable at this time she is followed by neurology she will continue with her same medication regimen follow-up with me in 6 months              Subjective:      Patient ID: Anthony Gomez is a 26 y o  female  Patient presents for general checkup and lab review and [facility] paperwork forms to be completed      The following portions of the patient's history were reviewed and updated as appropriate: allergies, current medications, past family history, past medical history, past social history, past surgical history and problem list     Review of Systems   Constitutional: Negative for chills, fatigue and fever  HENT: Negative for congestion, nosebleeds, rhinorrhea, sinus pressure and sore throat  Eyes: Negative for discharge and redness  Respiratory: Negative for cough and shortness of breath  Cardiovascular: Negative for chest pain, palpitations and leg swelling  Gastrointestinal: Negative for abdominal pain, blood in stool and nausea  Endocrine: Negative for cold intolerance, heat intolerance and polyuria  Genitourinary: Negative for dysuria and frequency  Musculoskeletal: Negative for arthralgias, back pain and myalgias  Skin: Negative for rash  Neurological: Negative for dizziness, weakness and headaches  Hematological: Negative for adenopathy  Psychiatric/Behavioral: Negative for behavioral problems and sleep disturbance   The patient "is not nervous/anxious  Objective:      /84   Pulse 79   Temp 98 2 °F (36 8 °C)   Ht 5' 4\" (1 626 m)   Wt 101 kg (223 lb)   SpO2 96%   BMI 38 28 kg/m²        Physical Exam  Vitals and nursing note reviewed  Constitutional:       General: She is not in acute distress  Appearance: Normal appearance  She is well-developed  She is obese  HENT:      Head: Normocephalic and atraumatic  Right Ear: Tympanic membrane, ear canal and external ear normal       Left Ear: Tympanic membrane, ear canal and external ear normal       Nose: Nose normal       Mouth/Throat:      Mouth: Mucous membranes are moist       Pharynx: Oropharynx is clear  No oropharyngeal exudate  Eyes:      General: No scleral icterus  Right eye: No discharge  Left eye: No discharge  Extraocular Movements: Extraocular movements intact  Conjunctiva/sclera: Conjunctivae normal       Pupils: Pupils are equal, round, and reactive to light  Neck:      Thyroid: No thyromegaly  Vascular: No JVD  Cardiovascular:      Rate and Rhythm: Normal rate and regular rhythm  Pulses: Normal pulses  Heart sounds: Normal heart sounds  No murmur heard  Pulmonary:      Effort: Pulmonary effort is normal       Breath sounds: No wheezing or rales  Chest:      Chest wall: No tenderness  Abdominal:      General: Bowel sounds are normal  There is no distension  Palpations: Abdomen is soft  There is no mass  Tenderness: There is no abdominal tenderness  Musculoskeletal:         General: No tenderness or deformity  Normal range of motion  Cervical back: Normal range of motion  Lymphadenopathy:      Cervical: No cervical adenopathy  Skin:     General: Skin is warm and dry  Capillary Refill: Capillary refill takes less than 2 seconds  Findings: No rash  Neurological:      General: No focal deficit present        Mental Status: She is alert and oriented to person, place, and " time  Mental status is at baseline  Cranial Nerves: No cranial nerve deficit  Coordination: Coordination normal       Deep Tendon Reflexes: Reflexes are normal and symmetric  Reflexes normal    Psychiatric:         Mood and Affect: Mood normal          Behavior: Behavior normal          Thought Content: Thought content normal          Judgment: Judgment normal           Data:    Laboratory Results: I have personally reviewed the pertinent laboratory results/reports   Radiology/Other Diagnostic Testing Results: I have personally reviewed pertinent reports         Lab Results   Component Value Date    WBC 15 28 (H) 04/01/2014    HGB 12 1 04/01/2014    HCT 35 6 04/01/2014    MCV 90 04/01/2014     04/01/2014     Lab Results   Component Value Date     (L) 03/16/2014    K 4 1 08/20/2022     (H) 08/20/2022    CO2 22 08/20/2022    ANIONGAP 10 03/16/2014    BUN 12 08/20/2022    CREATININE 1 00 08/20/2022    GLUCOSE 104 03/16/2014    GLUF 103 (H) 08/20/2022    CALCIUM 8 8 08/20/2022    CORRECTEDCA 9 5 08/20/2022    AST 13 08/20/2022    ALT 20 08/20/2022    ALKPHOS 82 08/20/2022    EGFR 73 08/20/2022     Lab Results   Component Value Date    CHOLESTEROL 180 08/20/2022     Lab Results   Component Value Date    HDL 42 (L) 08/20/2022     Lab Results   Component Value Date    LDLCALC 100 08/20/2022     Lab Results   Component Value Date    TRIG 192 (H) 08/20/2022     No results found for: [place], [place]  Lab Results   Component Value Date    IAT5GGYJUOLD 1 700 08/20/2022     Lab Results   Component Value Date    HGBA1C 5 8 (H) 08/20/2022     No results found for: CHARLINE    [facility] CenterLinda, DO

## 2023-05-24 ENCOUNTER — TELEPHONE (OUTPATIENT)
Dept: NEUROLOGY | Facility: CLINIC | Age: 33
End: 2023-05-24

## 2023-06-02 NOTE — TELEPHONE ENCOUNTER
PA approved from 5/31/2023 through 5/31/2024  Called pt and informed her of same  [facility]

## 2023-08-23 ENCOUNTER — OFFICE VISIT (OUTPATIENT)
Dept: FAMILY MEDICINE CLINIC | Facility: CLINIC | Age: 33
End: 2023-08-23
Payer: COMMERCIAL

## 2023-08-23 VITALS
WEIGHT: 227.6 LBS | HEIGHT: 64 IN | TEMPERATURE: 97.7 F | RESPIRATION RATE: 18 BRPM | HEART RATE: 76 BPM | OXYGEN SATURATION: 98 % | DIASTOLIC BLOOD PRESSURE: 86 MMHG | BODY MASS INDEX: 38.86 KG/M2 | SYSTOLIC BLOOD PRESSURE: 140 MMHG

## 2023-08-23 DIAGNOSIS — K21.9 GASTROESOPHAGEAL REFLUX DISEASE WITHOUT ESOPHAGITIS: ICD-10-CM

## 2023-08-23 DIAGNOSIS — E28.2 POLYCYSTIC OVARIAN SYNDROME: ICD-10-CM

## 2023-08-23 DIAGNOSIS — L02.32 BOIL OF BUTTOCK: Primary | ICD-10-CM

## 2023-08-23 PROCEDURE — 99213 OFFICE O/P EST LOW 20 MIN: CPT | Performed by: FAMILY MEDICINE

## 2023-08-23 PROCEDURE — 46050 I&D PERIANAL ABSCESS SUPFC: CPT | Performed by: FAMILY MEDICINE

## 2023-08-23 RX ORDER — SULFAMETHOXAZOLE AND TRIMETHOPRIM 800; 160 MG/1; MG/1
1 TABLET ORAL EVERY 12 HOURS SCHEDULED
Qty: 14 TABLET | Refills: 1 | Status: SHIPPED | OUTPATIENT
Start: 2023-08-23 | End: 2023-08-30

## 2023-08-23 RX ORDER — SULFAMETHOXAZOLE AND TRIMETHOPRIM 800; 160 MG/1; MG/1
TABLET ORAL 2 TIMES DAILY
COMMUNITY
Start: 2023-08-17 | End: 2023-08-24

## 2023-08-23 NOTE — ASSESSMENT & PLAN NOTE
Patient will continue with metformin monitor blood glucose if any change fever temperature or increasing pain at incision site from drained boil patient will notify me

## 2023-08-23 NOTE — PROGRESS NOTES
Assessment/Plan:       Problem List Items Addressed This Visit        Digestive    Gastroesophageal reflux disease without esophagitis     Continue with Pepcid H2 blocker in light of antibiotics contact office if any change or worsening GI upset            Endocrine    Polycystic ovarian syndrome     Patient will continue with metformin monitor blood glucose if any change fever temperature or increasing pain at incision site from drained boil patient will notify me            Musculoskeletal and Integument    Boil of buttock - Primary     Infected cyst buttocks left side incised and drained after lidocaine 1% local infusion deloculated subcutaneous with expression of purulent material patient did well gauze dressing applied and antibiotics will be given post procedure for 1 week follow-up if not improved         Relevant Medications    sulfamethoxazole-trimethoprim (BACTRIM DS) 800-160 mg per tablet    Other Relevant Orders    Incision and Drainage         Subjective:      Patient ID: Dipika Multani is a 26 y.o. female. Patient presents today for an infected cyst on her buttocks seen by urgent cares shortly for this and was placed on antibiotics but this has not resolved she did have some drainage from this initially and tried to express this on her own at home. She came back now because it remains painful and swollen      The following portions of the patient's history were reviewed and updated as appropriate: allergies, current medications, past family history, past medical history, past social history, past surgical history and problem list.    Review of Systems   Constitutional: Negative for chills, fatigue and fever. HENT: Negative for congestion, nosebleeds, rhinorrhea, sinus pressure and sore throat. Eyes: Negative for discharge and redness. Respiratory: Negative for cough and shortness of breath. Cardiovascular: Negative for chest pain, palpitations and leg swelling.    Gastrointestinal: Negative for abdominal pain, blood in stool and nausea. Endocrine: Negative for cold intolerance, heat intolerance and polyuria. Genitourinary: Negative for dysuria and frequency. Musculoskeletal: Negative for arthralgias, back pain and myalgias. Skin: Negative for rash. Neurological: Negative for dizziness, weakness and headaches. Hematological: Negative for adenopathy. Psychiatric/Behavioral: Negative for behavioral problems and sleep disturbance. The patient is not nervous/anxious. Objective:      /86 (BP Location: Left arm, Patient Position: Sitting, Cuff Size: Standard)   Pulse 76   Temp 97.7 °F (36.5 °C) (Tympanic)   Resp 18   Ht 5' 4" (1.626 m)   Wt 103 kg (227 lb 9.6 oz)   SpO2 98%   BMI 39.07 kg/m²          Physical Exam  Vitals and nursing note reviewed. Constitutional:       General: She is not in acute distress. Appearance: She is well-developed. HENT:      Head: Normocephalic and atraumatic. Right Ear: External ear normal.      Left Ear: External ear normal.      Nose: Nose normal.      Mouth/Throat:      Pharynx: No oropharyngeal exudate. Eyes:      General: No scleral icterus. Right eye: No discharge. Left eye: No discharge. Conjunctiva/sclera: Conjunctivae normal.      Pupils: Pupils are equal, round, and reactive to light. Neck:      Thyroid: No thyromegaly. Vascular: No JVD. Cardiovascular:      Rate and Rhythm: Normal rate and regular rhythm. Heart sounds: Normal heart sounds. No murmur heard. Pulmonary:      Effort: Pulmonary effort is normal.      Breath sounds: No wheezing or rales. Chest:      Chest wall: No tenderness. Abdominal:      General: Bowel sounds are normal. There is no distension. Palpations: Abdomen is soft. There is no mass. Tenderness: There is no abdominal tenderness. Musculoskeletal:         General: No tenderness or deformity. Normal range of motion.       Cervical back: Normal range of motion. Lymphadenopathy:      Cervical: No cervical adenopathy. Skin:     General: Skin is warm and dry. Findings: No rash. Neurological:      Mental Status: She is alert and oriented to person, place, and time. Cranial Nerves: No cranial nerve deficit. Coordination: Coordination normal.      Deep Tendon Reflexes: Reflexes are normal and symmetric. Reflexes normal.   Psychiatric:         Behavior: Behavior normal.         Thought Content: Thought content normal.         Judgment: Judgment normal.        Incision and Drainage    Date/Time: 8/23/2023 3:40 PM    Performed by: Penny Salguero DO  Authorized by: Penny Salguero DO  Universal Protocol:  Consent: Verbal consent obtained. Consent given by: patient  Patient understanding: patient states understanding of the procedure being performed  Patient identity confirmed: verbally with patient      Patient location:  Clinic  Location:     Type:  Abscess    Location:  Anogenital    Anogenital location:  Perianal  Pre-procedure details:     Skin preparation:  Antiseptic wash  Anesthesia (see MAR for exact dosages): Anesthesia method:  Local infiltration    Local anesthetic:  Lidocaine 1% w/o epi  Procedure details:     Complexity:  Simple    Needle aspiration: no      Incision types:  Stab incision    Scalpel blade:  11    Approach:  Open    Incision depth:  Subcutaneous    Wound management:  Probed and deloculated    Drainage:  Purulent    Drainage amount: Moderate    Wound treatment:  Wound left open    Packing materials:  None  Post-procedure details:     Patient tolerance of procedure: Tolerated well, no immediate complications      Data:    Laboratory Results: I have personally reviewed the pertinent laboratory results/reports   Radiology/Other Diagnostic Testing Results: I have personally reviewed pertinent reports.        Lab Results   Component Value Date    WBC 15.28 (H) 04/01/2014    HGB 12.1 04/01/2014    HCT 35.6 04/01/2014 MCV 90 04/01/2014     04/01/2014     Lab Results   Component Value Date     (L) 03/16/2014    K 4.1 08/20/2022     (H) 08/20/2022    CO2 22 08/20/2022    ANIONGAP 10 03/16/2014    BUN 12 08/20/2022    CREATININE 1.00 08/20/2022    GLUCOSE 104 03/16/2014    GLUF 103 (H) 08/20/2022    CALCIUM 8.8 08/20/2022    CORRECTEDCA 9.5 08/20/2022    AST 13 08/20/2022    ALT 20 08/20/2022    ALKPHOS 82 08/20/2022    EGFR 73 08/20/2022     Lab Results   Component Value Date    CHOLESTEROL 180 08/20/2022     Lab Results   Component Value Date    HDL 42 (L) 08/20/2022     Lab Results   Component Value Date    LDLCALC 100 08/20/2022     Lab Results   Component Value Date    TRIG 192 (H) 08/20/2022     No results found for: "CHOLHDL"  Lab Results   Component Value Date    [id number] 1.700 08/20/2022     Lab Results   Component Value Date    HGBA1C 5.8 (H) 08/20/2022     No results found for: "PSA"    Ronni Alvarado, DO

## 2023-08-23 NOTE — PROGRESS NOTES
BMI Counseling: Body mass index is 39.07 kg/m². The BMI is above normal. Nutrition recommendations include reducing portion sizes, decreasing overall calorie intake, consuming healthier snacks and moderation in carbohydrate intake. Exercise recommendations include exercising 3-5 times per week.

## 2023-08-23 NOTE — ASSESSMENT & PLAN NOTE
Infected cyst buttocks left side incised and drained after lidocaine 1% local infusion deloculated subcutaneous with expression of purulent material patient did well gauze dressing applied and antibiotics will be given post procedure for 1 week follow-up if not improved

## 2023-08-23 NOTE — ASSESSMENT & PLAN NOTE
Continue with Pepcid H2 blocker in light of antibiotics contact office if any change or worsening GI upset

## 2023-08-28 ENCOUNTER — TELEPHONE (OUTPATIENT)
Dept: FAMILY MEDICINE CLINIC | Facility: CLINIC | Age: 33
End: 2023-08-28

## 2023-08-28 DIAGNOSIS — L05.91 CYST NEAR COCCYX: Primary | ICD-10-CM

## 2023-09-08 NOTE — PROGRESS NOTES
Assessment/Plan:    Boil of buttock  Patient presents with a draining boil on her left buttock treated with both antibiotics and drainage procedure on August 23 for general surgery consultation. On physical examination she is pleasant well-nourished well-developed female. Pleasant competent reliable as a historian. The patient has a 2 cm area of erythema induration and draining wound for which incision and drainage is now indicated for wider control and debridement of the chronic inflammatory soft tissue process. The options benefits risks alternatives to the procedure reviewed with the patient and informed verbal consent obtained to proceed. Subjective:      Patient ID: Burke Lewis is a 26 y.o. female. Patient is here for a draining cyst on her left buttocks that she has been dealing with for over a month. Patient states it started off as a pimple but over some time due to picking at it, it grew larger in size and got infected. Dr. aPrks Lynda it on 08/23/23 and prescribed bactrim. Patient denies pain, foul smell or fevers/chills at this time. Theo MCMULLEN MA      The following portions of the patient's history were reviewed and updated as appropriate: allergies, current medications, past family history, past medical history, past social history, past surgical history and problem list.    Review of Systems   Constitutional: Negative for chills and fever. HENT: Negative for ear pain and sore throat. Eyes: Negative for pain and visual disturbance. Respiratory: Negative for cough and shortness of breath. Cardiovascular: Negative for chest pain and palpitations. Gastrointestinal: Negative for abdominal pain and vomiting. Genitourinary: Negative for dysuria and hematuria. Musculoskeletal: Negative for arthralgias and back pain. Skin: Negative for color change and rash. Neurological: Negative for seizures and syncope. All other systems reviewed and are negative. Objective:      /70 (BP Location: Left arm, Patient Position: Sitting, Cuff Size: Large)   Pulse 85   Temp (!) 97.2 °F (36.2 °C) (Temporal)   Resp 18   Ht 5' 4" (1.626 m)   Wt 102 kg (225 lb)   SpO2 99%   BMI 38.62 kg/m²            Physical Exam  Constitutional:       Appearance: She is well-developed. HENT:      Head: Normocephalic and atraumatic. Eyes:      Conjunctiva/sclera: Conjunctivae normal.      Pupils: Pupils are equal, round, and reactive to light. Cardiovascular:      Rate and Rhythm: Normal rate and regular rhythm. Pulmonary:      Effort: Pulmonary effort is normal.      Breath sounds: Normal breath sounds. Abdominal:      General: Bowel sounds are normal.      Palpations: Abdomen is soft. Musculoskeletal:         General: Normal range of motion. Cervical back: Normal range of motion and neck supple. Skin:     General: Skin is warm and dry. Comments: 2 cm inflamed epidermoid cyst of the left buttock   Neurological:      Mental Status: She is alert and oriented to person, place, and time. Psychiatric:         Behavior: Behavior normal.         Thought Content: Thought content normal.         Judgment: Judgment normal.       Incision and Drainage    Date/Time: 9/11/2023 8:00 AM    Performed by: Sharron Rodney MD  Authorized by: Sharron Rodney MD  Universal Protocol:  Consent: Verbal consent obtained. Risks and benefits: risks, benefits and alternatives were discussed  Consent given by: patient  Time out: Immediately prior to procedure a "time out" was called to verify the correct patient, procedure, equipment, support staff and site/side marked as required.   Timeout called at: 9/11/2023 9:27 AM.  Patient understanding: patient states understanding of the procedure being performed  Patient consent: the patient's understanding of the procedure matches consent given  Site marked: the operative site was marked  Patient identity confirmed: verbally with patient      Patient location:  Clinic  Location:     Type:  Abscess    Location: Left buttock. Pre-procedure details:     Skin preparation:  Betadine  Anesthesia (see MAR for exact dosages): Anesthesia method:  Local infiltration    Local anesthetic: 0.75% bupivacaine. Procedure details:     Complexity:  Intermediate    Incision types:  Single straight    Scalpel blade:  15    Approach:  Open    Incision depth:  Subcutaneous    Wound management:  Probed and deloculated and debrided    Drainage:  Bloody    Drainage amount:  Scant    Wound treatment:  Packing placed    Packing material: 4 x 4 gauze. Post-procedure details:     Patient tolerance of procedure:   Tolerated well, no immediate complications

## 2023-09-11 ENCOUNTER — CONSULT (OUTPATIENT)
Dept: SURGERY | Facility: CLINIC | Age: 33
End: 2023-09-11
Payer: COMMERCIAL

## 2023-09-11 VITALS
TEMPERATURE: 97.2 F | RESPIRATION RATE: 18 BRPM | HEART RATE: 85 BPM | DIASTOLIC BLOOD PRESSURE: 70 MMHG | BODY MASS INDEX: 38.41 KG/M2 | WEIGHT: 225 LBS | OXYGEN SATURATION: 99 % | SYSTOLIC BLOOD PRESSURE: 126 MMHG | HEIGHT: 64 IN

## 2023-09-11 DIAGNOSIS — L05.91 CYST NEAR COCCYX: ICD-10-CM

## 2023-09-11 DIAGNOSIS — L02.32 BOIL OF BUTTOCK: Primary | ICD-10-CM

## 2023-09-11 PROCEDURE — 99243 OFF/OP CNSLTJ NEW/EST LOW 30: CPT | Performed by: SURGERY

## 2023-09-11 PROCEDURE — 10060 I&D ABSCESS SIMPLE/SINGLE: CPT | Performed by: SURGERY

## 2023-09-11 NOTE — ASSESSMENT & PLAN NOTE
Patient presents with a draining boil on her left buttock treated with both antibiotics and drainage procedure on August 23 for general surgery consultation. On physical examination she is pleasant well-nourished well-developed female. Pleasant competent reliable as a historian. The patient has a 2 cm area of erythema induration and draining wound for which incision and drainage is now indicated for wider control and debridement of the chronic inflammatory soft tissue process. The options benefits risks alternatives to the procedure reviewed with the patient and informed verbal consent obtained to proceed.

## 2023-09-16 DIAGNOSIS — B35.3 TINEA PEDIS OF BOTH FEET: ICD-10-CM

## 2023-09-20 ENCOUNTER — TELEPHONE (OUTPATIENT)
Dept: SURGERY | Facility: CLINIC | Age: 33
End: 2023-09-20

## 2023-09-20 NOTE — TELEPHONE ENCOUNTER
Patient called to cancel her appointment for today. Patient states that her wound has closed and is healing nicely.   Stated that she will follow up with her PCP and did not want to reschedule do to her work schedule

## 2023-10-10 DIAGNOSIS — M54.50 CHRONIC BILATERAL LOW BACK PAIN WITHOUT SCIATICA: ICD-10-CM

## 2023-10-10 DIAGNOSIS — J45.21 MILD INTERMITTENT ASTHMATIC BRONCHITIS WITH ACUTE EXACERBATION: ICD-10-CM

## 2023-10-10 DIAGNOSIS — G89.29 CHRONIC BILATERAL LOW BACK PAIN WITHOUT SCIATICA: ICD-10-CM

## 2023-10-10 DIAGNOSIS — K21.9 GASTROESOPHAGEAL REFLUX DISEASE WITHOUT ESOPHAGITIS: ICD-10-CM

## 2023-10-10 DIAGNOSIS — E03.9 HYPOTHYROIDISM, UNSPECIFIED TYPE: ICD-10-CM

## 2023-10-10 DIAGNOSIS — G35 MS (MULTIPLE SCLEROSIS) (HCC): ICD-10-CM

## 2023-10-10 RX ORDER — METFORMIN HYDROCHLORIDE 500 MG/1
1000 TABLET, EXTENDED RELEASE ORAL 2 TIMES DAILY WITH MEALS
Qty: 360 TABLET | Refills: 0 | Status: SHIPPED | OUTPATIENT
Start: 2023-10-10

## 2023-11-15 DIAGNOSIS — E03.9 HYPOTHYROIDISM, UNSPECIFIED TYPE: ICD-10-CM

## 2023-11-15 RX ORDER — LEVOTHYROXINE SODIUM 0.05 MG/1
50 TABLET ORAL DAILY
Qty: 90 TABLET | Refills: 0 | Status: SHIPPED | OUTPATIENT
Start: 2023-11-15

## 2023-12-02 ENCOUNTER — APPOINTMENT (OUTPATIENT)
Dept: LAB | Facility: MEDICAL CENTER | Age: 33
End: 2023-12-02
Payer: COMMERCIAL

## 2023-12-02 DIAGNOSIS — G37.9 CNS DEMYELINATING DISEASE (HCC): ICD-10-CM

## 2023-12-02 DIAGNOSIS — E28.2 POLYCYSTIC OVARIAN SYNDROME: ICD-10-CM

## 2023-12-02 DIAGNOSIS — G35 MS (MULTIPLE SCLEROSIS) (HCC): ICD-10-CM

## 2023-12-02 DIAGNOSIS — G43.709 CHRONIC MIGRAINE WITHOUT AURA WITHOUT STATUS MIGRAINOSUS, NOT INTRACTABLE: ICD-10-CM

## 2023-12-02 DIAGNOSIS — E03.9 HYPOTHYROIDISM, UNSPECIFIED TYPE: ICD-10-CM

## 2023-12-02 LAB
ALBUMIN SERPL BCP-MCNC: 4.4 G/DL (ref 3.5–5)
ALP SERPL-CCNC: 71 U/L (ref 34–104)
ALT SERPL W P-5'-P-CCNC: 22 U/L (ref 7–52)
ANION GAP SERPL CALCULATED.3IONS-SCNC: 10 MMOL/L
AST SERPL W P-5'-P-CCNC: 19 U/L (ref 13–39)
BASOPHILS # BLD AUTO: 0.07 THOUSANDS/ÂΜL (ref 0–0.1)
BASOPHILS NFR BLD AUTO: 1 % (ref 0–1)
BILIRUB SERPL-MCNC: 0.33 MG/DL (ref 0.2–1)
BUN SERPL-MCNC: 16 MG/DL (ref 5–25)
CALCIUM SERPL-MCNC: 9.3 MG/DL (ref 8.4–10.2)
CHLORIDE SERPL-SCNC: 104 MMOL/L (ref 96–108)
CHOLEST SERPL-MCNC: 218 MG/DL
CO2 SERPL-SCNC: 25 MMOL/L (ref 21–32)
CREAT SERPL-MCNC: 0.77 MG/DL (ref 0.6–1.3)
EOSINOPHIL # BLD AUTO: 0.25 THOUSAND/ÂΜL (ref 0–0.61)
EOSINOPHIL NFR BLD AUTO: 3 % (ref 0–6)
ERYTHROCYTE [DISTWIDTH] IN BLOOD BY AUTOMATED COUNT: 12.1 % (ref 11.6–15.1)
EST. AVERAGE GLUCOSE BLD GHB EST-MCNC: 120 MG/DL
GFR SERPL CREATININE-BSD FRML MDRD: 100 ML/MIN/1.73SQ M
GLUCOSE P FAST SERPL-MCNC: 83 MG/DL (ref 65–99)
HBA1C MFR BLD: 5.8 %
HCT VFR BLD AUTO: 45.7 % (ref 34.8–46.1)
HDLC SERPL-MCNC: 49 MG/DL
HGB BLD-MCNC: 15.3 G/DL (ref 11.5–15.4)
IMM GRANULOCYTES # BLD AUTO: 0.03 THOUSAND/UL (ref 0–0.2)
IMM GRANULOCYTES NFR BLD AUTO: 0 % (ref 0–2)
LDLC SERPL CALC-MCNC: 115 MG/DL (ref 0–100)
LYMPHOCYTES # BLD AUTO: 1.98 THOUSANDS/ÂΜL (ref 0.6–4.47)
LYMPHOCYTES NFR BLD AUTO: 23 % (ref 14–44)
MCH RBC QN AUTO: 29.4 PG (ref 26.8–34.3)
MCHC RBC AUTO-ENTMCNC: 33.5 G/DL (ref 31.4–37.4)
MCV RBC AUTO: 88 FL (ref 82–98)
MONOCYTES # BLD AUTO: 0.66 THOUSAND/ÂΜL (ref 0.17–1.22)
MONOCYTES NFR BLD AUTO: 8 % (ref 4–12)
NEUTROPHILS # BLD AUTO: 5.51 THOUSANDS/ÂΜL (ref 1.85–7.62)
NEUTS SEG NFR BLD AUTO: 65 % (ref 43–75)
NONHDLC SERPL-MCNC: 169 MG/DL
NRBC BLD AUTO-RTO: 0 /100 WBCS
PLATELET # BLD AUTO: 337 THOUSANDS/UL (ref 149–390)
PMV BLD AUTO: 10.4 FL (ref 8.9–12.7)
POTASSIUM SERPL-SCNC: 4.3 MMOL/L (ref 3.5–5.3)
PROT SERPL-MCNC: 7.4 G/DL (ref 6.4–8.4)
RBC # BLD AUTO: 5.21 MILLION/UL (ref 3.81–5.12)
SODIUM SERPL-SCNC: 139 MMOL/L (ref 135–147)
TRIGL SERPL-MCNC: 271 MG/DL
TSH SERPL DL<=0.05 MIU/L-ACNC: 2.4 UIU/ML (ref 0.45–4.5)
WBC # BLD AUTO: 8.5 THOUSAND/UL (ref 4.31–10.16)

## 2023-12-02 PROCEDURE — 85025 COMPLETE CBC W/AUTO DIFF WBC: CPT

## 2023-12-02 PROCEDURE — 36415 COLL VENOUS BLD VENIPUNCTURE: CPT

## 2023-12-02 PROCEDURE — 84443 ASSAY THYROID STIM HORMONE: CPT

## 2023-12-02 PROCEDURE — 80061 LIPID PANEL: CPT

## 2023-12-02 PROCEDURE — 80053 COMPREHEN METABOLIC PANEL: CPT

## 2023-12-02 PROCEDURE — 83036 HEMOGLOBIN GLYCOSYLATED A1C: CPT

## 2023-12-05 ENCOUNTER — OFFICE VISIT (OUTPATIENT)
Dept: FAMILY MEDICINE CLINIC | Facility: CLINIC | Age: 33
End: 2023-12-05
Payer: COMMERCIAL

## 2023-12-05 VITALS
HEART RATE: 75 BPM | WEIGHT: 241.6 LBS | HEIGHT: 64 IN | TEMPERATURE: 99.6 F | OXYGEN SATURATION: 98 % | DIASTOLIC BLOOD PRESSURE: 82 MMHG | SYSTOLIC BLOOD PRESSURE: 132 MMHG | RESPIRATION RATE: 18 BRPM | BODY MASS INDEX: 41.25 KG/M2

## 2023-12-05 DIAGNOSIS — M25.862 CYST OF LEFT KNEE JOINT: ICD-10-CM

## 2023-12-05 DIAGNOSIS — E28.2 POLYCYSTIC OVARIAN SYNDROME: ICD-10-CM

## 2023-12-05 DIAGNOSIS — R73.03 PREDIABETES: Primary | ICD-10-CM

## 2023-12-05 DIAGNOSIS — G35 MS (MULTIPLE SCLEROSIS) (HCC): ICD-10-CM

## 2023-12-05 DIAGNOSIS — E03.9 HYPOTHYROIDISM, UNSPECIFIED TYPE: ICD-10-CM

## 2023-12-05 PROCEDURE — 99214 OFFICE O/P EST MOD 30 MIN: CPT | Performed by: FAMILY MEDICINE

## 2023-12-05 RX ORDER — IBUPROFEN 800 MG/1
800 TABLET ORAL EVERY 8 HOURS PRN
Qty: 60 TABLET | Refills: 6 | Status: SHIPPED | OUTPATIENT
Start: 2023-12-05

## 2023-12-05 RX ORDER — LEVOTHYROXINE SODIUM 0.05 MG/1
50 TABLET ORAL DAILY
Qty: 90 TABLET | Refills: 1 | Status: SHIPPED | OUTPATIENT
Start: 2023-12-05

## 2023-12-05 RX ORDER — AZITHROMYCIN 250 MG/1
TABLET, FILM COATED ORAL
Qty: 6 TABLET | Refills: 1 | Status: SHIPPED | OUTPATIENT
Start: 2023-12-05 | End: 2023-12-10

## 2023-12-05 NOTE — PROGRESS NOTES
Assessment/Plan:       Problem List Items Addressed This Visit          Endocrine    Hypothyroidism     Refill Levothyroxine now follow up labs. Relevant Medications    levothyroxine 50 mcg tablet    semaglutide, 0.25 or 0.5 mg/dose, (Ozempic, 0.25 or 0.5 MG/DOSE,) 2 mg/3 mL injection pen    azithromycin (ZITHROMAX) 250 mg tablet    Other Relevant Orders    Comprehensive metabolic panel    Hemoglobin A1C    Lipid panel    TSH, 3rd generation with Free T4 reflex    Polycystic ovarian syndrome     Will continue with metformin monitor laboratory work including blood sugar and follow-up at next office visit         Relevant Medications    ibuprofen (MOTRIN) 800 mg tablet    semaglutide, 0.25 or 0.5 mg/dose, (Ozempic, 0.25 or 0.5 MG/DOSE,) 2 mg/3 mL injection pen    azithromycin (ZITHROMAX) 250 mg tablet    Other Relevant Orders    Comprehensive metabolic panel    Hemoglobin A1C    Lipid panel    TSH, 3rd generation with Free T4 reflex       Nervous and Auditory    MS (multiple sclerosis) (HCC)     Continue medications currently and follow-up with neurology         Relevant Medications    ibuprofen (MOTRIN) 800 mg tablet    azithromycin (ZITHROMAX) 250 mg tablet    Other Relevant Orders    Comprehensive metabolic panel    Hemoglobin A1C    Lipid panel    TSH, 3rd generation with Free T4 reflex       Other    Prediabetes - Primary     Prediabetes with A1c measured now and patient will continue with metformin.   I would like her to start Ozempic         Relevant Medications    semaglutide, 0.25 or 0.5 mg/dose, (Ozempic, 0.25 or 0.5 MG/DOSE,) 2 mg/3 mL injection pen    azithromycin (ZITHROMAX) 250 mg tablet    Other Relevant Orders    Comprehensive metabolic panel    Hemoglobin A1C    Lipid panel    TSH, 3rd generation with Free T4 reflex    Cyst of left knee joint     Referral for orthopedic evaluation         Relevant Medications    azithromycin (ZITHROMAX) 250 mg tablet    Other Relevant Orders    Ambulatory Referral to Orthopedic Surgery         Subjective:      Patient ID: Bryanna Will is a 26 y.o. female. Sinus congestion and checkup knee pain        The following portions of the patient's history were reviewed and updated as appropriate: allergies, current medications, past family history, past medical history, past social history, past surgical history and problem list.    Review of Systems   Constitutional:  Negative for chills, fatigue and fever. HENT:  Positive for congestion. Negative for nosebleeds, rhinorrhea, sinus pressure and sore throat. Eyes:  Negative for discharge and redness. Respiratory:  Negative for cough and shortness of breath. Cardiovascular:  Negative for chest pain, palpitations and leg swelling. Gastrointestinal:  Negative for abdominal pain, blood in stool and nausea. Endocrine: Negative for cold intolerance, heat intolerance and polyuria. Genitourinary:  Negative for dysuria and frequency. Musculoskeletal:  Positive for arthralgias. Negative for back pain and myalgias. Skin:  Negative for rash. Neurological:  Negative for dizziness, weakness and headaches. Hematological:  Negative for adenopathy. Psychiatric/Behavioral:  Negative for behavioral problems and sleep disturbance. The patient is not nervous/anxious. Objective:      /82 (BP Location: Left arm, Patient Position: Sitting, Cuff Size: Large)   Pulse 75   Temp 99.6 °F (37.6 °C) (Tympanic)   Resp 18   Ht 5' 4" (1.626 m)   Wt 110 kg (241 lb 9.6 oz) Comment: fully clothed in boots/steel tips  SpO2 98%   BMI 41.47 kg/m²        Physical Exam  Vitals and nursing note reviewed. Constitutional:       General: She is not in acute distress. Appearance: She is well-developed. HENT:      Head: Normocephalic and atraumatic. Right Ear: External ear normal.      Left Ear: External ear normal.      Nose: Congestion present.       Mouth/Throat:      Mouth: Mucous membranes are moist. Pharynx: Oropharynx is clear. No oropharyngeal exudate. Eyes:      General: No scleral icterus. Right eye: No discharge. Left eye: No discharge. Conjunctiva/sclera: Conjunctivae normal.      Pupils: Pupils are equal, round, and reactive to light. Neck:      Thyroid: No thyromegaly. Vascular: No JVD. Cardiovascular:      Rate and Rhythm: Normal rate and regular rhythm. Heart sounds: Normal heart sounds. No murmur heard. Pulmonary:      Effort: Pulmonary effort is normal.      Breath sounds: No wheezing or rales. Chest:      Chest wall: No tenderness. Abdominal:      General: Bowel sounds are normal. There is no distension. Palpations: Abdomen is soft. There is no mass. Tenderness: There is no abdominal tenderness. Musculoskeletal:         General: No tenderness or deformity. Normal range of motion. Cervical back: Normal range of motion. Lymphadenopathy:      Cervical: No cervical adenopathy. Skin:     General: Skin is warm and dry. Findings: No rash. Neurological:      General: No focal deficit present. Mental Status: She is alert and oriented to person, place, and time. Cranial Nerves: No cranial nerve deficit. Coordination: Coordination normal.      Deep Tendon Reflexes: Reflexes are normal and symmetric. Reflexes normal.   Psychiatric:         Mood and Affect: Mood normal.         Behavior: Behavior normal.         Thought Content: Thought content normal.         Judgment: Judgment normal.          Data:    Laboratory Results: I have personally reviewed the pertinent laboratory results/reports   Radiology/Other Diagnostic Testing Results: I have personally reviewed pertinent reports.        Lab Results   Component Value Date    WBC 8.50 12/02/2023    HGB 15.3 12/02/2023    HCT 45.7 12/02/2023    MCV 88 12/02/2023     12/02/2023     Lab Results   Component Value Date     (L) 03/16/2014    K 4.3 12/02/2023     12/02/2023    CO2 25 12/02/2023    ANIONGAP 10 03/16/2014    BUN 16 12/02/2023    CREATININE 0.77 12/02/2023    GLUCOSE 104 03/16/2014    GLUF 83 12/02/2023    CALCIUM 9.3 12/02/2023    CORRECTEDCA 9.5 08/20/2022    AST 19 12/02/2023    ALT 22 12/02/2023    ALKPHOS 71 12/02/2023    EGFR 100 12/02/2023     Lab Results   Component Value Date    CHOLESTEROL 218 (H) 12/02/2023    CHOLESTEROL 180 08/20/2022     Lab Results   Component Value Date    HDL 49 (L) 12/02/2023    HDL 42 (L) 08/20/2022     Lab Results   Component Value Date    LDLCALC 115 (H) 12/02/2023    LDLCALC 100 08/20/2022     Lab Results   Component Value Date    TRIG 271 (H) 12/02/2023    TRIG 192 (H) 08/20/2022     No results found for: "[place], [place]"  Lab Results   Component Value Date    SHT5AVUGVYPE 2.395 12/02/2023     Lab Results   Component Value Date    HGBA1C 5.8 (H) 12/02/2023     No results found for: "PSA"    Karri Castaneda, DO

## 2023-12-05 NOTE — ASSESSMENT & PLAN NOTE
Prediabetes with A1c measured now and patient will continue with metformin.   I would like her to start Ozempic

## 2023-12-05 NOTE — ASSESSMENT & PLAN NOTE
Will continue with metformin monitor laboratory work including blood sugar and follow-up at next office visit

## 2023-12-14 ENCOUNTER — TELEPHONE (OUTPATIENT)
Dept: FAMILY MEDICINE CLINIC | Facility: CLINIC | Age: 33
End: 2023-12-14

## 2023-12-14 NOTE — TELEPHONE ENCOUNTER
Patient called in and let us know Ozempic was denied by her insurance. Patient must have Type two diabetes. Pre diabetes is not covered. Patient is going to reach out to insurance about weight loss medication being covered.

## 2024-02-27 ENCOUNTER — OFFICE VISIT (OUTPATIENT)
Dept: FAMILY MEDICINE CLINIC | Facility: CLINIC | Age: 34
End: 2024-02-27
Payer: COMMERCIAL

## 2024-02-27 VITALS
HEIGHT: 64 IN | DIASTOLIC BLOOD PRESSURE: 80 MMHG | HEART RATE: 74 BPM | OXYGEN SATURATION: 96 % | RESPIRATION RATE: 18 BRPM | BODY MASS INDEX: 39.95 KG/M2 | WEIGHT: 234 LBS | SYSTOLIC BLOOD PRESSURE: 120 MMHG | TEMPERATURE: 98.5 F

## 2024-02-27 DIAGNOSIS — G35 MS (MULTIPLE SCLEROSIS) (HCC): ICD-10-CM

## 2024-02-27 DIAGNOSIS — R73.03 PREDIABETES: ICD-10-CM

## 2024-02-27 DIAGNOSIS — Z00.00 ANNUAL PHYSICAL EXAM: Primary | ICD-10-CM

## 2024-02-27 DIAGNOSIS — E03.9 HYPOTHYROIDISM, UNSPECIFIED TYPE: ICD-10-CM

## 2024-02-27 DIAGNOSIS — E28.2 POLYCYSTIC OVARIAN SYNDROME: ICD-10-CM

## 2024-02-27 PROCEDURE — 99395 PREV VISIT EST AGE 18-39: CPT | Performed by: FAMILY MEDICINE

## 2024-02-27 NOTE — ASSESSMENT & PLAN NOTE
Patient is stable continuing with metformin repeat laboratory work continue all medications unchanged

## 2024-02-27 NOTE — ASSESSMENT & PLAN NOTE
Stable currently patient will need to follow-up with her neurologist after lab work completed continue with dimethyl fumarate

## 2024-02-27 NOTE — PROGRESS NOTES
ADULT ANNUAL PHYSICAL  [facility]    NAME: Heath Ballard  AGE: 33 y.o. SEX: female  : 1990     DATE: 2024     Assessment and Plan:     Problem List Items Addressed This Visit          Endocrine    Hypothyroidism     Follow-up TSH T4 in 6 months continue levothyroxine 50 mcg         Relevant Orders    Hemoglobin A1C    Lipid panel    Comprehensive metabolic panel    TSH, 3rd generation with Free T4 reflex    CBC and differential    Polycystic ovarian syndrome     Patient is stable continuing with metformin repeat laboratory work continue all medications unchanged         Relevant Orders    Hemoglobin A1C    Lipid panel    Comprehensive metabolic panel    TSH, 3rd generation with Free T4 reflex    CBC and differential       Nervous and Auditory    MS (multiple sclerosis) (HCC)     Stable currently patient will need to follow-up with her neurologist after lab work completed continue with dimethyl fumarate            Other    Annual physical exam - Primary    Relevant Orders    Hemoglobin A1C    Lipid panel    Comprehensive metabolic panel    TSH, 3rd generation with Free T4 reflex    CBC and differential    Prediabetes     Diabetes with last A1c 5.9 we will follow-up with laboratory work including A1c in 6 months patient will benefit from once weekly semaglutide or Mounjaro         Relevant Orders    Hemoglobin A1C    Lipid panel    Comprehensive metabolic panel    TSH, 3rd generation with Free T4 reflex    CBC and differential       Immunizations and preventive care screenings were discussed with patient today. Appropriate education was printed on patient's after visit summary.    Counseling:  Alcohol/drug use: discussed moderation in alcohol intake, the recommendations for healthy alcohol use, and avoidance of illicit drug use.  Dental Health: discussed importance of regular tooth brushing, flossing, and dental visits.  Injury prevention:  discussed safety/seat belts, safety helmets, smoke detectors, carbon dioxide detectors, and smoking near bedding or upholstery.  Sexual health: discussed sexually transmitted diseases, partner selection, use of condoms, avoidance of unintended pregnancy, and contraceptive alternatives.  Exercise: the importance of regular exercise/physical activity was discussed. Recommend exercise 3-5 times per week for at least 30 minutes.          Return in about 6 months (around 8/27/2024).     Chief Complaint:     Chief Complaint   Patient presents with    Follow-up    Physical Exam      History of Present Illness:     Adult Annual Physical   Patient here for a comprehensive physical exam. The patient reports no problems.    Diet and Physical Activity  Diet/Nutrition: well balanced diet.   Exercise: no formal exercise.      Depression Screening  PHQ-2/9 Depression Screening    Little interest or pleasure in doing things: 0 - not at all  Feeling down, depressed, or hopeless: 0 - not at all  PHQ-2 Score: 0  PHQ-2 Interpretation: Negative depression screen       General Health  Sleep: sleeps well.   Hearing: normal - bilateral.  Vision: no vision problems.   Dental: regular dental visits.       /GYN Health  Follows with gynecology?    Last menstrual period:   Contraceptive method: .  History of STDs?: .     Advanced Care Planning  Do you have an advanced directive?   Do you have a durable medical power of ?   ACP document given to the patient?       Review of Systems:     Review of Systems   Constitutional:  Negative for chills and fever.   HENT:  Negative for ear pain and sore throat.    Eyes:  Negative for pain and visual disturbance.   Respiratory:  Negative for cough and shortness of breath.    Cardiovascular:  Negative for chest pain and palpitations.   Gastrointestinal:  Negative for abdominal pain and vomiting.   Genitourinary:  Negative for dysuria and hematuria.   Musculoskeletal:  Negative for arthralgias and  back pain.   Skin:  Negative for color change and rash.   Neurological:  Negative for seizures and syncope.   All other systems reviewed and are negative.     Past Medical History:     Past Medical History:   Diagnosis Date    Migraine     MS (multiple sclerosis) (HCC)     PCOS (polycystic ovarian syndrome)     Thyroid disease       Past Surgical History:     Past Surgical History:   Procedure Laterality Date    IR SPINE PROCEDURE        Social History:     Social History     Socioeconomic History    Marital status: /Civil Union     Spouse name: None    Number of children: None    Years of education: None    Highest education level: None   Occupational History    None   Tobacco Use    Smoking status: Every Day     Current packs/day: 1.00     Average packs/day: 1 pack/day for 20.2 years (20.2 ttl pk-yrs)     Types: Cigarettes     Start date: 2004    Smokeless tobacco: Never   Vaping Use    Vaping status: Never Used   Substance and Sexual Activity    Alcohol use: Not Currently    Drug use: Yes     Types: Marijuana     Comment: 6 years clean    Sexual activity: Yes     Partners: Female   Other Topics Concern    None   Social History Narrative    None     Social Determinants of Health     Financial Resource Strain: Not on file   Food Insecurity: Not on file   Transportation Needs: Not on file   Physical Activity: Not on file   Stress: Not on file   Social Connections: Not on file   Intimate Partner Violence: Not on file   Housing Stability: Not on file      Family History:     Family History   Problem Relation Age of Onset    Diabetes Mother     Lupus Mother     Hypertension Mother     Cancer Maternal Grandmother     Heart failure Paternal Grandfather     Breast cancer Neg Hx     Colon cancer Neg Hx     Ovarian cancer Neg Hx     Uterine cancer Neg Hx     Cervical cancer Neg Hx       Current Medications:     Current Outpatient Medications   Medication Sig Dispense Refill    ciclopirox (LOPROX) 0.77 % cream Apply  "to affected area two times daily 90 g 0    fluticasone (FLONASE) 50 mcg/act nasal spray       ibuprofen (MOTRIN) 800 mg tablet Take 1 tablet (800 mg total) by mouth every 8 (eight) hours as needed for mild pain 60 tablet 6    ketorolac (TORADOL) 10 mg tablet Take 1 tablet at the onset of a migraine headache with sumatriptan 10 tablet 2    levothyroxine 50 mcg tablet Take 1 tablet (50 mcg total) by mouth daily 90 tablet 1    metFORMIN (GLUCOPHAGE-XR) 500 mg 24 hr tablet TAKE TWO TABLETS BY MOUTH TWICE DAILY WITH MEALS 360 tablet 0    Dimethyl Fumarate 240 MG CPDR Take 1 capsule (240 mg total) by mouth 2 (two) times a day (Patient not taking: Reported on 2/27/2024) 180 capsule 3    semaglutide, 0.25 or 0.5 mg/dose, (Ozempic, 0.25 or 0.5 MG/DOSE,) 2 mg/3 mL injection pen 0.25 mg under the skin every 7 days for 4 doses (28 days), THEN 0.5 mg under the skin every 7 days (Patient not taking: Reported on 2/27/2024) 9 mL 0     No current facility-administered medications for this visit.      Allergies:     No Known Allergies   Physical Exam:     /80 (BP Location: Left arm, Patient Position: Sitting, Cuff Size: Standard)   Pulse 74   Temp 98.5 °F (36.9 °C) (Tympanic)   Resp 18   Ht 5' 4\" (1.626 m)   Wt 106 kg (234 lb)   SpO2 96%   BMI 40.17 kg/m²     Physical Exam     Taiwo tSearns DO   [facility] [place]    "

## 2024-02-27 NOTE — ASSESSMENT & PLAN NOTE
Diabetes with last A1c 5.9 we will follow-up with laboratory work including A1c in 6 months patient will benefit from once weekly semaglutide or Mounjaro

## 2024-02-28 ENCOUNTER — TELEPHONE (OUTPATIENT)
Dept: FAMILY MEDICINE CLINIC | Facility: CLINIC | Age: 34
End: 2024-02-28

## 2024-02-29 DIAGNOSIS — R73.03 PREDIABETES: Primary | ICD-10-CM

## 2024-02-29 DIAGNOSIS — G35 MS (MULTIPLE SCLEROSIS) (HCC): ICD-10-CM

## 2024-02-29 DIAGNOSIS — E28.2 POLYCYSTIC OVARIAN SYNDROME: ICD-10-CM

## 2024-02-29 DIAGNOSIS — R73.03 PREDIABETES: ICD-10-CM

## 2024-04-12 DIAGNOSIS — G35 MS (MULTIPLE SCLEROSIS) (HCC): ICD-10-CM

## 2024-04-12 DIAGNOSIS — E28.2 POLYCYSTIC OVARIAN SYNDROME: ICD-10-CM

## 2024-04-12 DIAGNOSIS — R73.03 PREDIABETES: ICD-10-CM

## 2024-04-30 DIAGNOSIS — E28.2 POLYCYSTIC OVARIAN SYNDROME: ICD-10-CM

## 2024-04-30 DIAGNOSIS — R73.03 PREDIABETES: ICD-10-CM

## 2024-04-30 DIAGNOSIS — G35 MS (MULTIPLE SCLEROSIS) (HCC): ICD-10-CM

## 2024-04-30 NOTE — TELEPHONE ENCOUNTER
Reason for call:   [x] Refill   [] Prior Auth  [] Other:     Office:   [x] PCP/Provider -   [] Specialty/Provider -     Medication:   tirzepatide (Mounjaro) 2.5 MG/0.5ML - Inject 0.5 mL (2.5 mg total) under the skin every 7 days     Pharmacy:   [id number] - [place], [place] - [address] [facility]     Does the patient have enough for 3 days?   [] Yes   [x] No - Send as HP to POD

## 2024-05-01 ENCOUNTER — TELEPHONE (OUTPATIENT)
Dept: FAMILY MEDICINE CLINIC | Facility: CLINIC | Age: 34
End: 2024-05-01

## 2024-05-01 NOTE — TELEPHONE ENCOUNTER
[facility] started a prior authorization through Cover My Meds for Mounjaro 2.5 mg. The information is scanned into Media portion of the chart.

## 2024-05-06 NOTE — TELEPHONE ENCOUNTER
PA for tirzepatide (Mounjaro) 2.5 MG/0.5ML     Submitted via    [x]CMM-KEY [id number]  []Surescripts-Case ID #   []Faxed to plan   []Other website   []Phone call Case ID #     Office notes sent, clinical questions answered. Awaiting determination    Turnaround time for your insurance to make a decision on your Prior Authorization can take 7-21 business days.

## 2024-05-08 ENCOUNTER — TELEPHONE (OUTPATIENT)
Age: 34
End: 2024-05-08

## 2024-05-08 NOTE — TELEPHONE ENCOUNTER
Called to follow up on the prior auth of Mounjaro.  Prior auth has been started and Medication is still waiting approval

## 2024-05-09 ENCOUNTER — TELEPHONE (OUTPATIENT)
Age: 34
End: 2024-05-09

## 2024-05-09 NOTE — TELEPHONE ENCOUNTER
Patient called to ask if her appointment on 6/14 can be added to the cancellation list. Added appointment to the cancellation list.

## 2024-05-09 NOTE — TELEPHONE ENCOUNTER
PA for tirzepatide (Mounjaro) 2.5 MG/0.5ML  Denied    Reason:                  Message sent to provider pool Yes    Denial letter scanned into Media Yes    Appeal started No    (Provider will need to decide if appeal is warranted and send clinical documentation to PA team for initiation.)

## 2024-05-13 ENCOUNTER — TELEPHONE (OUTPATIENT)
Age: 34
End: 2024-05-13

## 2024-05-13 NOTE — TELEPHONE ENCOUNTER
Called and spoke with patient, patient does not want the medication. She said not to worry about it.

## 2024-05-13 NOTE — TELEPHONE ENCOUNTER
Pharmacy called in to report pts medication  (tirzepatide (Mounjaro) 2.5 MG/0.5ML  ) was denied on prior auth and Pharmacy wanted to know if PCP let patient know and what PCP was going to do next.  PCP please further advise patient.  Thanks

## 2024-06-03 DIAGNOSIS — E03.9 HYPOTHYROIDISM, UNSPECIFIED TYPE: ICD-10-CM

## 2024-06-04 RX ORDER — LEVOTHYROXINE SODIUM 0.05 MG/1
50 TABLET ORAL DAILY
Qty: 90 TABLET | Refills: 0 | Status: SHIPPED | OUTPATIENT
Start: 2024-06-04

## 2024-06-10 ENCOUNTER — TELEPHONE (OUTPATIENT)
Dept: BARIATRICS | Facility: CLINIC | Age: 34
End: 2024-06-10

## 2024-07-02 ENCOUNTER — OFFICE VISIT (OUTPATIENT)
Dept: BARIATRICS | Facility: CLINIC | Age: 34
End: 2024-07-02
Payer: COMMERCIAL

## 2024-07-02 VITALS
DIASTOLIC BLOOD PRESSURE: 80 MMHG | WEIGHT: 230.2 LBS | HEIGHT: 64 IN | SYSTOLIC BLOOD PRESSURE: 120 MMHG | HEART RATE: 87 BPM | BODY MASS INDEX: 39.3 KG/M2

## 2024-07-02 DIAGNOSIS — E66.01 CLASS 2 SEVERE OBESITY DUE TO EXCESS CALORIES WITH SERIOUS COMORBIDITY AND BODY MASS INDEX (BMI) OF 39.0 TO 39.9 IN ADULT (HCC): Primary | ICD-10-CM

## 2024-07-02 DIAGNOSIS — E28.2 POLYCYSTIC OVARIAN SYNDROME: ICD-10-CM

## 2024-07-02 DIAGNOSIS — R73.03 PREDIABETES: ICD-10-CM

## 2024-07-02 DIAGNOSIS — E03.9 ACQUIRED HYPOTHYROIDISM: ICD-10-CM

## 2024-07-02 DIAGNOSIS — G43.709 CHRONIC MIGRAINE WITHOUT AURA WITHOUT STATUS MIGRAINOSUS, NOT INTRACTABLE: ICD-10-CM

## 2024-07-02 PROBLEM — L02.32 BOIL OF BUTTOCK: Status: RESOLVED | Noted: 2023-08-23 | Resolved: 2024-07-02

## 2024-07-02 PROBLEM — E66.812 CLASS 2 SEVERE OBESITY DUE TO EXCESS CALORIES WITH SERIOUS COMORBIDITY AND BODY MASS INDEX (BMI) OF 39.0 TO 39.9 IN ADULT (HCC): Status: ACTIVE | Noted: 2024-07-02

## 2024-07-02 PROBLEM — L30.9 DERMATITIS: Status: RESOLVED | Noted: 2020-03-23 | Resolved: 2024-07-02

## 2024-07-02 PROBLEM — J45.901 ASTHMATIC BRONCHITIS WITH ACUTE EXACERBATION: Status: RESOLVED | Noted: 2019-10-21 | Resolved: 2024-07-02

## 2024-07-02 PROBLEM — M25.562 ACUTE PAIN OF LEFT KNEE: Status: RESOLVED | Noted: 2021-05-11 | Resolved: 2024-07-02

## 2024-07-02 PROCEDURE — 99204 OFFICE O/P NEW MOD 45 MIN: CPT | Performed by: NURSE PRACTITIONER

## 2024-07-02 RX ORDER — TIRZEPATIDE 2.5 MG/.5ML
2.5 INJECTION, SOLUTION SUBCUTANEOUS WEEKLY
Qty: 2 ML | Refills: 0 | Status: SHIPPED | OUTPATIENT
Start: 2024-07-02 | End: 2024-07-30

## 2024-07-02 NOTE — ASSESSMENT & PLAN NOTE
- Discussed options of HealthyCORE-Intensive Lifestyle Intervention Program, Very Low Calorie Diet-VLCD, and Conservative Program and the role of weight loss medications.  - Patient is interested in pursuing Conservative Program and follow up visits with medical weight management provider.  - Explained the importance of making lifestyle changes in addition to starting any anti-obesity medications.   - Initial weight loss goal of 5-10% weight loss for improved health. Weight loss can improve patient's co-morbid conditions and/or prevent weight-related complications.  - Weight is not at goal and patient has been unable to achieve a meaningful weight loss above 5% using various programs and tools for more than 6 months  - Labs reviewed from 12/2023    General Recommendations:  Nutrition:  Eat breakfast daily.  Do not skip meals.      Food log (ie.) www.myfitnesspal.com, sparkpeople.com, loseit.com, calorieking.com, etc.     Practice mindful eating.  Be sure to set aside time to eat, eat slowly, and savor your food.     Hydration:    At least 64oz of water daily.  No sugar sweetened beverages.  No juice (eat the fruit instead).     Exercise:  Studies have shown that the ideal exercise goal is somewhere between 150 to 300 minutes of moderate intensity exercise a week.  Start with exercising 10 minutes every other day and gradually increase physical activity with a goal of at least 150 minutes of moderate intensity exercise a week, divided over at least 3 days a week.  An example of this would be exercising 30 minutes a day, 5 days a week.  Resistance training can increase muscle mass and increase our resting metabolic rate.   FULL BODY resistance training is recommended 2-3 times a week.  Do not do this on consecutive days to allow for muscle recovery.     Aim for a bare minimum 5000 steps, even on days you do not exercise.     Monitoring:   Weigh yourself daily.  If this causes undue stress, then just weigh yourself  once a week.  Weigh yourself the same time of the day with the same amount of clothing on.  Preferably this should be done after waking up, before you eat, and with no clothing or minimal clothing on.     Specific Goals:  Calorie goal:  5643-6229 azul/day (Provided with meal plan to follow)   Patient lifestyle habits were reviewed and barriers to weight loss were addressed today.  Patient was encouraged to start tracking her foods daily and stay within the calorie range of 1500 to 1700 azul/day.  She will utilize the provided meal plan for meal ideas as well as portions.  Encouraged patient to more evenly distribute her calories throughout the day rather than consume them mainly in the evening.  Strongly encouraged patient to meet with the dietitian and she will consider in the future.  Activity level was discussed and she was advised to continue with daily walks but to also incorporate mild strength training 2 to 3 days/week.  Hydration was discussed and she was encouraged to drink at least 64 ounces of water daily and to continue to avoid sweetened beverages.    Medications were discussed:  Phentermine to be avoided per patient request  Topiramate was discussed to help with appetite suppression and patient denies any history of kidney stones  Bupropion/naltrexone was discussed to help with cravings but patient denies any cravings  GLP-1 medications were discussed patient would like to proceed with Zepbound.    Zepbound Instructions:    - Begin Zepbound 2.5 mg subcutaneously once a week. Dose changes may occur after 4 doses if medication is tolerated. You will be assessed prior to each dose change to make sure you are tolerating the medication well.  - Please message me when you have 2 pens left from the prescription so there are no lapses in treatment.  - If you have been off the medication for more than 14 days please contact the office as you will need to restart the titration at the starting dose again to avoid  significant side effects or adverse events.  - Visit Zepbound.com for further information/injection instructions.   -Please eat small frequent meals to help reduce nausea. Lemon water and saltine crackers may help with this.   - Side effects of Zepbound discussed: nausea, vomiting, diarrhea, and constipation. If you experience fever, nausea/vomiting, and pain radiating to your back this may be a sign of pancreatitis. Please go to the emergency room if this occurs.  - If on oral birth control a 2nd method of birth control is recommended during the 1st 8 weeks of therapy and for 4 weeks after any dosage change.   - Patient understands the side effects of the medication and proper administration. Patient agrees with the treatment plan and all questions were answered.  - Supply concerns were discussed with patient and patient voiced understanding that they will need to call with adequate time for refills to avoid any lapses in treatment.  Patient may also have to call around to different pharmacies to see if any pharmacy has the appropriate dose in stock.

## 2024-07-02 NOTE — PROGRESS NOTES
Assessment/Plan:    Class 2 severe obesity due to excess calories with serious comorbidity and body mass index (BMI) of 39.0 to 39.9 in adult (HCC)  - Discussed options of HealthyCORE-Intensive Lifestyle Intervention Program, Very Low Calorie Diet-VLCD, and Conservative Program and the role of weight loss medications.  - Patient is interested in pursuing Conservative Program and follow up visits with medical weight management provider.  - Explained the importance of making lifestyle changes in addition to starting any anti-obesity medications.   - Initial weight loss goal of 5-10% weight loss for improved health. Weight loss can improve patient's co-morbid conditions and/or prevent weight-related complications.  - Weight is not at goal and patient has been unable to achieve a meaningful weight loss above 5% using various programs and tools for more than 6 months  - Labs reviewed from 12/2023    General Recommendations:  Nutrition:  Eat breakfast daily.  Do not skip meals.      Food log (ie.) www.[employer].com, sparkpeople.com, loseit.com, calorieking.com, etc.     Practice mindful eating.  Be sure to set aside time to eat, eat slowly, and savor your food.     Hydration:    At least 64oz of water daily.  No sugar sweetened beverages.  No juice (eat the fruit instead).     Exercise:  Studies have shown that the ideal exercise goal is somewhere between 150 to 300 minutes of moderate intensity exercise a week.  Start with exercising 10 minutes every other day and gradually increase physical activity with a goal of at least 150 minutes of moderate intensity exercise a week, divided over at least 3 days a week.  An example of this would be exercising 30 minutes a day, 5 days a week.  Resistance training can increase muscle mass and increase our resting metabolic rate.   FULL BODY resistance training is recommended 2-3 times a week.  Do not do this on consecutive days to allow for muscle recovery.     Aim for a bare  minimum 5000 steps, even on days you do not exercise.     Monitoring:   Weigh yourself daily.  If this causes undue stress, then just weigh yourself once a week.  Weigh yourself the same time of the day with the same amount of clothing on.  Preferably this should be done after waking up, before you eat, and with no clothing or minimal clothing on.     Specific Goals:  Calorie goal:  5102-4532 azul/day (Provided with meal plan to follow)   Patient lifestyle habits were reviewed and barriers to weight loss were addressed today.  Patient was encouraged to start tracking her foods daily and stay within the calorie range of 1500 to 1700 azul/day.  She will utilize the provided meal plan for meal ideas as well as portions.  Encouraged patient to more evenly distribute her calories throughout the day rather than consume them mainly in the evening.  Strongly encouraged patient to meet with the dietitian and she will consider in the future.  Activity level was discussed and she was advised to continue with daily walks but to also incorporate mild strength training 2 to 3 days/week.  Hydration was discussed and she was encouraged to drink at least 64 ounces of water daily and to continue to avoid sweetened beverages.    Medications were discussed:  Phentermine to be avoided per patient request  Topiramate was discussed to help with appetite suppression and patient denies any history of kidney stones  Bupropion/naltrexone was discussed to help with cravings but patient denies any cravings  GLP-1 medications were discussed patient would like to proceed with Zepbound.    Zepbound Instructions:    - Begin Zepbound 2.5 mg subcutaneously once a week. Dose changes may occur after 4 doses if medication is tolerated. You will be assessed prior to each dose change to make sure you are tolerating the medication well.  - Please message me when you have 2 pens left from the prescription so there are no lapses in treatment.  - If you have  been off the medication for more than 14 days please contact the office as you will need to restart the titration at the starting dose again to avoid significant side effects or adverse events.  - Visit Zepbound.com for further information/injection instructions.   -Please eat small frequent meals to help reduce nausea. Lemon water and saltine crackers may help with this.   - Side effects of Zepbound discussed: nausea, vomiting, diarrhea, and constipation. If you experience fever, nausea/vomiting, and pain radiating to your back this may be a sign of pancreatitis. Please go to the emergency room if this occurs.  - If on oral birth control a 2nd method of birth control is recommended during the 1st 8 weeks of therapy and for 4 weeks after any dosage change.   - Patient understands the side effects of the medication and proper administration. Patient agrees with the treatment plan and all questions were answered.  - Supply concerns were discussed with patient and patient voiced understanding that they will need to call with adequate time for refills to avoid any lapses in treatment.  Patient may also have to call around to different pharmacies to see if any pharmacy has the appropriate dose in stock.    Prediabetes  Latest A1c was 5.8  Weight loss and diet changes will likely help with blood sugar control  Patient is currently on metformin 500 mg daily  GLP-1 medication will likely help with blood sugar control    Chronic migraine without aura without status migrainosus, not intractable  Patient has previously been on topiramate and denied any adverse reactions    Hypothyroidism  Treatment includes levothyroxine 50 mcg daily  Thyroid lab work stable         Heath was seen today for consult.    Diagnoses and all orders for this visit:    Class 2 severe obesity due to excess calories with serious comorbidity and body mass index (BMI) of 39.0 to 39.9 in adult (HCC)  -     tirzepatide (Zepbound) 2.5 mg/0.5 mL  auto-injector; Inject 0.5 mL (2.5 mg total) under the skin once a week for 28 days    Polycystic ovarian syndrome  -     tirzepatide (Zepbound) 2.5 mg/0.5 mL auto-injector; Inject 0.5 mL (2.5 mg total) under the skin once a week for 28 days    Prediabetes  -     tirzepatide (Zepbound) 2.5 mg/0.5 mL auto-injector; Inject 0.5 mL (2.5 mg total) under the skin once a week for 28 days    Chronic migraine without aura without status migrainosus, not intractable    Acquired hypothyroidism         Patient denies personal history of pancreatitis. Patient also denies personal and family history of medullary thyroid cancer and multiple endocrine neoplasia type 2 (MEN 2 tumor). Patient denies any history of kidney stones, seizures, or glaucoma.      Total time spent reviewing chart, interviewing patient, examining patient, discussing plan, answering all questions, and documentin min, with >50% face-to-face time spent counseling patient on nonsurgical interventions for the treatment of excess weight. Discussed in detail nonsurgical options including intensive lifestyle intervention program, very low-calorie diet program and conservative program.  Discussed the role of weight loss medications.  Counseled patient on diet behavior and exercise modification for weight loss.    Follow up in approximately 2 months with Non-Surgical Physician/Advanced Practitioner.    Subjective:   Chief Complaint   Patient presents with    Consult     Pt here today for MWM consult        Patient ID: Heath Ballard  is a 34 y.o. female with excess weight/obesity here to pursue weight management.  Previous notes and records have been reviewed.    Past Medical History:   Diagnosis Date    Acute pain of left knee 2021    Migraine     MS (multiple sclerosis) (HCC)     PCOS (polycystic ovarian syndrome)     Thyroid disease      Past Surgical History:   Procedure Laterality Date    COSMETIC SURGERY  2016    Calcaneous fracture    IR SPINE PROCEDURE          HPI:  Wt Readings from Last 20 Encounters:   07/02/24 104 kg (230 lb 3.2 oz)   02/27/24 106 kg (234 lb)   12/05/23 110 kg (241 lb 9.6 oz)   09/11/23 102 kg (225 lb)   08/23/23 103 kg (227 lb 9.6 oz)   05/18/23 101 kg (223 lb)   04/12/23 101 kg (222 lb)   01/27/23 100 kg (221 lb 6.4 oz)   12/01/22 103 kg (227 lb 11.2 oz)   08/30/22 106 kg (233 lb)   08/10/22 102 kg (223 lb 12.8 oz)   07/28/22 104 kg (230 lb)   04/08/22 109 kg (239 lb 6.4 oz)   12/08/21 105 kg (231 lb 6.4 oz)   11/09/21 105 kg (230 lb 6.4 oz)   05/11/21 96.6 kg (213 lb)   12/11/20 90.7 kg (200 lb)   10/02/20 90.7 kg (200 lb)   02/17/20 102 kg (225 lb 12.8 oz)   10/21/19 93 kg (205 lb)       Patient presents today to medical weight management office for consult.  Patient states she has always struggled with her weight and she has a lot of family obesity on both sides of her family.  She has tried many different programs and diets in the past with little success.  Her primary care provider attempted to get Ozempic and Mounjaro covered for her to get started for her weight loss journey but neither were covered by her insurance as she is not a diabetic.  Patient has been diagnosed with PCOS and is currently on metformin for the insulin resistance.  She has also struggled with migraines in the past and was on topiramate but does not recall any weight loss while on this medication.  Patient also has MS but is currently not on any pharmaceutical treatment.  Patient has tried to adjust her lifestyle within the past 2 months including reducing her sweetened beverage intake and incorporating more food throughout the day.  Patient does report that she often skips meals throughout the day and then will have some binge eating behavior in the evening.  She states this has also been improving in the past couple of months.  Patient would like to discuss an injectable medication as she feels like her insurance may cover Zepbound.  Patient does not want to go on  phentermine as her wife was on this in had significant side effects.      1769-4657 calories    Obesity/Excess Weight:  Severity: Moderate  Onset:  life long    Modifiers: Diet and Exercise and Commercial Weight Loss Programs-ie. Weight Watchers, Handup, Nutrisystem, etc.  Contributing factors: Poor Food Choices, Insufficient Physical Activity, Stress/Emotional Eating, Binge Eating, Lack of knowledge of appropriate lifestyle changes, and Insufficient time to make appropriate lifestyle changes  Associated symptoms: comorbid conditions, fatigue, increased joint pain, decreased exercise capacity, body image issues, decreased self esteem, increased shortness of breath, decreased mobility, depression, inability to do certain activities, and clothes do not fit    Diet recall:  B: yogurt with granola and fruit OR protein shake  L: skips OR leftovers OR hotpocket  D: protein and starch and vegetable  S: recently stopped    Hydration: water, coffee with sugar  Alcohol: no  Smoking: yes - 1ppd  Exercise: walking 2-4 miles per day  Occupation: works at a [employer]  Sleep: ok  STOP ban/8    Current weight: 230.2 lbs  Current BMI: 39.51  Current Waist Measurement: 45.75 in  Goal weight: 150-160 lbs        The following portions of the patient's history were reviewed and updated as appropriate: allergies, current medications, past family history, past medical history, past social history, past surgical history, and problem list.    Family History   Problem Relation Age of Onset    Diabetes Mother     Lupus Mother     Hypertension Mother     Cancer Maternal Grandmother     Heart failure Paternal Grandfather     Stroke Maternal Aunt     Stroke Maternal Aunt     Stroke Maternal Aunt     Breast cancer Neg Hx     Colon cancer Neg Hx     Ovarian cancer Neg Hx     Uterine cancer Neg Hx     Cervical cancer Neg Hx         Review of Systems   Constitutional:  Negative for fatigue.   HENT:  Negative for sore throat.    Respiratory:  " Negative for cough and shortness of breath.    Cardiovascular:  Negative for chest pain, palpitations and leg swelling.   Gastrointestinal:  Negative for abdominal pain, constipation, diarrhea and nausea.   Genitourinary:  Negative for dysuria.   Musculoskeletal:  Negative for arthralgias and back pain.   Skin:  Negative for rash.   Neurological:  Negative for headaches.   Psychiatric/Behavioral:  Negative for dysphoric mood. The patient is not nervous/anxious.        Objective:  /80 (BP Location: Left arm, Patient Position: Sitting, Cuff Size: Large)   Pulse 87   Ht 5' 4\" (1.626 m)   Wt 104 kg (230 lb 3.2 oz)   LMP 06/30/2024 (Exact Date)   BMI 39.51 kg/m²     Physical Exam  Vitals and nursing note reviewed.   Constitutional:       Appearance: Normal appearance. She is obese.   HENT:      Head: Normocephalic.   Pulmonary:      Effort: Pulmonary effort is normal.   Neurological:      General: No focal deficit present.      Mental Status: She is alert and oriented to person, place, and time.   Psychiatric:         Mood and Affect: Mood normal.         Behavior: Behavior normal.         Thought Content: Thought content normal.         Judgment: Judgment normal.              Labs and Imaging  Recent labs and imaging have been personally reviewed.  Lab Results   Component Value Date    WBC 8.50 12/02/2023    HGB 15.3 12/02/2023    HCT 45.7 12/02/2023    MCV 88 12/02/2023     12/02/2023     Lab Results   Component Value Date     (L) 03/16/2014    SODIUM 139 12/02/2023    K 4.3 12/02/2023     12/02/2023    CO2 25 12/02/2023    ANIONGAP 10 03/16/2014    AGAP 10 12/02/2023    BUN 16 12/02/2023    CREATININE 0.77 12/02/2023    GLUC 104 (H) 04/08/2022    GLUF 83 12/02/2023    CALCIUM 9.3 12/02/2023    AST 19 12/02/2023    ALT 22 12/02/2023    ALKPHOS 71 12/02/2023    TP 7.4 12/02/2023    TBILI 0.33 12/02/2023    EGFR 100 12/02/2023     Lab Results   Component Value Date    HGBA1C 5.8 (H) " 12/02/2023     Lab Results   Component Value Date    QHI0KARYXPQI 2.395 12/02/2023    TSH 0.74 12/11/2020     Lab Results   Component Value Date    CHOLESTEROL 218 (H) 12/02/2023     Lab Results   Component Value Date    HDL 49 (L) 12/02/2023     Lab Results   Component Value Date    TRIG 271 (H) 12/02/2023     Lab Results   Component Value Date    LDLCALC 115 (H) 12/02/2023

## 2024-07-02 NOTE — ASSESSMENT & PLAN NOTE
Latest A1c was 5.8  Weight loss and diet changes will likely help with blood sugar control  Patient is currently on metformin 500 mg daily  GLP-1 medication will likely help with blood sugar control

## 2024-07-05 ENCOUNTER — TELEPHONE (OUTPATIENT)
Dept: BARIATRICS | Facility: CLINIC | Age: 34
End: 2024-07-05

## 2024-07-08 NOTE — TELEPHONE ENCOUNTER
[employer] insurance rep - [email or web address], calling in to let provider know request for Zepbound 2.5 mg was denied due to insurance requesting pt need to work with RD (dieting/exercise) for at least 3 months before they will approve the request for zepbound.   Phone number to get copy of the denial paperwork is - [phone].  Call reference number- [id number].

## 2024-07-08 NOTE — TELEPHONE ENCOUNTER
Pt  calling regarding Zepbound, states insurance co requesting additional information from provider.  Advise that a prior auth was started and can take anywhere from 7 to 21 days for insurance to approve.  Pt asking if we can state that she has MS on the prior auth form.  Advise that form has specific questions, and can answer questions on form, and would provide this only if needed for form.  Pt verbalizes understanding.

## 2024-07-09 NOTE — TELEPHONE ENCOUNTER
Please let patient know that she is required to work will the medication weight management program for 3 months before they approve Zepbound. She should schedule a meal planning with the dietician to get a solid meal plan and then follow up with me at her next visit. If she shows effort then hopefully insurance will approve the medication in the future.

## 2024-07-10 ENCOUNTER — PATIENT MESSAGE (OUTPATIENT)
Dept: BARIATRICS | Facility: CLINIC | Age: 34
End: 2024-07-10

## 2024-07-10 ENCOUNTER — TELEPHONE (OUTPATIENT)
Age: 34
End: 2024-07-10

## 2024-07-10 NOTE — TELEPHONE ENCOUNTER
Patient of CINTIA Jean Baptiste. Stating she just missed a call from someone in the office regarding her Zepbound appeal. Spoke to Roma in the office who will call patient back regarding this.

## 2024-07-10 NOTE — TELEPHONE ENCOUNTER
I spoke with pt to let her know that I used those Dx codes for the original pa since they are attached to the Rx so I am not sure that it will make a difference with an appeal.

## 2024-07-10 NOTE — PATIENT COMMUNICATION
Pt calling back.  States she was disconnected when transferred to schedule visit with dietician.  RN sent teams msg but staff are unavailable at this time.  Pt requests a call back.    [phone]

## 2024-07-10 NOTE — TELEPHONE ENCOUNTER
Patient called in to ask if Deidra would be willing to write an appeal for her Zepbound due to her having MS and PCOS.  She was unaware that the RD visits would be self pay.  She would like to try the appeal first and if that doesn't work she will make a RD appointment.  Please call her and let her know.  Thanks.

## 2024-07-18 ENCOUNTER — CLINICAL SUPPORT (OUTPATIENT)
Dept: BARIATRICS | Facility: CLINIC | Age: 34
End: 2024-07-18

## 2024-07-18 VITALS — BODY MASS INDEX: 38.89 KG/M2 | HEIGHT: 64 IN | WEIGHT: 227.8 LBS

## 2024-07-18 DIAGNOSIS — R63.5 ABNORMAL WEIGHT GAIN: Primary | ICD-10-CM

## 2024-07-18 PROCEDURE — RECHECK

## 2024-07-18 PROCEDURE — WMDI30

## 2024-07-18 NOTE — PROGRESS NOTES
Weight Management Medical Nutrition Assessment  Heath was here today for meal planning.  Today she weighs 227.8 giving her a loss of 2.4 lbs in the last 2 weeks since seeing the provider VENUS Gay.  Per her insurance, she need to see an RD for 3 months before they will approve zepbound.  She has lost a total of 12.4 in the last 3 months - she lost 10 lbs before seeing the provider.  She has been journaling her food and practicing mindful eating.  She has cut back on sugars and simple carbs and has increased her fruits and vegetables. She used to make her coffee light and sweet and drink several cups a day.  Now she has one per day, adds in monk fruit and skim milk and only drinks water.  Provided her with a carb list, a lean protein handout, and some food journals.     Patient seen by Medical Provider in past 6 months:  yes  Requested to schedule appointment with Medical Provider: No      Anthropometric Measurements  Start Weight (#): 230.2   Current Weight (#): 227.8  TBW % Change from start weight:1%  Ideal Body Weight (#):120  Goal Weight (#): under 200 to start  Highest:240 ( 3 months ago)  Lowest: 150    Weight Loss History  Previous weight loss attempts: Counseling with  MD  Self Created Diets (Portion Control, Healthy Food Choices, etc.)    Food and Nutrition Related History    Food Recall  Breakfast:protein shake    Snack:none  Lunch:trukey, veg casseroel  Snack:none  Dinner:grits with shrimp  Snack:none      Beverages: coffee/tea and water   Volume of beverage intake: 60 +    Weekends: Same  Cravings: sweets/sugar  Trouble area of day:evening    Frequency of Eating out: irregularly  Food restrictions:none  Cooking: self   Food Shopping: self    Physical Activity Intake  Activity:Walking dog  Frequency:infrequently  Physical limitations/barriers to exercise: MS (sometimes has a flare up and swelling)    Estimated Needs    [employer] Energy Needs: BMR : 1665   1-2# loss weekly sedentary:  1050 - 1550              1-2# loss weekly lightly active:6769 - 5119  Maintenance calories for sedentary activity level: 2050  Protein:65 - 82      (1.2-1.5g/kg IBW)  Fluid: 64     (35mL/kg IBW)    Nutrition Diagnosis  Yes;    Overweight/obesity  related to Excess energy intake as evidenced by  BMI more than normative standard for age and sex (obesity-grade II 35-39.9)       Nutrition Intervention    Nutrition Prescription  Calories:1200 - 1500  Protein:65 - 82  Fluid:64    Nutrition Education:    Calorie controlled menu  Lean protein food choices  Healthy snack options  Food journaling tips      Nutrition Counseling:  Strategies: meal planning, portion sizes, healthy snack choices, hydration, fiber intake, protein intake, exercise, food journal      Monitoring and Evaluation:  Evaluation criteria:  Energy Intake  Meet protein needs  Maintain adequate hydration  Monitor weekly weight  Meal planning/preparation  Food journal   Decreased portions at mealtimes and snacks  Physical activity     Barriers to learning:none  Readiness to change: Action:  (Changing behavior)  Comprehension: good  Expected Compliance: good

## 2024-07-23 ENCOUNTER — TELEPHONE (OUTPATIENT)
Dept: NEUROLOGY | Facility: CLINIC | Age: 34
End: 2024-07-23

## 2024-08-02 ENCOUNTER — OFFICE VISIT (OUTPATIENT)
Dept: NEUROLOGY | Facility: CLINIC | Age: 34
End: 2024-08-02
Payer: COMMERCIAL

## 2024-08-02 VITALS
BODY MASS INDEX: 40.22 KG/M2 | TEMPERATURE: 98.5 F | OXYGEN SATURATION: 97 % | SYSTOLIC BLOOD PRESSURE: 118 MMHG | HEART RATE: 73 BPM | WEIGHT: 234.3 LBS | DIASTOLIC BLOOD PRESSURE: 78 MMHG

## 2024-08-02 DIAGNOSIS — G43.709 CHRONIC MIGRAINE WITHOUT AURA WITHOUT STATUS MIGRAINOSUS, NOT INTRACTABLE: ICD-10-CM

## 2024-08-02 DIAGNOSIS — G35 MS (MULTIPLE SCLEROSIS) (HCC): Primary | ICD-10-CM

## 2024-08-02 PROCEDURE — 99215 OFFICE O/P EST HI 40 MIN: CPT

## 2024-08-02 RX ORDER — KETOROLAC TROMETHAMINE 10 MG/1
TABLET, FILM COATED ORAL
Qty: 10 TABLET | Refills: 2 | Status: SHIPPED | OUTPATIENT
Start: 2024-08-02

## 2024-08-02 NOTE — PATIENT INSTRUCTIONS
- Complete MRI Brain MS wo  - Continue Ketorolac as needed for headache; refill provided  - Follow up in 3 months to review MRI and discuss possible treatment

## 2024-08-02 NOTE — LETTER
August 2, 2024     Patient: Heath Ballard  YOB: 1990  Date of Visit: 8/2/2024      To Whom it May Concern:    Heath Ballard is under my professional care. Heath was seen in my office on 8/2/2024. Heath has Multiple Sclerosis and her symptoms may be exacerbated by the heat. Therefore she should be allowed to take breaks to cool down as needed, work indoors if able, or have other accommodations to allow her to stay cool while working in the heat.      If you have any questions or concerns, please don't hesitate to call.         Sincerely,          CINTIA Roger

## 2024-08-02 NOTE — ASSESSMENT & PLAN NOTE
Reports control without preventative treatment at this time  Using Tylenol or Ketorolac as needed on average once a week  Refill of Ketorolac provided today

## 2024-08-02 NOTE — ASSESSMENT & PLAN NOTE
Heath Ballard is a 34 year old female who presents to the office today to follow up on her MS and related concerns after not being seen for 16 months. She self discontinued Dimethyl Fumerate 240 mg bid a little more than a year ago, reporting photosensitivity, rash and heat intolerance. Since that time she denies any acute or worsening symptoms concerning for MS relapse. She has not had updated imaging since 2021. We did agree to have her repeat MRI Brain with MS protocol now to assess for disease stability vs progression. Pending those results she will follow up and she will decide if she is willing to consider an alternative DMT. In the interim I have asked her to complete an updated CBC with differential as well as a CMP. She understands while she remains off of DMT she is at higher risk for MS relapse. She will follow up in 3 months; but was advised to call sooner with any new or acute worsening for any symptoms concerning for relapse.       Plan:  - Complete MRI Brain MS wo  - Continue Ketorolac as needed for headache; refill provided  - Follow up in 3 months to review MRI and discuss possible treatment

## 2024-08-02 NOTE — PROGRESS NOTES
Patient ID: Heath Ballard is a 34 y.o. female.    Assessment/Plan:    Chronic migraine without aura without status migrainosus, not intractable  Reports control without preventative treatment at this time  Using Tylenol or Ketorolac as needed on average once a week  Refill of Ketorolac provided today    MS (multiple sclerosis) (HCC)  Heath Ballard is a 34 year old female who presents to the office today to follow up on her MS and related concerns after not being seen for 16 months. She self discontinued Dimethyl Fumerate 240 mg bid a little more than a year ago, reporting photosensitivity, rash and heat intolerance. Since that time she denies any acute or worsening symptoms concerning for MS relapse. She has not had updated imaging since 2021. We did agree to have her repeat MRI Brain with MS protocol now to assess for disease stability vs progression. Pending those results she will follow up and she will decide if she is willing to consider an alternative DMT. In the interim I have asked her to complete an updated CBC with differential as well as a CMP. She understands while she remains off of DMT she is at higher risk for MS relapse. She will follow up in 3 months; but was advised to call sooner with any new or acute worsening for any symptoms concerning for relapse.       Plan:  - Complete MRI Brain MS wo  - Continue Ketorolac as needed for headache; refill provided  - Follow up in 3 months to review MRI and discuss possible treatment        Diagnoses and all orders for this visit:    MS (multiple sclerosis) (HCC)  -     MRI brain MS wo contrast; Future  -     ketorolac (TORADOL) 10 mg tablet; Take 1 tablet at the onset of a migraine headache  -     CBC and differential; Future  -     Comprehensive metabolic panel; Future    Chronic migraine without aura without status migrainosus, not intractable         I have spent a total time of 40 minutes in caring for this patient on the day of the visit/encounter including  Diagnostic results, Prognosis, Risks and benefits of tx options, Instructions for management, Patient and family education, Importance of tx compliance, Risk factor reductions, Impressions, Documenting in the medical record, Reviewing / ordering tests, medicine, procedures  , and Obtaining or reviewing history  .      Subjective:    LUZ Ballard is a 34 year old female who presents to the office today to follow up on her MS and related concerns. She was last seen 12/1/2022 and has since been lost to follow up.     Multiple Sclerosis  She was maintained on Copaxone (site reactions) and then Aubagio (diffuse body pain) in the past. She has been off of Aubagio since April 2021 without known disease progression. She started Dimethyl Fumerate (8/2022). Unfortunately, she was lost to follow up and did not complete surveillance labs therefore discontinued Dimethyl Fumerate about 1 year ago. She states she had side effects: photosensitivity rash, and worse heat intolerance which is why she opted to discontinue treatment. She states since that time she has been taking more of a holistic approach- seeing a dietician, eating better, more physical activity walking 3-4 miles a day. Despite being off of DMT she denies any new focal neurologic complaints since she was last seen that are concerning for relapse. She denies any weakness, numbness/tingling, imbalance, dizziness (only transient lasting 1-2 hours in the morning occasionally), bowel/bladder change, or vision change/eye pain. She denies any recent illness or infection including URI or UTI. She does note that she is intolerant of the heat. 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 She does have an inflamed lymph node in the back of her head x 1 month which seems to improve as the day progresses (worse first thing in the morning).     Muscle Pain/Spasms  Since she was last seen she discontinued Gabapentin. She is no  longer having generalized body aches. She does continue to use medical marijuana (smokes, vapes, edibles, topical oils/creams) at bedtime which she feels is beneficial.     Sleep disturbance   She has completed a follow up in lab sleep study as her prior home study was concerning for very mild ARINA with significant episodes of hypoxia. Her follow up study was negative for any significant obstructive sleep apnea.      Migraine headaches   Her migraine headaches occur on average 1 time a week, but are easily aborted with tylenol or ibuprofen. She has been off of Topiramate since her last visit. She does have Ketorolac as needed, but needs refills. In the past she also had sumatriptan, decadron and olanzapine prn but has not used these.     Review of most recent imaging/testing on file:     MRI Brain 11/24/21  Unchanged multiple white matter lesions in a distribution compatible with multiple sclerosis. No new or enhancing lesions.     MRI Cervical Spine 11/24/21  No evidence of demyelinating lesion in cervical spine.  No abnormal enhancement.     MRI Thoracic spine o 2/6/16  1. Mild focal cord signal abnormality at the T4 level without associated   enhancement. In the same location there is a dorsal indentation on the thoracic   cord. This most likely reflects an arachnoid which can be associated with cord   signal abnormality. A demyelinating plaque is considered less likely but cannot   be excluded in this patient with possible multiple sclerosis.   2. Mild to moderate midthoracic spondylosis mainly from T6-T7 through T8-T9. No   central spinal canal stenosis or foraminal narrowing.     Labs  She has not had a recent CBC with differential. Last completed 12/2/2023, at that time her absolute lymphocyte count was 1.98.     The following portions of the patient's history were reviewed and updated as appropriate: allergies, current medications, past family history, past medical history, past social history, past  surgical history, and problem list.    Objective:    Blood pressure 118/78, pulse 73, temperature 98.5 °F (36.9 °C), temperature source Temporal, weight 106 kg (234 lb 4.8 oz), SpO2 97%, not currently breastfeeding.    Neurological Exam    On neurological examination patient is alert, awake, oriented and in no distress. Speech is fluent without dysarthria or aphasia. Cranial nerves 2-12 were symmetrically intact bilaterally. No evidence of any focal weakness or sensory loss in the upper or lower extremities. Motor testing reveals 5/5 strength of the bilateral upper and lower extremities.There was no pronator drift.  No fasciculations present. No abnormal involuntary movements. Finger- to-nose reveals no tremor or ataxia and intact proprioceptive function, no dysmetria was noted. Rapid alternating movement normal. Sensation was intact to vibration, light touch, and temperature in bilateral upper and lower extremities. Deep tendon reflexes were 2+ and symmetric in the bilateral upper and lower extremities. She is able to rise easily without assistance from a seated position. Casual gait is normal including stance, stride, and arm swing. Normal tandem gait. Romberg is absent.      ROS:    Review of Systems   Constitutional:  Negative for appetite change, fatigue and fever.   HENT: Negative.  Negative for hearing loss, tinnitus, trouble swallowing and voice change.    Eyes: Negative.  Negative for photophobia, pain and visual disturbance.   Respiratory: Negative.  Negative for shortness of breath.    Cardiovascular: Negative.  Negative for palpitations.   Gastrointestinal: Negative.  Negative for nausea and vomiting.   Endocrine: Negative.  Negative for cold intolerance.   Genitourinary: Negative.  Negative for dysuria, frequency and urgency.   Musculoskeletal:  Negative for back pain, gait problem, myalgias, neck pain and neck stiffness.   Skin: Negative.  Negative for rash.   Allergic/Immunologic: Negative.     Neurological: Negative.  Negative for dizziness, tremors, seizures, syncope, facial asymmetry, speech difficulty, weakness, light-headedness, numbness and headaches.   Hematological: Negative.  Does not bruise/bleed easily.   Psychiatric/Behavioral: Negative.  Negative for confusion, hallucinations and sleep disturbance.    All other systems reviewed and are negative.    Reviewed ROS as entered by medical assistant.

## 2024-08-30 ENCOUNTER — CLINICAL SUPPORT (OUTPATIENT)
Dept: BARIATRICS | Facility: CLINIC | Age: 34
End: 2024-08-30

## 2024-08-30 VITALS — BODY MASS INDEX: 38.96 KG/M2 | HEIGHT: 64 IN | WEIGHT: 228.2 LBS

## 2024-08-30 DIAGNOSIS — R63.5 ABNORMAL WEIGHT GAIN: Primary | ICD-10-CM

## 2024-08-30 PROCEDURE — RECHECK

## 2024-08-30 PROCEDURE — DB3PK

## 2024-08-30 NOTE — PROGRESS NOTES
Weight Management Medical Nutrition Assessment  Heath was here today for 1/3 RD visits.  Today she weighs 226.2 lbs giving her a loss of 1.6  lbs since her last visits. (Please note she had heavy work boots on that she did not take off and asked that I take 4 lbs off the number - I unfortunately I am not able to do that.)  Per her insurance, she needs to see an RD for 3 months before they will approve zepbound.  She reports that she has been walking more. Also making healthier food choices - eating more salads from garden vegetables that have been given to her by neighbors etc. She is not food logging - recommend that for accountability.  She has a follow-up visit with the provider next week.      Patient seen by Medical Provider in past 6 months:  yes  Requested to schedule appointment with Medical Provider: No        Anthropometric Measurements  Start Weight (#): 230.2   Current Weight (#): 226.2  TBW % Change from start weight:2%  Ideal Body Weight (#):120  Goal Weight (#): under 200 to start  Highest:240 ( 3 months ago)  Lowest: 150     Weight Loss History  Previous weight loss attempts: Counseling with  MD  Self Created Diets (Portion Control, Healthy Food Choices, etc.)     Food and Nutrition Related History     Food Recall  Breakfast:protein shake                        Snack:none  Lunch:trukey, veg casseroel  Snack:none  Dinner:grits with shrimp  Snack:none        Beverages: coffee/tea and water   Volume of beverage intake: 60 +     Weekends: Same  Cravings: sweets/sugar  Trouble area of day:evening     Frequency of Eating out: irregularly  Food restrictions:none  Cooking: self   Food Shopping: self     Physical Activity Intake  Activity:Walking dog  Frequency:infrequently  Physical limitations/barriers to exercise: MS (sometimes has a flare up and swelling)     Estimated Needs     [employer] Energy Needs: BMR : 1665   1-2# loss weekly sedentary:  1050 - 1550             1-2# loss weekly lightly  active:1561 - 9921  Maintenance calories for sedentary activity level: 2050  Protein:65 - 82      (1.2-1.5g/kg IBW)  Fluid: 64     (35mL/kg IBW)     Nutrition Diagnosis  Yes;   Nutrition Diagnosis[]Expand by Default  Overweight/obesity  related to Excess energy intake as evidenced by  BMI more than normative standard for age and sex (obesity-grade II 35-39.9)     Nutrition Intervention     Nutrition Prescription  Calories:1200 - 1500  Protein:65 - 82  Fluid:64     Nutrition Education:    Calorie controlled menu  Lean protein food choices  Healthy snack options  Food journaling tips        Nutrition Counseling:  Strategies: meal planning, portion sizes, healthy snack choices, hydration, fiber intake, protein intake, exercise, food journal        Monitoring and Evaluation:  Evaluation criteria:  Energy Intake  Meet protein needs  Maintain adequate hydration  Monitor weekly weight  Meal planning/preparation  Food journal   Decreased portions at mealtimes and snacks  Physical activity      Barriers to learning:none  Readiness to change: Action:  (Changing behavior)  Comprehension: good  Expected Compliance: good

## 2024-09-04 DIAGNOSIS — E03.9 HYPOTHYROIDISM, UNSPECIFIED TYPE: ICD-10-CM

## 2024-09-04 RX ORDER — LEVOTHYROXINE SODIUM 50 UG/1
50 TABLET ORAL DAILY
Qty: 90 TABLET | Refills: 0 | Status: SHIPPED | OUTPATIENT
Start: 2024-09-04

## 2024-09-13 ENCOUNTER — OFFICE VISIT (OUTPATIENT)
Dept: BARIATRICS | Facility: CLINIC | Age: 34
End: 2024-09-13
Payer: COMMERCIAL

## 2024-09-13 VITALS
HEIGHT: 64 IN | BODY MASS INDEX: 38.24 KG/M2 | WEIGHT: 224 LBS | HEART RATE: 82 BPM | DIASTOLIC BLOOD PRESSURE: 88 MMHG | SYSTOLIC BLOOD PRESSURE: 130 MMHG

## 2024-09-13 DIAGNOSIS — E28.2 POLYCYSTIC OVARIAN SYNDROME: ICD-10-CM

## 2024-09-13 DIAGNOSIS — R73.03 PREDIABETES: ICD-10-CM

## 2024-09-13 DIAGNOSIS — E66.01 CLASS 2 SEVERE OBESITY DUE TO EXCESS CALORIES WITH SERIOUS COMORBIDITY AND BODY MASS INDEX (BMI) OF 39.0 TO 39.9 IN ADULT (HCC): Primary | ICD-10-CM

## 2024-09-13 PROCEDURE — 99214 OFFICE O/P EST MOD 30 MIN: CPT | Performed by: NURSE PRACTITIONER

## 2024-09-13 RX ORDER — TIRZEPATIDE 2.5 MG/.5ML
2.5 INJECTION, SOLUTION SUBCUTANEOUS WEEKLY
Qty: 2 ML | Refills: 0 | Status: SHIPPED | OUTPATIENT
Start: 2024-09-13 | End: 2024-10-11

## 2024-09-13 NOTE — PROGRESS NOTES
Assessment/Plan:     Class 2 severe obesity due to excess calories with serious comorbidity and body mass index (BMI) of 39.0 to 39.9 in adult (HCC)  - Patient is pursuing Conservative Program and follow up visits with medical weight management provider  - Initial weight loss goal of 5-10% weight loss for improved health. Weight loss can improve patient's co-morbid conditions and/or prevent weight-related complications.  - Explained the importance of continuing lifestyle changes in addition to any anti-obesity medications.   - Labs reviewed from 12/2023    General Recommendations:  Nutrition:  Eat breakfast daily.  Do not skip meals.      Food log (ie.) www.Hapzing.com, sparkpeople.com, loseit.com, calorieking.com, etc.     Practice mindful eating.  Be sure to set aside time to eat, eat slowly, and savor your food.     Hydration:    At least 64oz of water daily.  No sugar sweetened beverages.  No juice (eat the fruit instead).     Exercise:  Studies have shown that the ideal exercise goal is somewhere between 150 to 300 minutes of moderate intensity exercise a week.  Start with exercising 10 minutes every other day and gradually increase physical activity with a goal of at least 150 minutes of moderate intensity exercise a week, divided over at least 3 days a week.  An example of this would be exercising 30 minutes a day, 5 days a week.  Resistance training can increase muscle mass and increase our resting metabolic rate.   FULL BODY resistance training is recommended 2-3 times a week.  Do not do this on consecutive days to allow for muscle recovery.     Aim for a bare minimum 5000 steps, even on days you do not exercise.     Monitoring:   Weigh yourself daily.  If this causes undue stress, then just weigh yourself once a week.  Weigh yourself the same time of the day with the same amount of clothing on.  Preferably this should be done after waking up, before you eat, and with no clothing or minimal clothing  on.     Specific Goals:  Patient lifestyle habits were reviewed and patient was congratulated on her weight loss since last office visit.  Patient has been participating in the medical weight management program for almost 3 months and has been successfully making changes to her lifestyle habits.  She has been meeting with a dietitian and adjusting her nutrition plans.  She has increased her water intake and increased her activity level.  Patient is very happy with his progress and was congratulated on the efforts she has made.  Zepbound was discussed again as a tool to continue with her weight loss journey and help with her metabolic disease.  Patient would still like to pursue this therapy once covered by her insurance.  Patient would benefit from this medication to improve her prediabetes as well as improve inflammation and strain on her muscle/joints with her MS.    Prediabetes  Latest A1c was 5.8  Patient would benefit from GLP-1 therapy to reduce her blood sugar levels    Polycystic ovarian syndrome  Would likely benefit from a GLP-1 medication to help with insulin resistance and metabolic disorder         Heath was seen today for follow-up.    Diagnoses and all orders for this visit:    Class 2 severe obesity due to excess calories with serious comorbidity and body mass index (BMI) of 39.0 to 39.9 in adult (HCC)  -     tirzepatide (Zepbound) 2.5 mg/0.5 mL auto-injector; Inject 0.5 mL (2.5 mg total) under the skin once a week for 28 days    Polycystic ovarian syndrome  -     tirzepatide (Zepbound) 2.5 mg/0.5 mL auto-injector; Inject 0.5 mL (2.5 mg total) under the skin once a week for 28 days    Prediabetes  -     tirzepatide (Zepbound) 2.5 mg/0.5 mL auto-injector; Inject 0.5 mL (2.5 mg total) under the skin once a week for 28 days      Patient denies personal history of pancreatitis. Patient also denies personal and family history of medullary thyroid cancer and multiple endocrine neoplasia type 2 (MEN 2  "tumor).       Total time spent reviewing chart, interviewing patient, examining patient, discussing plan, answering all questions, and documentin minutes with >50% face-to-face time with the patient.    Follow up in approximately 2 months with Non-Surgical Physician/Advanced Practitioner.    Subjective:   Chief Complaint   Patient presents with    Follow-up     Pt is here for MWM f/u. Waist - 45.5\"       Patient ID: Heath Ballard  is a 34 y.o. female with excess weight/obesity here to pursue weight management.  Patient is pursuing Conservative Program.   Most recent notes and records were reviewed.    HPI    Wt Readings from Last 20 Encounters:   24 102 kg (224 lb)   24 104 kg (228 lb 3.2 oz)   24 106 kg (234 lb 4.8 oz)   24 103 kg (227 lb 12.8 oz)   24 104 kg (230 lb 3.2 oz)   24 106 kg (234 lb)   23 110 kg (241 lb 9.6 oz)   23 102 kg (225 lb)   23 103 kg (227 lb 9.6 oz)   23 101 kg (223 lb)   23 101 kg (222 lb)   23 100 kg (221 lb 6.4 oz)   22 103 kg (227 lb 11.2 oz)   22 106 kg (233 lb)   08/10/22 102 kg (223 lb 12.8 oz)   22 104 kg (230 lb)   22 109 kg (239 lb 6.4 oz)   21 105 kg (231 lb 6.4 oz)   21 105 kg (230 lb 6.4 oz)   21 96.6 kg (213 lb)       Patient presents today to medical weight management office for follow up.  Patient was last seen in the office almost 3 months ago and since last office visit patient has met with the dietitian multiple times.  She has been focused on changing her nutrition.  She has been increasing her protein intake and focusing on smaller portions.  She is learning how to balance her calories more evenly throughout the day and has had success with weight loss using any changes.  She has also started to increase her water intake.  Patient feels that her energy level has increased and she is overall feeling better.  Patient is still going to pursue GLP-1 medication " to continue with her weight loss journey and enhance the rate of her weight loss.        Weight loss medication and dose: none  Started weight and date: 230.2 lbs in 7/2024  Current weight: 224 lbs  Difference: -6.2 lbs  Goal weight: 150-160 lbs    Starting BMI: 39.51 in 7/2024  Current BMI: 38.45    Waist Measurements:  7/2024: 45.75 in  9/2024: 45.5 in    Nutrition Prescription  Calories:1200 - 1500  Protein:65 - 82  Fluid:64    Diet recall:  B: yogurt with granola and fruit OR protein shake  L: skips OR leftovers OR hotpocket  D: protein and starch and vegetable  S: recently stopped    Hydration: water, coffee with sugar  Alcohol: no  Smoking: yes - 1ppd  Exercise: walking 2-4 miles per day  Occupation: works at a [employer]  Sleep: ok        The following portions of the patient's history were reviewed and updated as appropriate: allergies, current medications, past family history, past medical history, past social history, past surgical history, and problem list.    Family History   Problem Relation Age of Onset    Diabetes Mother     Lupus Mother     Hypertension Mother     Cancer Maternal Grandmother     Heart failure Paternal Grandfather     Stroke Maternal Aunt     Stroke Maternal Aunt     Stroke Maternal Aunt     Breast cancer Neg Hx     Colon cancer Neg Hx     Ovarian cancer Neg Hx     Uterine cancer Neg Hx     Cervical cancer Neg Hx         Review of Systems   Constitutional:  Negative for fatigue.   HENT:  Negative for sore throat.    Respiratory:  Negative for cough and shortness of breath.    Cardiovascular:  Negative for chest pain, palpitations and leg swelling.   Gastrointestinal:  Negative for abdominal pain, constipation, diarrhea and nausea.   Genitourinary:  Negative for dysuria.   Musculoskeletal:  Negative for arthralgias and back pain.   Skin:  Negative for rash.   Neurological:  Negative for headaches.   Psychiatric/Behavioral:  Negative for dysphoric mood. The patient is not  "nervous/anxious.        Objective:  /88   Pulse 82   Ht 5' 4\" (1.626 m)   Wt 102 kg (224 lb)   LMP 09/08/2024 (Approximate)   BMI 38.45 kg/m²     Physical Exam  Vitals and nursing note reviewed.   Constitutional:       Appearance: Normal appearance. She is obese.   HENT:      Head: Normocephalic.   Pulmonary:      Effort: Pulmonary effort is normal.   Neurological:      General: No focal deficit present.      Mental Status: She is alert and oriented to person, place, and time.   Psychiatric:         Mood and Affect: Mood normal.         Behavior: Behavior normal.         Thought Content: Thought content normal.         Judgment: Judgment normal.            Labs   Most recent labs reviewed   Lab Results   Component Value Date     (L) 03/16/2014    SODIUM 139 12/02/2023    K 4.3 12/02/2023     12/02/2023    CO2 25 12/02/2023    ANIONGAP 10 03/16/2014    AGAP 10 12/02/2023    BUN 16 12/02/2023    CREATININE 0.77 12/02/2023    GLUC 104 (H) 04/08/2022    GLUF 83 12/02/2023    CALCIUM 9.3 12/02/2023    AST 19 12/02/2023    ALT 22 12/02/2023    ALKPHOS 71 12/02/2023    TP 7.4 12/02/2023    TBILI 0.33 12/02/2023    EGFR 100 12/02/2023     Lab Results   Component Value Date    HGBA1C 5.8 (H) 12/02/2023     Lab Results   Component Value Date    DNZ6XDQGTCQN 2.395 12/02/2023    TSH 0.74 12/11/2020     Lab Results   Component Value Date    CHOLESTEROL 218 (H) 12/02/2023     Lab Results   Component Value Date    HDL 49 (L) 12/02/2023     Lab Results   Component Value Date    TRIG 271 (H) 12/02/2023     Lab Results   Component Value Date    LDLCALC 115 (H) 12/02/2023     "

## 2024-09-13 NOTE — ASSESSMENT & PLAN NOTE
- Patient is pursuing Conservative Program and follow up visits with medical weight management provider  - Initial weight loss goal of 5-10% weight loss for improved health. Weight loss can improve patient's co-morbid conditions and/or prevent weight-related complications.  - Explained the importance of continuing lifestyle changes in addition to any anti-obesity medications.   - Labs reviewed from 12/2023    General Recommendations:  Nutrition:  Eat breakfast daily.  Do not skip meals.      Food log (ie.) www.VisualDNA.com, sparkpeople.com, loseit.com, calorieking.com, etc.     Practice mindful eating.  Be sure to set aside time to eat, eat slowly, and savor your food.     Hydration:    At least 64oz of water daily.  No sugar sweetened beverages.  No juice (eat the fruit instead).     Exercise:  Studies have shown that the ideal exercise goal is somewhere between 150 to 300 minutes of moderate intensity exercise a week.  Start with exercising 10 minutes every other day and gradually increase physical activity with a goal of at least 150 minutes of moderate intensity exercise a week, divided over at least 3 days a week.  An example of this would be exercising 30 minutes a day, 5 days a week.  Resistance training can increase muscle mass and increase our resting metabolic rate.   FULL BODY resistance training is recommended 2-3 times a week.  Do not do this on consecutive days to allow for muscle recovery.     Aim for a bare minimum 5000 steps, even on days you do not exercise.     Monitoring:   Weigh yourself daily.  If this causes undue stress, then just weigh yourself once a week.  Weigh yourself the same time of the day with the same amount of clothing on.  Preferably this should be done after waking up, before you eat, and with no clothing or minimal clothing on.     Specific Goals:  Patient lifestyle habits were reviewed and patient was congratulated on her weight loss since last office visit.  Patient has  been participating in the medical weight management program for almost 3 months and has been successfully making changes to her lifestyle habits.  She has been meeting with a dietitian and adjusting her nutrition plans.  She has increased her water intake and increased her activity level.  Patient is very happy with his progress and was congratulated on the efforts she has made.  Zepbound was discussed again as a tool to continue with her weight loss journey and help with her metabolic disease.  Patient would still like to pursue this therapy once covered by her insurance.  Patient would benefit from this medication to improve her prediabetes as well as improve inflammation and strain on her muscle/joints with her MS.

## 2024-09-13 NOTE — ASSESSMENT & PLAN NOTE
Would likely benefit from a GLP-1 medication to help with insulin resistance and metabolic disorder

## 2024-09-14 ENCOUNTER — HOSPITAL ENCOUNTER (OUTPATIENT)
Dept: MRI IMAGING | Facility: HOSPITAL | Age: 34
Discharge: HOME | End: 2024-09-14
Payer: COMMERCIAL

## 2024-09-14 DIAGNOSIS — G35 MS (MULTIPLE SCLEROSIS) (HCC): ICD-10-CM

## 2024-09-14 PROCEDURE — 70551 MRI BRAIN STEM W/O DYE: CPT

## 2024-09-16 ENCOUNTER — TELEPHONE (OUTPATIENT)
Age: 34
End: 2024-09-16

## 2024-09-16 NOTE — TELEPHONE ENCOUNTER
Left detailed msg (per consent in chart) regarding results and recommendations below.     Requested return call re: any MS symptoms and to schedule sooner FU appt.

## 2024-09-16 NOTE — TELEPHONE ENCOUNTER
----- Message from ICNTIA Casper sent at 9/16/2024  2:34 PM EDT -----  Please call patient and let her know MRI Brain does show 2 new lesions. Does she have any new or worsening symptoms concerning for relapse? If so, I would like to send her back for contrast and would treat with IV steroids. If no new or worsening symptoms, it is likely new MS lesions occurred since she has been off of disease modifying therapy but are not brand new at this time. Please ask her to move up her follow up visit with me to discuss DMT options.

## 2024-09-18 NOTE — TELEPHONE ENCOUNTER
I do not think the facial flushing is related to relapse since this has been ongoing for over 1 year. If no other symptoms then we will defer contrast and IV steroids at this time but do recommend a sooner follow up to discuss DMT options

## 2024-09-18 NOTE — TELEPHONE ENCOUNTER
Outbound call placed to patient.    Patient reported she is not sure if the symptom of facial flushing and warmth no matter the temperature/environment daily could be considered as new. Pt stated that she discussed this concern with provider previously. Stated that it does still occur daily ever since stopping the Tecfidera a year ago.     NOV 11/6/24    Yissel: please advise. Pt aware of the options of either getting a repeat MRI and IV steroids or scheduling a FU appt to discuss DMT options. Pt uncertain if the ongoing symptom is technically old or new and would be considered a true relapse.

## 2024-09-20 ENCOUNTER — CLINICAL SUPPORT (OUTPATIENT)
Dept: BARIATRICS | Facility: CLINIC | Age: 34
End: 2024-09-20

## 2024-09-20 DIAGNOSIS — R63.5 ABNORMAL WEIGHT GAIN: Primary | ICD-10-CM

## 2024-09-20 PROCEDURE — RECHECK

## 2024-09-20 NOTE — PROGRESS NOTES
Weight Management Medical Nutrition Assessment  Heath was here today for 2/3 RD visits.  Today she weighs 226.0 lbs which means she was able to maintain her weight since her last visit with me. She just got back from a cruise as well.  She reports that while she did  not log while on vacation, she did try to make healthier food choices.  She was also walking and swimming daily during that time.  Now that she is back, she is logging more consistently and is walking daily.  She is still interested in starting Zepbound and will be reaching out to the provider as it will need a prior authorization to be approved.        Patient seen by Medical Provider in past 6 months:  yes  Requested to schedule appointment with Medical Provider: No        Anthropometric Measurements  Start Weight (#): 230.2   Current Weight (#): 226.0  TBW % Change from start weight:2%  Ideal Body Weight (#):120  Goal Weight (#): under 200 to start  Highest:240 ( 3 months ago)  Lowest: 150     Weight Loss History  Previous weight loss attempts: Counseling with  MD  Self Created Diets (Portion Control, Healthy Food Choices, etc.)     Food and Nutrition Related History     Food Recall  Breakfast:protein shake                        Snack:none  Lunch:trukey, veg casserole  Snack:none  Dinnersalald with protien.   Snack:none        Beverages: coffee/tea and water   Volume of beverage intake: 60 +     Weekends: Same  Cravings: sweets/sugar  Trouble area of day:evening     Frequency of Eating out: irregularly  Food restrictions:none  Cooking: self   Food Shopping: self     Physical Activity Intake  Activity:Walking dog  Frequency:infrequently  Physical limitations/barriers to exercise: MS (sometimes has a flare up and swelling)     Estimated Needs     Aleena Cruz Energy Needs: BMR : 1665   1-2# loss weekly sedentary:  1050 - 1550             1-2# loss weekly lightly active:1349 - 1849  Maintenance calories for sedentary activity level: 2050  Protein:65  - 82      (1.2-1.5g/kg IBW)  Fluid: 64     (35mL/kg IBW)     Nutrition Diagnosis  Yes;   Nutrition Diagnosis[]Expand by Default  Overweight/obesity  related to Excess energy intake as evidenced by  BMI more than normative standard for age and sex (obesity-grade II 35-39.9)     Nutrition Intervention     Nutrition Prescription  Calories:1200 - 1500  Protein:65 - 82  Fluid:64     Nutrition Education:    Calorie controlled menu  Lean protein food choices  Healthy snack options  Food journaling tips        Nutrition Counseling:  Strategies: meal planning, portion sizes, healthy snack choices, hydration, fiber intake, protein intake, exercise, food journal        Monitoring and Evaluation:  Evaluation criteria:  Energy Intake  Meet protein needs  Maintain adequate hydration  Monitor weekly weight  Meal planning/preparation  Food journal   Decreased portions at mealtimes and snacks  Physical activity      Barriers to learning:none  Readiness to change: Action:  (Changing behavior)  Comprehension: good  Expected Compliance: good

## 2024-09-24 ENCOUNTER — APPOINTMENT (OUTPATIENT)
Dept: LAB | Age: 34
End: 2024-09-24
Payer: COMMERCIAL

## 2024-09-24 ENCOUNTER — TELEPHONE (OUTPATIENT)
Dept: NEUROLOGY | Facility: CLINIC | Age: 34
End: 2024-09-24

## 2024-09-24 ENCOUNTER — OFFICE VISIT (OUTPATIENT)
Age: 34
End: 2024-09-24
Payer: COMMERCIAL

## 2024-09-24 VITALS
SYSTOLIC BLOOD PRESSURE: 122 MMHG | DIASTOLIC BLOOD PRESSURE: 86 MMHG | HEIGHT: 64 IN | WEIGHT: 231 LBS | BODY MASS INDEX: 39.44 KG/M2 | HEART RATE: 86 BPM | OXYGEN SATURATION: 96 %

## 2024-09-24 DIAGNOSIS — G35 MS (MULTIPLE SCLEROSIS) (HCC): ICD-10-CM

## 2024-09-24 DIAGNOSIS — G35 MS (MULTIPLE SCLEROSIS) (HCC): Primary | ICD-10-CM

## 2024-09-24 LAB
ALBUMIN SERPL BCG-MCNC: 4.3 G/DL (ref 3.5–5)
ALP SERPL-CCNC: 74 U/L (ref 34–104)
ALT SERPL W P-5'-P-CCNC: 21 U/L (ref 7–52)
ANION GAP SERPL CALCULATED.3IONS-SCNC: 9 MMOL/L (ref 4–13)
AST SERPL W P-5'-P-CCNC: 16 U/L (ref 13–39)
BASOPHILS # BLD AUTO: 0.07 THOUSANDS/ΜL (ref 0–0.1)
BASOPHILS NFR BLD AUTO: 1 % (ref 0–1)
BILIRUB SERPL-MCNC: 0.27 MG/DL (ref 0.2–1)
BUN SERPL-MCNC: 11 MG/DL (ref 5–25)
CALCIUM SERPL-MCNC: 9.4 MG/DL (ref 8.4–10.2)
CHLORIDE SERPL-SCNC: 104 MMOL/L (ref 96–108)
CO2 SERPL-SCNC: 26 MMOL/L (ref 21–32)
CREAT SERPL-MCNC: 0.77 MG/DL (ref 0.6–1.3)
EOSINOPHIL # BLD AUTO: 0.17 THOUSAND/ΜL (ref 0–0.61)
EOSINOPHIL NFR BLD AUTO: 2 % (ref 0–6)
ERYTHROCYTE [DISTWIDTH] IN BLOOD BY AUTOMATED COUNT: 12.4 % (ref 11.6–15.1)
GFR SERPL CREATININE-BSD FRML MDRD: 99 ML/MIN/1.73SQ M
GLUCOSE SERPL-MCNC: 103 MG/DL (ref 65–140)
HCT VFR BLD AUTO: 45.1 % (ref 34.8–46.1)
HGB BLD-MCNC: 15.5 G/DL (ref 11.5–15.4)
IMM GRANULOCYTES # BLD AUTO: 0.01 THOUSAND/UL (ref 0–0.2)
IMM GRANULOCYTES NFR BLD AUTO: 0 % (ref 0–2)
LYMPHOCYTES # BLD AUTO: 2.06 THOUSANDS/ΜL (ref 0.6–4.47)
LYMPHOCYTES NFR BLD AUTO: 28 % (ref 14–44)
MCH RBC QN AUTO: 29.9 PG (ref 26.8–34.3)
MCHC RBC AUTO-ENTMCNC: 34.4 G/DL (ref 31.4–37.4)
MCV RBC AUTO: 87 FL (ref 82–98)
MONOCYTES # BLD AUTO: 0.47 THOUSAND/ΜL (ref 0.17–1.22)
MONOCYTES NFR BLD AUTO: 6 % (ref 4–12)
NEUTROPHILS # BLD AUTO: 4.59 THOUSANDS/ΜL (ref 1.85–7.62)
NEUTS SEG NFR BLD AUTO: 63 % (ref 43–75)
NRBC BLD AUTO-RTO: 0 /100 WBCS
PLATELET # BLD AUTO: 336 THOUSANDS/UL (ref 149–390)
PMV BLD AUTO: 10.7 FL (ref 8.9–12.7)
POTASSIUM SERPL-SCNC: 4.2 MMOL/L (ref 3.5–5.3)
PROT SERPL-MCNC: 7.3 G/DL (ref 6.4–8.4)
RBC # BLD AUTO: 5.18 MILLION/UL (ref 3.81–5.12)
SODIUM SERPL-SCNC: 139 MMOL/L (ref 135–147)
WBC # BLD AUTO: 7.37 THOUSAND/UL (ref 4.31–10.16)

## 2024-09-24 PROCEDURE — 80053 COMPREHEN METABOLIC PANEL: CPT

## 2024-09-24 PROCEDURE — 85025 COMPLETE CBC W/AUTO DIFF WBC: CPT

## 2024-09-24 PROCEDURE — 99215 OFFICE O/P EST HI 40 MIN: CPT

## 2024-09-24 PROCEDURE — 36415 COLL VENOUS BLD VENIPUNCTURE: CPT

## 2024-09-24 RX ORDER — SODIUM CHLORIDE 9 MG/ML
20 INJECTION, SOLUTION INTRAVENOUS ONCE
Status: CANCELLED | OUTPATIENT
Start: 2024-09-25

## 2024-09-24 NOTE — LETTER
September 24, 2024     Patient: Heath Ballard  YOB: 1990  Date of Visit: 9/24/2024      To Whom it May Concern:    Heath Ballard is under my professional care. Heath was seen in my office on 9/24/2024. Heath should be excused from work on 9/25/24 and 9/26/24 for medical treatment.    If you have any questions or concerns, please don't hesitate to call.         Sincerely,        CINTIA Canales, FNP-C  Neurology       CC: No Recipients

## 2024-09-24 NOTE — ASSESSMENT & PLAN NOTE
Heath Ballard is a 34 year old female who presents to the office today to follow up on her MS and related concerns. She was last seen 8/2/24. She returns in follow up after having updated MRI brain imaging 9/14/24 which revealed 2 new white matter lesions, one within the left posterior lateral temporal lobe inferiorly and one within the right anterior frontal white matter. This study was completed without contrast as she was asymptomatic at the time the study was ordered. However today she tells me she has noticed more dizziness over the last 2 months, daily headaches and overall her body feels very fatigued and sore x a few weeks. She remains off of DMT at this time and has returned today to discuss options given disease progression. We will treat her acutely with 2 days of IV Solumedrol and will ask our nurse navigator to facilitate this. For management of her MS moving forward we discussed alternative DMT options: Tysabri, Kesimpta and Ocrevus. After reviewing MOA, administration, risks and benefits including common and rare adverse effects, she decided she would like to move forward with initiating Ocrevus. She feels she would be most compliant with this. She was given a educational pamphlet to bring home and review with her wife. She is aware of the need for labs prior to each infusion. I have asked our nurse navigator to initiate the start form for this, and she is aware that she will be in touch when she should go for required screening labs. In the interim, given her new brain lesions I have requested she get new baseline MRI Cervical and Thoracic spine images wwo. She will follow up in 3 months; sooner if needed.    Orders:    MRI cervical spine with and without contrast; Future    MRI thoracic spine with and without contrast; Future    BUN; Future    Creatinine, serum; Future

## 2024-09-24 NOTE — TELEPHONE ENCOUNTER
Lets do labs 7 days prior to Ocrevus, no need for other labs prior (other than the ones she got today)  Will also send her a work note to her mychart to excuse her from work for infusions (discussed with me at appt today)

## 2024-09-24 NOTE — TELEPHONE ENCOUNTER
Pt has coverage with [employer].     Per website Solumedrol , 11655, and 28122 do not require PA.     PA will be required for Ocrevus.     Solumedrol therapy plan entered and sent for signature. Signed.     Called [facility] infusion center and spoke with Karla. Scheduled pt for the following:    Tomorrow 9/25/24 @ 4pm  Thursday 9/26/24 @ 1pm    Pt made aware, she is agreeable. Provided infusion center location. Discussed appt duration. Advised pt she is able to drive as long as she is feeling well after infusion.     Regarding Ocrevus- MRIs to be completed first per note.    Adrienne- does pt require baseline labs prior to Ocrevus start? Or would you like to wait until 7 days prior to infusion?    Ocrevus enrollment submitted via [employer] portal.

## 2024-09-24 NOTE — PROGRESS NOTES
Ambulatory Visit  Name: Heath Ballard      : 1990      MRN: [id number]  Encounter Provider: CINTIA Roger  Encounter Date: 2024   Encounter department: [facility]    Assessment & Plan  MS (multiple sclerosis) (HCC)  Heath Ballard is a 34 year old female who presents to the office today to follow up on her MS and related concerns. She was last seen 24. She returns in follow up after having updated MRI brain imaging 24 which revealed 2 new white matter lesions, one within the left posterior lateral temporal lobe inferiorly and one within the right anterior frontal white matter. This study was completed without contrast as she was asymptomatic at the time the study was ordered. However today she tells me she has noticed more dizziness over the last 2 months, daily headaches and overall her body feels very fatigued and sore x a few weeks. She remains off of DMT at this time and has returned today to discuss options given disease progression. We will treat her acutely with 2 days of IV Solumedrol and will ask our nurse navigator to facilitate this. For management of her MS moving forward we discussed alternative DMT options: Tysabri, Kesimpta and Ocrevus. After reviewing MOA, administration, risks and benefits including common and rare adverse effects, she decided she would like to move forward with initiating Ocrevus. She feels she would be most compliant with this. She was given a educational pamphlet to bring home and review with her wife. She is aware of the need for labs prior to each infusion. I have asked our nurse navigator to initiate the start form for this, and she is aware that she will be in touch when she should go for required screening labs. In the interim, given her new brain lesions I have requested she get new baseline MRI Cervical and Thoracic spine images wwo. She will follow up in 3 months; sooner if needed.    Orders:    MRI cervical  spine with and without contrast; Future    MRI thoracic spine with and without contrast; Future    BUN; Future    Creatinine, serum; Future      Patient Instructions   Will plan for 2 days of IV steroids at [facility]   Will start to initiate Ocrevus, will need labs prior   Complete MRI Cervical and Thoracic spine wwo; bun/creat first    Follow up in December        History of Present Illness     HPI Heath Ballard is a 34 year old female who presents to the office today to follow up on her MS and related concerns. She was last seen 8/2/24. She returns in follow up after having updated MRI brain imaging 9/14/24 which revealed 2 new white matter lesions, one within the left posterior lateral temporal lobe inferiorly and one within the right anterior frontal white matter. This study was completed without contrast as she was asymptomatic at the time the study was ordered. However today she tells me she has noticed more dizziness over the last 2 months, daily headaches and overall her body feels very fatigued and sore. She remains off of DMT at this time and has returned today to discuss options given new lesions.    To review, she was maintained on Copaxone (site reactions) and then Aubagio (diffuse body pain) in the past. She has been off of Aubagio since April 2021 without known disease progression. She started Dimethyl Fumerate (8/2022). Unfortunately, she was lost to follow up and did not complete surveillance labs therefore discontinued Dimethyl Fumerate about 1 year ago. She states she had side effects: photosensitivity rash, and worse heat intolerance which is why she opted to discontinue treatment. She states since that time she had been taking more of a holistic approach- seeing a dietician, eating better, more physical activity walking 3-4 miles a day. Despite being off of DMT she denied any new focal neurologic complaints since she was last seen that are concerning for relapse.     She denies any  weakness, numbness/tingling, imbalance, bowel/bladder change, or vision change/eye pain. She denies any recent illness or infection including URI or UTI.     Review of Systems   Constitutional:  Positive for fatigue. Negative for appetite change and fever.   HENT: Negative.  Negative for hearing loss, tinnitus, trouble swallowing and voice change.    Eyes: Negative.  Negative for photophobia, pain and visual disturbance.   Respiratory: Negative.  Negative for shortness of breath.    Cardiovascular: Negative.  Negative for palpitations.   Gastrointestinal: Negative.  Negative for nausea and vomiting.   Endocrine: Negative.  Negative for cold intolerance.   Genitourinary: Negative.  Negative for dysuria, frequency and urgency.   Musculoskeletal:  Positive for myalgias. Negative for back pain, gait problem, neck pain and neck stiffness.   Skin: Negative.  Negative for rash.   Allergic/Immunologic: Negative.    Neurological:  Positive for dizziness. Negative for tremors, seizures, syncope, facial asymmetry, speech difficulty, weakness, light-headedness, numbness and headaches.   Hematological: Negative.  Does not bruise/bleed easily.   Psychiatric/Behavioral: Negative.  Negative for confusion, hallucinations and sleep disturbance.    All other systems reviewed and are negative.    I have personally reviewed the MA's review of systems and made changes as necessary.    Current Outpatient Medications on File Prior to Visit   Medication Sig Dispense Refill    ciclopirox (LOPROX) 0.77 % cream Apply to affected area two times daily (Patient taking differently: if needed) 90 g 0    fluticasone (FLONASE) 50 mcg/act nasal spray       ibuprofen (MOTRIN) 800 mg tablet Take 1 tablet (800 mg total) by mouth every 8 (eight) hours as needed for mild pain 60 tablet 6    ketorolac (TORADOL) 10 mg tablet Take 1 tablet at the onset of a migraine headache 10 tablet 2    levothyroxine 50 mcg tablet Take 1 tablet (50 mcg total) by mouth  daily 90 tablet 0    metFORMIN (GLUCOPHAGE-XR) 500 mg 24 hr tablet TAKE TWO TABLETS BY MOUTH TWICE DAILY WITH MEALS 360 tablet 0    tirzepatide (Zepbound) 2.5 mg/0.5 mL auto-injector Inject 0.5 mL (2.5 mg total) under the skin once a week for 28 days (Patient not taking: Reported on 9/24/2024) 2 mL 0     No current facility-administered medications on file prior to visit.      Objective     LMP 09/08/2024 (Approximate)     Neurologic Exam  On neurological examination patient is alert, awake, oriented and in no distress. Speech is fluent without dysarthria or aphasia. Cranial nerves 2-12 were symmetrically intact bilaterally. She is able to rise easily without assistance from a seated position. Casual gait is normal including stance, stride, and arm swing.         Results/Data:      I have reviewed radiology reports from 9/14/24 including: MRI brain.    Administrative Statements     I have spent a total time of 40 minutes in caring for this patient on the day of the visit/encounter including Diagnostic results, Prognosis, Risks and benefits of tx options, Instructions for management, Patient and family education, Importance of tx compliance, Risk factor reductions, Impressions, Documenting in the medical record, Reviewing / ordering tests, medicine, procedures  , Obtaining or reviewing history  , and Communicating with other healthcare professionals .

## 2024-09-24 NOTE — Clinical Note
Hey!   Can we please arrange 2 days of IV solumedrol for Heath? Preferably at [place]  We are also going to start her on Ocrevus as a new start.

## 2024-09-24 NOTE — LETTER
September 24, 2024     Patient: Heath Ballard  YOB: 1990  Date of Visit: 9/24/2024      To Whom it May Concern:    Heath Ballard is under my professional care. Heath was seen in my office on 9/24/2024. Please excuse Heath from work on 9/23/24 and the morning of 9/24/24. Heath may return to work on 9/24/24 after her appointment .    If you have any questions or concerns, please don't hesitate to call.         Sincerely,          CINTIA oRger        CC: No Recipients

## 2024-09-24 NOTE — Clinical Note
GURDEEP regarding disease progression off of DMT. She will be starting Ocrevus but also providing 2 days of IV solumedrol in the interim.

## 2024-09-24 NOTE — PATIENT INSTRUCTIONS
Will plan for 2 days of IV steroids at [facility]   Will start to initiate Ocrevus, will need labs prior   Complete MRI Cervical and Thoracic spine wwo; bun/creat first    Follow up in December

## 2024-09-24 NOTE — TELEPHONE ENCOUNTER
----- Message from CINTIA Casper sent at 9/24/2024 11:50 AM EDT -----  Hey!     Can we please arrange 2 days of IV solumedrol for Heath? Preferably at [place]    We are also going to start her on Ocrevus as a new start.

## 2024-09-25 ENCOUNTER — HOSPITAL ENCOUNTER (OUTPATIENT)
Dept: INFUSION CENTER | Facility: CLINIC | Age: 34
Discharge: HOME | End: 2024-09-25
Payer: COMMERCIAL

## 2024-09-25 DIAGNOSIS — G35 MS (MULTIPLE SCLEROSIS) (HCC): Primary | ICD-10-CM

## 2024-09-25 PROCEDURE — 96365 THER/PROPH/DIAG IV INF INIT: CPT

## 2024-09-25 RX ORDER — SODIUM CHLORIDE 9 MG/ML
20 INJECTION, SOLUTION INTRAVENOUS ONCE
Status: CANCELLED | OUTPATIENT
Start: 2024-09-26

## 2024-09-25 RX ORDER — SODIUM CHLORIDE 9 MG/ML
20 INJECTION, SOLUTION INTRAVENOUS ONCE
Status: COMPLETED | OUTPATIENT
Start: 2024-09-25 | End: 2024-09-25

## 2024-09-25 RX ADMIN — SODIUM CHLORIDE 20 ML/HR: 0.9 INJECTION, SOLUTION INTRAVENOUS at 16:16

## 2024-09-25 RX ADMIN — SODIUM CHLORIDE 1000 MG: 0.9 INJECTION, SOLUTION INTRAVENOUS at 16:16

## 2024-09-25 NOTE — PROGRESS NOTES
Pt here for solu-medrol infusion, offering no complaints. PIV established with positive blood return noted. Tolerated entire infusion without complications. PIV flushed and removed. AVS declined. Next appt 9/26 1pm. Walked out in stable condition.

## 2024-09-26 ENCOUNTER — HOSPITAL ENCOUNTER (OUTPATIENT)
Dept: INFUSION CENTER | Facility: CLINIC | Age: 34
Discharge: HOME | End: 2024-09-26
Payer: COMMERCIAL

## 2024-09-26 VITALS
SYSTOLIC BLOOD PRESSURE: 139 MMHG | DIASTOLIC BLOOD PRESSURE: 89 MMHG | TEMPERATURE: 96.9 F | HEART RATE: 78 BPM | RESPIRATION RATE: 20 BRPM

## 2024-09-26 DIAGNOSIS — G35 MS (MULTIPLE SCLEROSIS) (HCC): Primary | ICD-10-CM

## 2024-09-26 PROCEDURE — 96365 THER/PROPH/DIAG IV INF INIT: CPT

## 2024-09-26 RX ORDER — SODIUM CHLORIDE 9 MG/ML
20 INJECTION, SOLUTION INTRAVENOUS ONCE
Status: COMPLETED | OUTPATIENT
Start: 2024-09-26 | End: 2024-09-26

## 2024-09-26 RX ORDER — SODIUM CHLORIDE 9 MG/ML
20 INJECTION, SOLUTION INTRAVENOUS ONCE
Status: CANCELLED | OUTPATIENT
Start: 2024-09-26

## 2024-09-26 RX ADMIN — SODIUM CHLORIDE 1000 MG: 0.9 INJECTION, SOLUTION INTRAVENOUS at 13:28

## 2024-09-26 RX ADMIN — SODIUM CHLORIDE 20 ML/HR: 0.9 INJECTION, SOLUTION INTRAVENOUS at 13:29

## 2024-09-26 NOTE — PROGRESS NOTES
Patient presents today for solu-medrol infusion. PIV placed with positive blood return. Patient tolerated infusion well without complications. PIV removed.  AVS declined.

## 2024-09-26 NOTE — TELEPHONE ENCOUNTER
Noted.     MRIs scheduled for 10/19/24.     Erick NELSON submitted on [employer]. Pending auth # [id number]. Awaiting determination.

## 2024-09-27 ENCOUNTER — NURSE TRIAGE (OUTPATIENT)
Age: 34
End: 2024-09-27

## 2024-09-27 NOTE — TELEPHONE ENCOUNTER
"Pt called in. Pt reports that Zepbound 2.5mg was denied by insurance company and then appeal was denied as well. Pt states that appeal denial wanted Pt to have 3 months nutrition counseling with RD. Pt reports that she has been seeing RD and the 3-month chad will be on 10/2/24. Pt asking if Deidra will be able to write 2nd letter of appeal to insurance company today or if she is going to wait until 10/2.    Pt requesting call back from Deidra today with what her plan is: [phone]    Reason for Disposition   Nursing judgment    Answer Assessment - Initial Assessment Questions  1. REASON FOR CALL or QUESTION: \"What is your reason for calling today?\" or \"How can I best help you?\" or \"What question do you have that I can help answer?\"      Zepbound 2nd appeal request    Protocols used: Information Only Call - No Triage-ADULT-[place]    "

## 2024-10-03 NOTE — TELEPHONE ENCOUNTER
Received letter stating Ocrevus has been denied. Awaiting 2nd letter with denial reason. If not received, will call plan.

## 2024-10-08 ENCOUNTER — PATIENT MESSAGE (OUTPATIENT)
Age: 34
End: 2024-10-08

## 2024-10-08 DIAGNOSIS — E03.9 HYPOTHYROIDISM, UNSPECIFIED TYPE: ICD-10-CM

## 2024-10-09 RX ORDER — LEVOTHYROXINE SODIUM 50 UG/1
50 TABLET ORAL DAILY
Qty: 90 TABLET | Refills: 1 | Status: SHIPPED | OUTPATIENT
Start: 2024-10-09

## 2024-10-15 NOTE — PATIENT COMMUNICATION
Spoke with pt, she reports form was faxed with her name and  on it. Did advise form is not in chart. Not received in faxes. Pt questioning if Adrienne or Luiz have received form. Please advise.     If not received, please advise pt to fax again directly to one of the offices. She is not able to send form via the [employer].     Thanks!

## 2024-10-17 ENCOUNTER — TELEPHONE (OUTPATIENT)
Dept: NEUROLOGY | Facility: CLINIC | Age: 34
End: 2024-10-17

## 2024-10-17 NOTE — PATIENT COMMUNICATION
Forms received via [facility] - scanned into chart for review

## 2024-10-17 NOTE — TELEPHONE ENCOUNTER
LMOM for pt to let her know that we have not received forms that she said she would fax over. Provided fax number and phone number to call back with any questions

## 2024-10-18 ENCOUNTER — CLINICAL SUPPORT (OUTPATIENT)
Dept: BARIATRICS | Facility: CLINIC | Age: 34
End: 2024-10-18

## 2024-10-18 ENCOUNTER — PATIENT MESSAGE (OUTPATIENT)
Age: 34
End: 2024-10-18

## 2024-10-18 ENCOUNTER — TELEPHONE (OUTPATIENT)
Dept: NEUROLOGY | Facility: CLINIC | Age: 34
End: 2024-10-18

## 2024-10-18 VITALS — WEIGHT: 231 LBS | BODY MASS INDEX: 39.44 KG/M2 | HEIGHT: 64 IN

## 2024-10-18 DIAGNOSIS — R63.5 ABNORMAL WEIGHT GAIN: Primary | ICD-10-CM

## 2024-10-18 DIAGNOSIS — E66.812 CLASS 2 SEVERE OBESITY DUE TO EXCESS CALORIES WITH SERIOUS COMORBIDITY AND BODY MASS INDEX (BMI) OF 39.0 TO 39.9 IN ADULT (HCC): Primary | ICD-10-CM

## 2024-10-18 DIAGNOSIS — R73.03 PREDIABETES: ICD-10-CM

## 2024-10-18 DIAGNOSIS — E28.2 POLYCYSTIC OVARIAN SYNDROME: ICD-10-CM

## 2024-10-18 DIAGNOSIS — E66.01 CLASS 2 SEVERE OBESITY DUE TO EXCESS CALORIES WITH SERIOUS COMORBIDITY AND BODY MASS INDEX (BMI) OF 39.0 TO 39.9 IN ADULT (HCC): Primary | ICD-10-CM

## 2024-10-18 PROCEDURE — RECHECK

## 2024-10-18 RX ORDER — TIRZEPATIDE 2.5 MG/.5ML
2.5 INJECTION, SOLUTION SUBCUTANEOUS WEEKLY
Qty: 2 ML | Refills: 0 | Status: SHIPPED | OUTPATIENT
Start: 2024-10-18 | End: 2024-11-15

## 2024-10-18 NOTE — PROGRESS NOTES
"Weight Management Medical Nutrition Assessment  Heath was here today for 3/3 RD visits.  Today she weighs 231.0 lbs.  She did have work boots on which she opted not to take off.  She did ask that I take 4 lbs off her weight.  I did explain that I am not able to do that.  She has MS and mor lesions were found, and she did have infusions of steroids since the last visit which did add to her weight gain.  She is starting to feel better.  She reports that she will be starting another new MS infusion in the next few weeks that will help to get her MS flairs under control.  She reports that given her health situation, she has been stress eating.  She will be starting Zepbound once its approved.  She reports the the neurologist and our PA have been in contact and are \"on the same page\" as far as her starting to take it.  Recommend that start food logging, increase her water intake and exercise.      Patient seen by Medical Provider in past 6 months:  yes  Requested to schedule appointment with Medical Provider: No        Anthropometric Measurements  Start Weight (#): 230.2   Current Weight (#): 231  TBW % Change from start weight:2%  Ideal Body Weight (#):120  Goal Weight (#): under 200 to start  Highest:240 ( 3 months ago)  Lowest: 150     Weight Loss History  Previous weight loss attempts: Counseling with  MD  Self Created Diets (Portion Control, Healthy Food Choices, etc.)     Food and Nutrition Related History     Food Recall  Breakfast:protein shake                        Snack:none  Lunch:trukey, veg casserole  Snack:none  Dinnersalald with protien.   Snack:none        Beverages: coffee/tea and water   Volume of beverage intake: 60 +     Weekends: Same  Cravings: sweets/sugar  Trouble area of day:evening     Frequency of Eating out: irregularly  Food restrictions:none  Cooking: self   Food Shopping: self     Physical Activity Intake  Activity:Walking dog  Frequency:infrequently  Physical limitations/barriers to " exercise: MS (sometimes has a flare up and swelling)     Estimated Needs     Aleena Cruz Energy Needs: BMR : 1665   1-2# loss weekly sedentary:  1050 - 1550             1-2# loss weekly lightly active:1349 - 1849  Maintenance calories for sedentary activity level: 2050  Protein:65 - 82      (1.2-1.5g/kg IBW)  Fluid: 64     (35mL/kg IBW)     Nutrition Diagnosis  Yes;   Nutrition Diagnosis[]Expand by Default  Overweight/obesity  related to Excess energy intake as evidenced by  BMI more than normative standard for age and sex (obesity-grade II 35-39.9)     Nutrition Intervention     Nutrition Prescription  Calories:1200 - 1500  Protein:65 - 82  Fluid:64     Nutrition Education:    Calorie controlled menu  Lean protein food choices  Healthy snack options  Food journaling tips        Nutrition Counseling:  Strategies: meal planning, portion sizes, healthy snack choices, hydration, fiber intake, protein intake, exercise, food journal        Monitoring and Evaluation:  Evaluation criteria:  Energy Intake  Meet protein needs  Maintain adequate hydration  Monitor weekly weight  Meal planning/preparation  Food journal   Decreased portions at mealtimes and snacks  Physical activity      Barriers to learning:none  Readiness to change: Action:  (Changing behavior)  Comprehension: good  Expected Compliance: good

## 2024-10-19 ENCOUNTER — HOSPITAL ENCOUNTER (OUTPATIENT)
Dept: MRI IMAGING | Facility: HOSPITAL | Age: 34
Discharge: HOME | End: 2024-10-19
Payer: COMMERCIAL

## 2024-10-19 DIAGNOSIS — G35 MS (MULTIPLE SCLEROSIS) (HCC): ICD-10-CM

## 2024-10-19 PROCEDURE — A9585 GADOBUTROL INJECTION: HCPCS | Performed by: FAMILY MEDICINE

## 2024-10-19 PROCEDURE — 72156 MRI NECK SPINE W/O & W/DYE: CPT

## 2024-10-19 PROCEDURE — 72157 MRI CHEST SPINE W/O & W/DYE: CPT

## 2024-10-19 RX ORDER — GADOBUTROL 604.72 MG/ML
10 INJECTION INTRAVENOUS
Status: COMPLETED | OUTPATIENT
Start: 2024-10-19 | End: 2024-10-19

## 2024-10-19 RX ADMIN — GADOBUTROL 10 ML: 604.72 INJECTION INTRAVENOUS at 14:08

## 2024-10-22 ENCOUNTER — TELEPHONE (OUTPATIENT)
Dept: BARIATRICS | Facility: CLINIC | Age: 34
End: 2024-10-22

## 2024-10-22 DIAGNOSIS — R93.7 ABNORMAL MRI, THORACIC SPINE: Primary | ICD-10-CM

## 2024-10-26 ENCOUNTER — APPOINTMENT (OUTPATIENT)
Dept: LAB | Facility: MEDICAL CENTER | Age: 34
End: 2024-10-26
Payer: COMMERCIAL

## 2024-10-26 DIAGNOSIS — E03.9 HYPOTHYROIDISM, UNSPECIFIED TYPE: ICD-10-CM

## 2024-10-26 DIAGNOSIS — Z00.00 ANNUAL PHYSICAL EXAM: ICD-10-CM

## 2024-10-26 DIAGNOSIS — E28.2 POLYCYSTIC OVARIAN SYNDROME: ICD-10-CM

## 2024-10-26 DIAGNOSIS — R73.03 PREDIABETES: ICD-10-CM

## 2024-10-26 DIAGNOSIS — G35 MS (MULTIPLE SCLEROSIS) (HCC): ICD-10-CM

## 2024-10-26 LAB
HAV IGM SER QL: NORMAL
HBV CORE IGM SER QL: NORMAL
HBV SURFACE AG SER QL: NORMAL
HCV AB SER QL: NORMAL

## 2024-10-26 PROCEDURE — 80074 ACUTE HEPATITIS PANEL: CPT

## 2024-10-26 PROCEDURE — 36415 COLL VENOUS BLD VENIPUNCTURE: CPT

## 2024-10-28 NOTE — NURSING NOTE
Unable to reach patient, sent instructions and directions in e-mail that is linked to this account and via epic my chart. See message below.  Upcoming Radiology appointment at [facility]  Good afternoon Mrs. Ballard,    You are scheduled on 11/6/24  @ 1 pm  for diagnostic myelogram.      You are to come @ 11:30 am to [facility] at [address]. Present yourself to Admission services that is located on the Ground floor to the left of the information desk in Building B. Please sign in using the Kiosk (if any issues with your registration, an admission personnel will assist you). Once admitted you will be instructed to go up to the 1st floor - Surgical services (it will be to the left at the main corridor on the 1st floor); they will get you changed for your procedure, update medical information, and draw blood work. A platelet and clotting time are needed the day of the procedure to assure your safety and healing post procedure. If you have a functional working (currently on)  spinal, brain or any other type of stimulator please bring with you the controller and the  (or have the controller fully charged) for your study.      You may eat a light non-greasy breakfast/lunch, drink fluids and take all your medications that are prescribed to you. Please do not take any Aspirin and also be cautious of any over the counter medication please check if Aspirin is one of the ingredients (Tylenol, Motrin and Aleve are fine to take). You will need to be off Aspirin for at least 5 days to have the procedure done if you should take any Aspirin.    For this study you will have local anesthetic (Lidocaine) to the lower back, you will be awake during the study.   If you should need an anti-anxiety medication, please contact your primary doctor and request for such medication. Bring the medication with you so you can take an hour prior to the procedure.      If not on fluid restrictions,  please increase your fluid intake 3 days prior to the procedure.    For the procedure you will be on your stomach, we will use an Xray to assist in accessing your cerebral spinal fluid once the area in your lower back is cleaned and you will get a local anesthetic. We will be inserting IV contrast to your CSF, to get the pictures that are needed for your ordering provider you will be inverted towards your head so the contrast can move upward to the needed location. Once the contrast is at the location needed for ideal pictures you will be placed back on to your stretcher and taken over to CT to get the scans needed. Upon completion on the CT, you will be sent back to surgical services where you will be monitored for the allotted time usually 1 hour.    Upon discharge you will need a ride home so please bring a  for this study. If you should use Uber/Lyft you will need to have someone accompany you for your safety.       The night of or the days following you may experience soreness at your lower back and/or  headaches, these are normal and expected. Your discharge instructions will be to rest, hydrate with fluids (caffeine helps also) and take over the counter medication such as Tylenol, Motrin, or Aleve.      Please feel free to call if any other questions or concerns should arise.   Cathy Oh RN  [facility] RN  [address] [place] [id number]  [phone] (Office)  [phone] (Fax)  [email or web address]

## 2024-10-29 NOTE — NURSING NOTE
Called patient to review upcoming procedure of Myelogram, patient acknowledged receiving my chart message and having no questions or concerns at this moment. Informed patient that she may reach out if she should have any question or concerns in the upcoming days to her procedure. Patient appreciated call and information.

## 2024-10-30 NOTE — NURSING NOTE
Received my chart message from patient.   ----- Message -----       From:Heath Ballard       Sent:10/30/2024 12:31 PM EDT         To:User Message Message List    Subject:Upcoming Radiology appointment at [facility]    Good afternoon, Cathy.  Is there any way I could be put to sleep for all of this? Not a fan of needles in my back and it's freaking me out.     Response that was sent to patient via my chart.     This procedure is done without anesthesia, you can request your primary MD for ativan or xanax to help with the anxiety. Bring the pill with you and you can take an hour prior to the procedure.   In rare cases it is done with anesthesia but your doctor has to order it and it will have to be scheduled with Interventional radiology if approved by them. We do not do these with anesthesia.

## 2024-10-30 NOTE — TELEPHONE ENCOUNTER
Cancelled prior authorization through CoverMyMeds and faxed form and 3 RD notes and Deidra's note to Jad. I called pt to let her know that form was faxed and confirmed.

## 2024-11-01 DIAGNOSIS — G35 MS (MULTIPLE SCLEROSIS) (HCC): Primary | ICD-10-CM

## 2024-11-01 RX ORDER — SODIUM CHLORIDE 9 MG/ML
20 INJECTION, SOLUTION INTRAVENOUS ONCE
OUTPATIENT
Start: 2024-11-22

## 2024-11-01 RX ORDER — ACETAMINOPHEN 325 MG/1
650 TABLET ORAL ONCE
OUTPATIENT
Start: 2024-11-22

## 2024-11-05 DIAGNOSIS — R73.03 PREDIABETES: ICD-10-CM

## 2024-11-05 DIAGNOSIS — E28.2 POLYCYSTIC OVARIAN SYNDROME: ICD-10-CM

## 2024-11-05 DIAGNOSIS — E66.812 CLASS 2 SEVERE OBESITY DUE TO EXCESS CALORIES WITH SERIOUS COMORBIDITY AND BODY MASS INDEX (BMI) OF 39.0 TO 39.9 IN ADULT (HCC): ICD-10-CM

## 2024-11-05 DIAGNOSIS — E66.01 CLASS 2 SEVERE OBESITY DUE TO EXCESS CALORIES WITH SERIOUS COMORBIDITY AND BODY MASS INDEX (BMI) OF 39.0 TO 39.9 IN ADULT (HCC): ICD-10-CM

## 2024-11-05 RX ORDER — TIRZEPATIDE 2.5 MG/.5ML
2.5 INJECTION, SOLUTION SUBCUTANEOUS WEEKLY
Qty: 2 ML | Refills: 0 | Status: SHIPPED | OUTPATIENT
Start: 2024-11-05 | End: 2024-11-12 | Stop reason: SDUPTHER

## 2024-11-06 ENCOUNTER — TELEPHONE (OUTPATIENT)
Dept: BARIATRICS | Facility: CLINIC | Age: 34
End: 2024-11-06

## 2024-11-06 ENCOUNTER — HOSPITAL ENCOUNTER (OUTPATIENT)
Dept: RADIOLOGY | Facility: HOSPITAL | Age: 34
Discharge: HOME | End: 2024-11-06
Payer: COMMERCIAL

## 2024-11-06 VITALS
BODY MASS INDEX: 39.27 KG/M2 | SYSTOLIC BLOOD PRESSURE: 112 MMHG | RESPIRATION RATE: 18 BRPM | TEMPERATURE: 97.8 F | HEIGHT: 64 IN | WEIGHT: 230 LBS | DIASTOLIC BLOOD PRESSURE: 73 MMHG | OXYGEN SATURATION: 96 % | HEART RATE: 61 BPM

## 2024-11-06 DIAGNOSIS — R93.7 ABNORMAL MRI, THORACIC SPINE: ICD-10-CM

## 2024-11-06 DIAGNOSIS — M53.9 DISORDER OF THORACIC SPINE: Primary | ICD-10-CM

## 2024-11-06 LAB
APTT PPP: 27 SECONDS (ref 23–34)
EXT PREGNANCY TEST URINE: NEGATIVE
EXT. CONTROL: NORMAL
INR PPP: 0.94 (ref 0.85–1.19)
PLATELET # BLD AUTO: 293 THOUSANDS/UL (ref 149–390)
PMV BLD AUTO: 9.5 FL (ref 8.9–12.7)
PROTHROMBIN TIME: 12.9 SECONDS (ref 12.3–15)

## 2024-11-06 PROCEDURE — 62303 MYELOGRAPHY LUMBAR INJECTION: CPT

## 2024-11-06 PROCEDURE — 81025 URINE PREGNANCY TEST: CPT | Performed by: PHYSICIAN ASSISTANT

## 2024-11-06 PROCEDURE — 85049 AUTOMATED PLATELET COUNT: CPT | Performed by: PHYSICIAN ASSISTANT

## 2024-11-06 PROCEDURE — 85610 PROTHROMBIN TIME: CPT | Performed by: PHYSICIAN ASSISTANT

## 2024-11-06 PROCEDURE — 72129 CT CHEST SPINE W/DYE: CPT

## 2024-11-06 PROCEDURE — 85730 THROMBOPLASTIN TIME PARTIAL: CPT | Performed by: PHYSICIAN ASSISTANT

## 2024-11-06 RX ORDER — LIDOCAINE HYDROCHLORIDE 10 MG/ML
5 INJECTION, SOLUTION EPIDURAL; INFILTRATION; INTRACAUDAL; PERINEURAL
Status: COMPLETED | OUTPATIENT
Start: 2024-11-06 | End: 2024-11-06

## 2024-11-06 RX ORDER — ACETAMINOPHEN 325 MG/1
650 TABLET ORAL EVERY 4 HOURS PRN
Status: CANCELLED | OUTPATIENT
Start: 2024-11-06

## 2024-11-06 RX ADMIN — IOHEXOL 12 ML: 240 INJECTION, SOLUTION INTRATHECAL; INTRAVASCULAR; INTRAVENOUS; ORAL at 13:20

## 2024-11-06 RX ADMIN — LIDOCAINE HYDROCHLORIDE 5 ML: 10 INJECTION, SOLUTION EPIDURAL; INFILTRATION; INTRACAUDAL; PERINEURAL at 13:20

## 2024-11-06 NOTE — TELEPHONE ENCOUNTER
CMM KEY # [id number].  ZEPBOUND 2.5MG.   PA submitted today. Sent MY CHART Message to patient requesting current weight for insurance inquiry.

## 2024-11-06 NOTE — TELEPHONE ENCOUNTER
MY CHART message sent to patient:    Your insurance is inquiring what you currently weigh. I don't see any recent visits in your chart. Please respond to this correspondence with your current weight today.

## 2024-11-08 ENCOUNTER — TELEPHONE (OUTPATIENT)
Age: 34
End: 2024-11-08

## 2024-11-08 NOTE — TELEPHONE ENCOUNTER
Pt is calling to scheduled with Neurosurgery since referral was placed by Marcus. Provided phone number [phone] and warm transferred pt to [facility].

## 2024-11-12 ENCOUNTER — TELEPHONE (OUTPATIENT)
Age: 34
End: 2024-11-12

## 2024-11-12 DIAGNOSIS — E66.01 CLASS 2 SEVERE OBESITY DUE TO EXCESS CALORIES WITH SERIOUS COMORBIDITY AND BODY MASS INDEX (BMI) OF 39.0 TO 39.9 IN ADULT (HCC): ICD-10-CM

## 2024-11-12 DIAGNOSIS — E66.812 CLASS 2 SEVERE OBESITY DUE TO EXCESS CALORIES WITH SERIOUS COMORBIDITY AND BODY MASS INDEX (BMI) OF 39.0 TO 39.9 IN ADULT (HCC): ICD-10-CM

## 2024-11-12 DIAGNOSIS — E28.2 POLYCYSTIC OVARIAN SYNDROME: ICD-10-CM

## 2024-11-12 DIAGNOSIS — R73.03 PREDIABETES: ICD-10-CM

## 2024-11-12 RX ORDER — TIRZEPATIDE 2.5 MG/.5ML
2.5 INJECTION, SOLUTION SUBCUTANEOUS WEEKLY
Qty: 2 ML | Refills: 0 | Status: SHIPPED | OUTPATIENT
Start: 2024-11-12 | End: 2024-12-10

## 2024-11-12 NOTE — TELEPHONE ENCOUNTER
Received [employer] forms in [employer] drive. Forms signed, faxed, confirmed and scanned.

## 2024-11-12 NOTE — TELEPHONE ENCOUNTER
Patient of CINTIA Jean Baptiste. Has found pharmacy that has one box left of the Zepbound 2.4 mg. If prescription could be sent over to [facility], [place] ASAP, Patient would be very grateful. She would like a call back after sent.

## 2024-11-15 ENCOUNTER — APPOINTMENT (OUTPATIENT)
Dept: LAB | Facility: MEDICAL CENTER | Age: 34
End: 2024-11-15
Payer: COMMERCIAL

## 2024-11-15 DIAGNOSIS — G35 MS (MULTIPLE SCLEROSIS) (HCC): ICD-10-CM

## 2024-11-15 DIAGNOSIS — Z76.89 ENCOUNTER BEFORE STARTING MEDICATION: ICD-10-CM

## 2024-11-15 DIAGNOSIS — Z11.1 TUBERCULOSIS SCREENING: ICD-10-CM

## 2024-11-15 DIAGNOSIS — Z11.59 ENCOUNTER FOR SCREENING FOR VIRAL DISEASE: ICD-10-CM

## 2024-11-15 LAB
ALBUMIN SERPL BCG-MCNC: 4.6 G/DL (ref 3.5–5)
ALP SERPL-CCNC: 70 U/L (ref 34–104)
ALT SERPL W P-5'-P-CCNC: 26 U/L (ref 7–52)
ANION GAP SERPL CALCULATED.3IONS-SCNC: 14 MMOL/L (ref 4–13)
AST SERPL W P-5'-P-CCNC: 24 U/L (ref 13–39)
BASOPHILS # BLD AUTO: 0.04 THOUSANDS/ÂΜL (ref 0–0.1)
BASOPHILS NFR BLD AUTO: 0 % (ref 0–1)
BILIRUB SERPL-MCNC: 0.44 MG/DL (ref 0.2–1)
BUN SERPL-MCNC: 21 MG/DL (ref 5–25)
CALCIUM SERPL-MCNC: 10.3 MG/DL (ref 8.4–10.2)
CHLORIDE SERPL-SCNC: 105 MMOL/L (ref 96–108)
CHOLEST SERPL-MCNC: 189 MG/DL (ref ?–200)
CO2 SERPL-SCNC: 23 MMOL/L (ref 21–32)
CREAT SERPL-MCNC: 0.89 MG/DL (ref 0.6–1.3)
EOSINOPHIL # BLD AUTO: 0.25 THOUSAND/ÂΜL (ref 0–0.61)
EOSINOPHIL NFR BLD AUTO: 3 % (ref 0–6)
ERYTHROCYTE [DISTWIDTH] IN BLOOD BY AUTOMATED COUNT: 12.6 % (ref 11.6–15.1)
EST. AVERAGE GLUCOSE BLD GHB EST-MCNC: 126 MG/DL
GFR SERPL CREATININE-BSD FRML MDRD: 83 ML/MIN/1.73SQ M
GLUCOSE P FAST SERPL-MCNC: 88 MG/DL (ref 65–99)
HBA1C MFR BLD: 6 %
HCG SERPL QL: NEGATIVE
HCT VFR BLD AUTO: 46.5 % (ref 34.8–46.1)
HDLC SERPL-MCNC: 41 MG/DL
HGB BLD-MCNC: 15.3 G/DL (ref 11.5–15.4)
HIV 1+2 AB+HIV1 P24 AG SERPL QL IA: NORMAL
HIV 2 AB SERPL QL IA: NORMAL
HIV1 AB SERPL QL IA: NORMAL
HIV1 P24 AG SERPL QL IA: NORMAL
IGA SERPL-MCNC: 297 MG/DL (ref 66–433)
IGG SERPL-MCNC: 907 MG/DL (ref 635–1741)
IGM SERPL-MCNC: 110 MG/DL (ref 45–281)
IMM GRANULOCYTES # BLD AUTO: 0.05 THOUSAND/UL (ref 0–0.2)
IMM GRANULOCYTES NFR BLD AUTO: 1 % (ref 0–2)
LDLC SERPL CALC-MCNC: 115 MG/DL (ref 0–100)
LYMPHOCYTES # BLD AUTO: 2.12 THOUSANDS/ÂΜL (ref 0.6–4.47)
LYMPHOCYTES NFR BLD AUTO: 23 % (ref 14–44)
MCH RBC QN AUTO: 29.6 PG (ref 26.8–34.3)
MCHC RBC AUTO-ENTMCNC: 32.9 G/DL (ref 31.4–37.4)
MCV RBC AUTO: 90 FL (ref 82–98)
MONOCYTES # BLD AUTO: 0.69 THOUSAND/ÂΜL (ref 0.17–1.22)
MONOCYTES NFR BLD AUTO: 8 % (ref 4–12)
NEUTROPHILS # BLD AUTO: 5.98 THOUSANDS/ÂΜL (ref 1.85–7.62)
NEUTS SEG NFR BLD AUTO: 65 % (ref 43–75)
NONHDLC SERPL-MCNC: 148 MG/DL
NRBC BLD AUTO-RTO: 0 /100 WBCS
PLATELET # BLD AUTO: 306 THOUSANDS/UL (ref 149–390)
PMV BLD AUTO: 9.6 FL (ref 8.9–12.7)
POTASSIUM SERPL-SCNC: 4.1 MMOL/L (ref 3.5–5.3)
PROT SERPL-MCNC: 7.8 G/DL (ref 6.4–8.4)
RBC # BLD AUTO: 5.17 MILLION/UL (ref 3.81–5.12)
SODIUM SERPL-SCNC: 142 MMOL/L (ref 135–147)
TRIGL SERPL-MCNC: 167 MG/DL (ref ?–150)
TSH SERPL DL<=0.05 MIU/L-ACNC: 2.81 UIU/ML (ref 0.45–4.5)
WBC # BLD AUTO: 9.13 THOUSAND/UL (ref 4.31–10.16)

## 2024-11-15 PROCEDURE — 80053 COMPREHEN METABOLIC PANEL: CPT

## 2024-11-15 PROCEDURE — 80061 LIPID PANEL: CPT

## 2024-11-15 PROCEDURE — 84443 ASSAY THYROID STIM HORMONE: CPT

## 2024-11-15 PROCEDURE — 86480 TB TEST CELL IMMUN MEASURE: CPT

## 2024-11-15 PROCEDURE — 36415 COLL VENOUS BLD VENIPUNCTURE: CPT

## 2024-11-15 PROCEDURE — 85025 COMPLETE CBC W/AUTO DIFF WBC: CPT

## 2024-11-15 PROCEDURE — 83036 HEMOGLOBIN GLYCOSYLATED A1C: CPT

## 2024-11-15 PROCEDURE — 82784 ASSAY IGA/IGD/IGG/IGM EACH: CPT

## 2024-11-15 PROCEDURE — 87389 HIV-1 AG W/HIV-1&-2 AB AG IA: CPT

## 2024-11-15 PROCEDURE — 84703 CHORIONIC GONADOTROPIN ASSAY: CPT

## 2024-11-16 LAB
GAMMA INTERFERON BACKGROUND BLD IA-ACNC: <0 IU/ML
M TB IFN-G BLD-IMP: NEGATIVE
M TB IFN-G CD4+ BCKGRND COR BLD-ACNC: 0 IU/ML
M TB IFN-G CD4+ BCKGRND COR BLD-ACNC: 0.08 IU/ML
MITOGEN IGNF BCKGRD COR BLD-ACNC: 10 IU/ML

## 2024-11-22 ENCOUNTER — HOSPITAL ENCOUNTER (OUTPATIENT)
Dept: INFUSION CENTER | Facility: CLINIC | Age: 34
End: 2024-11-22
Payer: COMMERCIAL

## 2024-11-22 VITALS
DIASTOLIC BLOOD PRESSURE: 70 MMHG | TEMPERATURE: 98.3 F | RESPIRATION RATE: 17 BRPM | HEART RATE: 78 BPM | SYSTOLIC BLOOD PRESSURE: 133 MMHG

## 2024-11-22 DIAGNOSIS — G35 MS (MULTIPLE SCLEROSIS) (HCC): Primary | ICD-10-CM

## 2024-11-22 PROCEDURE — 96413 CHEMO IV INFUSION 1 HR: CPT

## 2024-11-22 PROCEDURE — 96367 TX/PROPH/DG ADDL SEQ IV INF: CPT

## 2024-11-22 PROCEDURE — 96376 TX/PRO/DX INJ SAME DRUG ADON: CPT

## 2024-11-22 PROCEDURE — 96415 CHEMO IV INFUSION ADDL HR: CPT

## 2024-11-22 PROCEDURE — 96375 TX/PRO/DX INJ NEW DRUG ADDON: CPT

## 2024-11-22 PROCEDURE — 96361 HYDRATE IV INFUSION ADD-ON: CPT

## 2024-11-22 RX ORDER — HYDROCORTISONE SODIUM SUCCINATE 100 MG/2ML
100 INJECTION INTRAMUSCULAR; INTRAVENOUS ONCE
Status: COMPLETED | OUTPATIENT
Start: 2024-11-22 | End: 2024-11-22

## 2024-11-22 RX ORDER — ACETAMINOPHEN 325 MG/1
650 TABLET ORAL ONCE
OUTPATIENT
Start: 2024-11-29

## 2024-11-22 RX ORDER — FAMOTIDINE 10 MG/ML
20 INJECTION, SOLUTION INTRAVENOUS ONCE
Status: COMPLETED | OUTPATIENT
Start: 2024-11-22 | End: 2024-11-22

## 2024-11-22 RX ORDER — SODIUM CHLORIDE 9 MG/ML
20 INJECTION, SOLUTION INTRAVENOUS ONCE
Status: COMPLETED | OUTPATIENT
Start: 2024-11-22 | End: 2024-11-22

## 2024-11-22 RX ORDER — SODIUM CHLORIDE 9 MG/ML
20 INJECTION, SOLUTION INTRAVENOUS ONCE
OUTPATIENT
Start: 2024-11-29

## 2024-11-22 RX ORDER — HYDROCORTISONE SODIUM SUCCINATE 100 MG/2ML
100 INJECTION INTRAMUSCULAR; INTRAVENOUS ONCE
OUTPATIENT
Start: 2024-11-29 | End: 2024-11-29

## 2024-11-22 RX ORDER — DIPHENHYDRAMINE HYDROCHLORIDE 50 MG/ML
25 INJECTION INTRAMUSCULAR; INTRAVENOUS
Status: COMPLETED | OUTPATIENT
Start: 2024-11-22 | End: 2024-11-22

## 2024-11-22 RX ORDER — HYDROCORTISONE SODIUM SUCCINATE 100 MG/2ML
50 INJECTION INTRAMUSCULAR; INTRAVENOUS
Status: COMPLETED | OUTPATIENT
Start: 2024-11-22 | End: 2024-11-22

## 2024-11-22 RX ORDER — ACETAMINOPHEN 325 MG/1
650 TABLET ORAL ONCE
Status: COMPLETED | OUTPATIENT
Start: 2024-11-22 | End: 2024-11-22

## 2024-11-22 RX ADMIN — ACETAMINOPHEN 650 MG: 325 TABLET ORAL at 09:07

## 2024-11-22 RX ADMIN — HYDROCORTISONE SODIUM SUCCINATE 50 MG: 100 INJECTION, POWDER, FOR SOLUTION INTRAMUSCULAR; INTRAVENOUS at 12:24

## 2024-11-22 RX ADMIN — DIPHENHYDRAMINE HYDROCHLORIDE 25 MG: 50 INJECTION, SOLUTION INTRAMUSCULAR; INTRAVENOUS at 12:24

## 2024-11-22 RX ADMIN — SODIUM CHLORIDE 20 ML/HR: 0.9 INJECTION, SOLUTION INTRAVENOUS at 08:18

## 2024-11-22 RX ADMIN — HYDROCORTISONE SODIUM SUCCINATE 100 MG: 100 INJECTION, POWDER, FOR SOLUTION INTRAMUSCULAR; INTRAVENOUS at 09:07

## 2024-11-22 RX ADMIN — FAMOTIDINE 20 MG: 10 INJECTION, SOLUTION INTRAVENOUS at 12:07

## 2024-11-22 RX ADMIN — DIPHENHYDRAMINE HYDROCHLORIDE 25 MG: 50 INJECTION, SOLUTION INTRAMUSCULAR; INTRAVENOUS at 12:05

## 2024-11-22 RX ADMIN — SODIUM CHLORIDE 125 MG: 0.9 INJECTION, SOLUTION INTRAVENOUS at 09:11

## 2024-11-22 RX ADMIN — SODIUM CHLORIDE 500 ML: 0.9 INJECTION, SOLUTION INTRAVENOUS at 12:09

## 2024-11-22 RX ADMIN — FAMOTIDINE 20 MG: 10 INJECTION, SOLUTION INTRAVENOUS at 08:21

## 2024-11-22 RX ADMIN — HYDROCORTISONE SODIUM SUCCINATE 50 MG: 100 INJECTION, POWDER, FOR SOLUTION INTRAMUSCULAR; INTRAVENOUS at 12:06

## 2024-11-22 RX ADMIN — OCRELIZUMAB 300 MG: 300 INJECTION INTRAVENOUS at 10:20

## 2024-11-22 RX ADMIN — DIPHENHYDRAMINE HYDROCHLORIDE 50 MG: 50 INJECTION, SOLUTION INTRAMUSCULAR; INTRAVENOUS at 08:44

## 2024-11-22 NOTE — PROGRESS NOTES
"Patient presents for Ocrevus offering no complaints, pt denies any recent infection or abx use.  Premeds given as ordered and Ocrevus infusion started.     During infusion (rate of 120ml/hr with 40.75ml remaining) pt reported to nursing staff that she felt like she had a \"lump\" in her throat and had facial and neck flushing. Ocrevus infusion immediately stopped, normal saline emergency line opened and hypersensitivity protocol initiated. Pt received 25mg benadryl, 50mg solu-cortef, and 20mg pepcid initially. After 10 minutes, Heath was still complaining of a \"lump in her throat\" sensation, so the 2nd doses of 25mg benadryl and 50mg solucortef were administered. I reached out Neurology RN and PA-C, per Kandace Cline PA-C, rechallenge pt with Ocrevus, starting at 90 ml/hr for 30 minutes and then titrate per protocol.     500ml saline bolus completed, pt restarted at 90ml/hr for 30 min and titrated per Ocrevus titration guidelines. Pt tolerated remainder of infusion without issue. 1 hour post observation completed, pt tolerated well. PIV removed and patient ambulated out of infusion department with steady gait, patient's partner with patient.      Per Christie WALKER- Neurology- she will address with Megan CHAMBERLAIN to see if she will add any additional premeds for future infusions on Monday when she is back in the office. AVS declined. Next appointment 12/6/24 at 0800 reviewed.   "

## 2024-11-26 ENCOUNTER — TELEPHONE (OUTPATIENT)
Dept: NEUROLOGY | Facility: CLINIC | Age: 34
End: 2024-11-26

## 2024-11-26 DIAGNOSIS — E28.2 POLYCYSTIC OVARIAN SYNDROME: ICD-10-CM

## 2024-11-26 DIAGNOSIS — E66.01 CLASS 2 SEVERE OBESITY DUE TO EXCESS CALORIES WITH SERIOUS COMORBIDITY AND BODY MASS INDEX (BMI) OF 39.0 TO 39.9 IN ADULT (HCC): Primary | ICD-10-CM

## 2024-11-26 DIAGNOSIS — E66.812 CLASS 2 SEVERE OBESITY DUE TO EXCESS CALORIES WITH SERIOUS COMORBIDITY AND BODY MASS INDEX (BMI) OF 39.0 TO 39.9 IN ADULT (HCC): Primary | ICD-10-CM

## 2024-11-26 DIAGNOSIS — R73.03 PREDIABETES: ICD-10-CM

## 2024-11-26 RX ORDER — HYDROCORTISONE SODIUM SUCCINATE 100 MG/2ML
150 INJECTION INTRAMUSCULAR; INTRAVENOUS ONCE
Status: CANCELLED | OUTPATIENT
Start: 2024-12-06 | End: 2024-11-29

## 2024-11-26 RX ORDER — TIRZEPATIDE 5 MG/.5ML
5 INJECTION, SOLUTION SUBCUTANEOUS WEEKLY
Qty: 2 ML | Refills: 1 | Status: SHIPPED | OUTPATIENT
Start: 2024-11-26 | End: 2025-01-21

## 2024-11-26 NOTE — TELEPHONE ENCOUNTER
Pt is scheduled for 2nd Ocrevus on 12/6/24.     [facility]us [facility]24 to 1/5/25 for 2 visits at [facility]. Auth # [id number].     CBC 11/15/24  CMP 11/15/24  HCG 11/15/24  Immunoglobulins 11/15/24  HIV 11/15/24  Quantiferon TB gold 11/15/24  Acute hepatitis panel 10/26/24    No changes since first infusion. See infusion notes regarding reaction during first dose. Premeds adjusted.     Okay to proceed with 2nd Ocrevus infusion on 12/6/24 with current lab results on file?

## 2024-11-27 ENCOUNTER — TELEPHONE (OUTPATIENT)
Dept: RADIOLOGY | Facility: HOSPITAL | Age: 34
End: 2024-11-27

## 2024-11-27 ENCOUNTER — TELEPHONE (OUTPATIENT)
Dept: NEUROSURGERY | Facility: CLINIC | Age: 34
End: 2024-11-27

## 2024-11-27 PROBLEM — R93.7 ABNORMAL MRI, THORACIC SPINE: Status: ACTIVE | Noted: 2024-11-27

## 2024-11-27 NOTE — ASSESSMENT & PLAN NOTE
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 No radiating pain, BBI, weakness    Imaging:  CT myelogram 11/6/24: Myelogram images demonstrate a finding unchanged from recent MRI of the thoracic spine. There is a focal flattening of the posterior aspect of the thoracic cord at the level of T4-T5. No intradural/extra medullary mass is identified with normal opacification of the CSF space after intrathecal contrast administration.. This likely represents a thoracic arachnoid web.  MRI c/t spine 10/19/24: Normal cervical spinal cord. No demyelinating lesions identified. No demyelinating lesions noted involving the thoracic spinal cord. Abrupt indentation involving the dorsal aspect of the thoracic spinal cord at the level of T4, without abnormal signal intensity noted in this region or enhancing lesion noted in this region. Given the abrupt indentation, this likely represents a dorsal thoracic arachnoid web, with alternative but less likely considerations including an arachnoid cyst, or ventral spinal cord herniation. Recommend further evaluation with CT myelogram of the thoracic spine. Mild cervical and thoracic spondylosis without central canal or high-grade neural foraminal stenosis noted.    Plan:  Reviewed images with patient and wife. Suspected arachnoid web seen.  This was also reported on MRI from 2016, though patient was unaware.  Unlikely that this is causing her mid-low back pain.  Thankfully she is not having any focal neurologic deficits.  Discussed that an arachnoid web is a thickening of the arachnoid membrane which is a tissue layer that surrounds the spinal cord.  This could potentially cause impingement or compression/distortion on the spinal cord.  If severe, this could potentially cause neurologic issues such as numbness, weakness, etc.  Thankfully  there is no abnormal signal change in the cord or enhancement.  No neurosurgical intervention (decompression) recommended.  Continue to follow with neurology  Recommend evaluation by pain management for possible injections.  There is no significant foraminal stenosis appreciated on current MRI.  Reviewed red flag signs and symptoms.    Since this finding was also noted back in 2016, though unfortunately no imaging available, would not necessarily continue routine surveillance especially in the absence of thoracic myelopathy symptoms. Will have [facility] push through images from 2016 for direct comparison. Since she will be getting periodic MRIs from an MS perspective, will not order any additional imaging.  If there are any concerns on follow-up MRIs, happy to see patient back for reevaluation.  Follow-up as needed.  Call with any questions or concerns.    Orders:    Ambulatory referral to Spine & Pain Management; Future

## 2024-11-29 ENCOUNTER — CONSULT (OUTPATIENT)
Dept: NEUROSURGERY | Facility: CLINIC | Age: 34
End: 2024-11-29
Payer: COMMERCIAL

## 2024-11-29 ENCOUNTER — APPOINTMENT (OUTPATIENT)
Dept: LAB | Facility: MEDICAL CENTER | Age: 34
End: 2024-11-29
Payer: COMMERCIAL

## 2024-11-29 VITALS
BODY MASS INDEX: 39.27 KG/M2 | OXYGEN SATURATION: 96 % | SYSTOLIC BLOOD PRESSURE: 126 MMHG | HEIGHT: 64 IN | WEIGHT: 230 LBS | DIASTOLIC BLOOD PRESSURE: 56 MMHG | HEART RATE: 78 BPM | TEMPERATURE: 98.2 F | RESPIRATION RATE: 13 BRPM

## 2024-11-29 DIAGNOSIS — G35 MS (MULTIPLE SCLEROSIS) (HCC): ICD-10-CM

## 2024-11-29 DIAGNOSIS — M53.9 DISORDER OF THORACIC SPINE: ICD-10-CM

## 2024-11-29 DIAGNOSIS — R93.7 ABNORMAL MRI, THORACIC SPINE: ICD-10-CM

## 2024-11-29 LAB
ALBUMIN SERPL BCG-MCNC: 4.4 G/DL (ref 3.5–5)
ALP SERPL-CCNC: 68 U/L (ref 34–104)
ALT SERPL W P-5'-P-CCNC: 29 U/L (ref 7–52)
ANION GAP SERPL CALCULATED.3IONS-SCNC: 7 MMOL/L (ref 4–13)
AST SERPL W P-5'-P-CCNC: 19 U/L (ref 13–39)
BASOPHILS # BLD AUTO: 0.07 THOUSANDS/ΜL (ref 0–0.1)
BASOPHILS NFR BLD AUTO: 1 % (ref 0–1)
BILIRUB SERPL-MCNC: 0.36 MG/DL (ref 0.2–1)
BUN SERPL-MCNC: 14 MG/DL (ref 5–25)
CALCIUM SERPL-MCNC: 9.1 MG/DL (ref 8.4–10.2)
CHLORIDE SERPL-SCNC: 104 MMOL/L (ref 96–108)
CO2 SERPL-SCNC: 27 MMOL/L (ref 21–32)
CREAT SERPL-MCNC: 0.89 MG/DL (ref 0.6–1.3)
EOSINOPHIL # BLD AUTO: 0.27 THOUSAND/ΜL (ref 0–0.61)
EOSINOPHIL NFR BLD AUTO: 3 % (ref 0–6)
ERYTHROCYTE [DISTWIDTH] IN BLOOD BY AUTOMATED COUNT: 12.7 % (ref 11.6–15.1)
GFR SERPL CREATININE-BSD FRML MDRD: 83 ML/MIN/1.73SQ M
GLUCOSE P FAST SERPL-MCNC: 82 MG/DL (ref 65–99)
HCG SERPL QL: NEGATIVE
HCT VFR BLD AUTO: 48.6 % (ref 34.8–46.1)
HGB BLD-MCNC: 16 G/DL (ref 11.5–15.4)
IMM GRANULOCYTES # BLD AUTO: 0.02 THOUSAND/UL (ref 0–0.2)
IMM GRANULOCYTES NFR BLD AUTO: 0 % (ref 0–2)
LYMPHOCYTES # BLD AUTO: 1.89 THOUSANDS/ΜL (ref 0.6–4.47)
LYMPHOCYTES NFR BLD AUTO: 20 % (ref 14–44)
MCH RBC QN AUTO: 29.5 PG (ref 26.8–34.3)
MCHC RBC AUTO-ENTMCNC: 32.9 G/DL (ref 31.4–37.4)
MCV RBC AUTO: 90 FL (ref 82–98)
MONOCYTES # BLD AUTO: 0.7 THOUSAND/ΜL (ref 0.17–1.22)
MONOCYTES NFR BLD AUTO: 8 % (ref 4–12)
NEUTROPHILS # BLD AUTO: 6.42 THOUSANDS/ΜL (ref 1.85–7.62)
NEUTS SEG NFR BLD AUTO: 68 % (ref 43–75)
NRBC BLD AUTO-RTO: 0 /100 WBCS
PLATELET # BLD AUTO: 316 THOUSANDS/UL (ref 149–390)
PMV BLD AUTO: 9.8 FL (ref 8.9–12.7)
POTASSIUM SERPL-SCNC: 4.3 MMOL/L (ref 3.5–5.3)
PROT SERPL-MCNC: 7.1 G/DL (ref 6.4–8.4)
RBC # BLD AUTO: 5.43 MILLION/UL (ref 3.81–5.12)
SODIUM SERPL-SCNC: 138 MMOL/L (ref 135–147)
WBC # BLD AUTO: 9.37 THOUSAND/UL (ref 4.31–10.16)

## 2024-11-29 PROCEDURE — 85025 COMPLETE CBC W/AUTO DIFF WBC: CPT

## 2024-11-29 PROCEDURE — 36415 COLL VENOUS BLD VENIPUNCTURE: CPT

## 2024-11-29 PROCEDURE — 84703 CHORIONIC GONADOTROPIN ASSAY: CPT

## 2024-11-29 PROCEDURE — 99204 OFFICE O/P NEW MOD 45 MIN: CPT | Performed by: PHYSICIAN ASSISTANT

## 2024-11-29 PROCEDURE — 80053 COMPREHEN METABOLIC PANEL: CPT

## 2024-11-29 NOTE — PROGRESS NOTES
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 No radiating pain, BBI, weakness    Imaging:  CT myelogram 24: Myelogram images demonstrate a finding unchanged from recent MRI of the thoracic spine. There is a focal flattening of the posterior aspect of the thoracic cord at the level of T4-T5. No intradural/extra medullary mass is identified with normal opacification of the CSF space after intrathecal contrast administration.. This likely represents a thoracic arachnoid web.  MRI c/t spine 10/19/24: Normal cervical spinal cord. No demyelinating lesions identified. No demyelinating lesions noted involving the thoracic spinal cord. Abrupt indentation involving the dorsal aspect of the thoracic spinal cord at the level of T4, without abnormal signal intensity noted in this region or enhancing lesion noted in this region. Given the abrupt indentation, this likely represents a dorsal thoracic arachnoid web, with alternative but less likely considerations including an arachnoid cyst, or ventral spinal cord herniation. Recommend further evaluation with CT myelogram of the thoracic spine. Mild cervical and thoracic spondylosis without central canal or high-grade neural foraminal stenosis noted.    Plan:  Reviewed images with patient and wife. Suspected arachnoid web seen.  This was also reported on MRI from 2016, though patient was unaware.  Unlikely that this is causing her mid-low back pain.  Thankfully she is not having any focal neurologic deficits.  Discussed that an arachnoid web is a thickening of the arachnoid  membrane which is a tissue layer that surrounds the spinal cord.  This could potentially cause impingement or compression/distortion on the spinal cord.  If severe, this could potentially cause neurologic issues such as numbness, weakness, etc.  Thankfully there is no abnormal signal change in the cord or enhancement.  No neurosurgical intervention (decompression) recommended.  Continue to follow with neurology  Recommend evaluation by pain management for possible injections.  There is no significant foraminal stenosis appreciated on current MRI.  Reviewed red flag signs and symptoms.    Since this finding was also noted back in 2016, though unfortunately no imaging available, would not necessarily continue routine surveillance especially in the absence of thoracic myelopathy symptoms. Will have [facility] push through images from 2016 for direct comparison. Since she will be getting periodic MRIs from an MS perspective, will not order any additional imaging.  If there are any concerns on follow-up MRIs, happy to see patient back for reevaluation.  Follow-up as needed.  Call with any questions or concerns.    Orders:    Ambulatory referral to Spine & Pain Management; Future    Disorder of thoracic spine    Orders:    Ambulatory referral to Neurosurgery    Ambulatory referral to Spine & Pain Management; Future        History of Present Illness     34 year old female seen for evaluation of back pain. This has been going on for several years, but has gotten worse in the last year. The pain in the mid-low back, sometimes it will radiate to the low back. It mostly stays in the center of the mid-low back. It is a 6-7/10. She has some left rib/flank pain. Sitting/slouching can help, but standing causes the most pain. Using heating pads, ibuprofen, THC roller ball. If she sits too long she will having numbness and tingling in her legs and feet. No BBI. Last fall was a several months ago. Hx of MS and follows with neurology.        Review of Systems   Constitutional:  Negative for fatigue.   HENT:  Negative for trouble swallowing.    Gastrointestinal: Negative.    Genitourinary: Negative.    Musculoskeletal:  Positive for back pain (mid to lbp does not radiate) and gait problem (occ unsteady-last august). Negative for myalgias.   Neurological:  Positive for weakness (generalized) and numbness (b/l hands).   Hematological:  Does not bruise/bleed easily.   Psychiatric/Behavioral:  Positive for sleep disturbance.     I have personally reviewed the MA's review of systems and made changes as necessary.    Past Medical History   Past Medical History:   Diagnosis Date    Acute pain of left knee 05/11/2021    Migraine     MS (multiple sclerosis) (HCC)     PCOS (polycystic ovarian syndrome)     Thyroid disease      Past Surgical History:   Procedure Laterality Date    COSMETIC SURGERY  2016    Calcaneous fracture    FL MYELOGRAM THORACIC  11/6/2024    IR SPINE PROCEDURE       Family History   Problem Relation Age of Onset    Diabetes Mother     Lupus Mother     Hypertension Mother     Cancer Maternal Grandmother     Heart failure Paternal Grandfather     Stroke Maternal Aunt     Stroke Maternal Aunt     Stroke Maternal Aunt     Breast cancer Neg Hx     Colon cancer Neg Hx     Ovarian cancer Neg Hx     Uterine cancer Neg Hx     Cervical cancer Neg Hx       reports that she has been smoking cigarettes. She started smoking about 20 years ago. She has a 20.9 pack-year smoking history. She has never used smokeless tobacco. She reports that she does not currently use alcohol. She reports current drug use. Drug: Marijuana.  Current Outpatient Medications on File Prior to Visit   Medication Sig Dispense Refill    ciclopirox (LOPROX) 0.77 % cream Apply to affected area two times daily 90 g 0    fluticasone (FLONASE) 50 mcg/act nasal spray       ibuprofen (MOTRIN) 800 mg tablet Take 1 tablet (800 mg total) by mouth every 8 (eight) hours as needed for mild  pain 60 tablet 6    ketorolac (TORADOL) 10 mg tablet Take 1 tablet at the onset of a migraine headache 10 tablet 2    levothyroxine 50 mcg tablet Take 1 tablet (50 mcg total) by mouth daily 90 tablet 1    metFORMIN (GLUCOPHAGE-XR) 500 mg 24 hr tablet TAKE TWO TABLETS BY MOUTH TWICE DAILY WITH MEALS 360 tablet 0    tirzepatide (Zepbound) 5 mg/0.5 mL auto-injector Inject 0.5 mL (5 mg total) under the skin once a week 2 mL 1     No current facility-administered medications on file prior to visit.   No Known Allergies   Past Medical History   Past Medical History:   Diagnosis Date    Acute pain of left knee 05/11/2021    Migraine     MS (multiple sclerosis) (HCC)     PCOS (polycystic ovarian syndrome)     Thyroid disease      Past Surgical History:   Procedure Laterality Date    COSMETIC SURGERY  2016    Calcaneous fracture    FL MYELOGRAM THORACIC  11/6/2024    IR SPINE PROCEDURE       Family History   Problem Relation Age of Onset    Diabetes Mother     Lupus Mother     Hypertension Mother     Cancer Maternal Grandmother     Heart failure Paternal Grandfather     Stroke Maternal Aunt     Stroke Maternal Aunt     Stroke Maternal Aunt     Breast cancer Neg Hx     Colon cancer Neg Hx     Ovarian cancer Neg Hx     Uterine cancer Neg Hx     Cervical cancer Neg Hx       reports that she has been smoking cigarettes. She started smoking about 20 years ago. She has a 20.9 pack-year smoking history. She has never used smokeless tobacco. She reports that she does not currently use alcohol. She reports current drug use. Drug: Marijuana.  Current Outpatient Medications on File Prior to Visit   Medication Sig Dispense Refill    ciclopirox (LOPROX) 0.77 % cream Apply to affected area two times daily 90 g 0    fluticasone (FLONASE) 50 mcg/act nasal spray       ibuprofen (MOTRIN) 800 mg tablet Take 1 tablet (800 mg total) by mouth every 8 (eight) hours as needed for mild pain 60 tablet 6    ketorolac (TORADOL) 10 mg tablet Take 1  "tablet at the onset of a migraine headache 10 tablet 2    levothyroxine 50 mcg tablet Take 1 tablet (50 mcg total) by mouth daily 90 tablet 1    metFORMIN (GLUCOPHAGE-XR) 500 mg 24 hr tablet TAKE TWO TABLETS BY MOUTH TWICE DAILY WITH MEALS 360 tablet 0    tirzepatide (Zepbound) 5 mg/0.5 mL auto-injector Inject 0.5 mL (5 mg total) under the skin once a week 2 mL 1     No current facility-administered medications on file prior to visit.   No Known Allergies   Social History     Tobacco Use    Smoking status: Every Day     Current packs/day: 1.00     Average packs/day: 1 pack/day for 20.9 years (20.9 ttl pk-yrs)     Types: Cigarettes     Start date: 2004    Smokeless tobacco: Never    Tobacco comments:     Pt states smokes 1 pk a day   Vaping Use    Vaping status: Never Used   Substance and Sexual Activity    Alcohol use: Not Currently    Drug use: Yes     Types: Marijuana     Comment: med smoke and edible    Sexual activity: Yes     Partners: Female     Birth control/protection: None        Objective   /56 (BP Location: Left arm, Patient Position: Sitting, Cuff Size: Standard)   Pulse 78   Temp 98.2 °F (36.8 °C) (Temporal)   Resp 13   Ht 5' 4\" (1.626 m)   Wt 104 kg (230 lb)   SpO2 96%   BMI 39.48 kg/m²     Physical Exam  Vitals reviewed.   Constitutional:       General: She is awake.      Appearance: Normal appearance.   HENT:      Head: Normocephalic and atraumatic.   Eyes:      Conjunctiva/sclera: Conjunctivae normal.   Cardiovascular:      Rate and Rhythm: Normal rate.   Pulmonary:      Effort: Pulmonary effort is normal.   Musculoskeletal:      Comments: No midline spinal TTP   Skin:     General: Skin is warm and dry.   Neurological:      Mental Status: She is alert and oriented to person, place, and time.      Motor: Motor strength is normal.     Gait: Gait is intact.      Deep Tendon Reflexes:      Reflex Scores:       Bicep reflexes are 2+ on the right side and 2+ on the left side.       " Brachioradialis reflexes are 2+ on the right side and 2+ on the left side.       Patellar reflexes are 2+ on the right side and 2+ on the left side.  Psychiatric:         Attention and Perception: Attention and perception normal.         Mood and Affect: Mood and affect normal.         Speech: Speech normal.         Behavior: Behavior normal. Behavior is cooperative.         Thought Content: Thought content normal.         Cognition and Memory: Cognition and memory normal.         Judgment: Judgment normal.       Neurologic Exam     Mental Status   Oriented to person, place, and time.   Follows 2 step commands.   Attention: normal. Concentration: normal.   Speech: speech is normal   Level of consciousness: alert  Knowledge: good.   Normal comprehension.     Motor Exam   Muscle bulk: normal  Overall muscle tone: normal    Strength   Strength 5/5 throughout.     Sensory Exam   Light touch normal.     Gait, Coordination, and Reflexes     Gait  Gait: normal    Tremor   Resting tremor: absent  Intention tremor: absent  Action tremor: absent    Reflexes   Right brachioradialis: 2+  Left brachioradialis: 2+  Right biceps: 2+  Left biceps: 2+  Right patellar: 2+  Left patellar: 2+  Right Shah: absent  Left Shah: absent  Right ankle clonus: absent  Left ankle clonus: absent

## 2024-12-06 ENCOUNTER — HOSPITAL ENCOUNTER (OUTPATIENT)
Dept: INFUSION CENTER | Facility: CLINIC | Age: 34
End: 2024-12-06
Payer: COMMERCIAL

## 2024-12-06 VITALS
HEART RATE: 74 BPM | DIASTOLIC BLOOD PRESSURE: 76 MMHG | SYSTOLIC BLOOD PRESSURE: 137 MMHG | RESPIRATION RATE: 18 BRPM | TEMPERATURE: 98 F

## 2024-12-06 DIAGNOSIS — G35 MS (MULTIPLE SCLEROSIS) (HCC): Primary | ICD-10-CM

## 2024-12-06 PROCEDURE — 96375 TX/PRO/DX INJ NEW DRUG ADDON: CPT

## 2024-12-06 PROCEDURE — 96413 CHEMO IV INFUSION 1 HR: CPT

## 2024-12-06 PROCEDURE — 96367 TX/PROPH/DG ADDL SEQ IV INF: CPT

## 2024-12-06 PROCEDURE — 96415 CHEMO IV INFUSION ADDL HR: CPT

## 2024-12-06 RX ORDER — HYDROCORTISONE SODIUM SUCCINATE 100 MG/2ML
150 INJECTION INTRAMUSCULAR; INTRAVENOUS ONCE
Status: CANCELLED | OUTPATIENT
Start: 2025-05-21 | End: 2025-05-21

## 2024-12-06 RX ORDER — SODIUM CHLORIDE 9 MG/ML
20 INJECTION, SOLUTION INTRAVENOUS ONCE
OUTPATIENT
Start: 2025-05-21

## 2024-12-06 RX ORDER — HYDROCORTISONE SODIUM SUCCINATE 100 MG/2ML
150 INJECTION INTRAMUSCULAR; INTRAVENOUS ONCE
Status: COMPLETED | OUTPATIENT
Start: 2024-12-06 | End: 2024-12-06

## 2024-12-06 RX ORDER — SODIUM CHLORIDE 9 MG/ML
20 INJECTION, SOLUTION INTRAVENOUS ONCE
Status: COMPLETED | OUTPATIENT
Start: 2024-12-06 | End: 2024-12-06

## 2024-12-06 RX ORDER — ACETAMINOPHEN 325 MG/1
650 TABLET ORAL ONCE
Status: COMPLETED | OUTPATIENT
Start: 2024-12-06 | End: 2024-12-06

## 2024-12-06 RX ORDER — ACETAMINOPHEN 325 MG/1
650 TABLET ORAL ONCE
Status: CANCELLED | OUTPATIENT
Start: 2025-05-21

## 2024-12-06 RX ORDER — SODIUM CHLORIDE 9 MG/ML
20 INJECTION, SOLUTION INTRAVENOUS ONCE
Status: CANCELLED | OUTPATIENT
Start: 2025-05-21

## 2024-12-06 RX ORDER — ACETAMINOPHEN 325 MG/1
650 TABLET ORAL ONCE
OUTPATIENT
Start: 2025-05-21

## 2024-12-06 RX ADMIN — SODIUM CHLORIDE 20 ML/HR: 9 INJECTION, SOLUTION INTRAVENOUS at 08:44

## 2024-12-06 RX ADMIN — HYDROCORTISONE SODIUM SUCCINATE 150 MG: 100 INJECTION, POWDER, FOR SOLUTION INTRAMUSCULAR; INTRAVENOUS at 09:30

## 2024-12-06 RX ADMIN — ACETAMINOPHEN 650 MG: 325 TABLET, FILM COATED ORAL at 09:29

## 2024-12-06 RX ADMIN — SODIUM CHLORIDE 125 MG: 0.9 INJECTION, SOLUTION INTRAVENOUS at 09:28

## 2024-12-06 RX ADMIN — OCRELIZUMAB 300 MG: 300 INJECTION INTRAVENOUS at 10:19

## 2024-12-06 RX ADMIN — FAMOTIDINE 20 MG: 10 INJECTION, SOLUTION INTRAVENOUS at 08:44

## 2024-12-06 RX ADMIN — DIPHENHYDRAMINE HYDROCHLORIDE 50 MG: 50 INJECTION, SOLUTION INTRAMUSCULAR; INTRAVENOUS at 09:06

## 2024-12-06 NOTE — PROGRESS NOTES
Tylenol 650 mg premedication clarified with Esperanza Abdul RN, as per provider, give 30 minutes prior to starting Ocrevus infusion.

## 2024-12-06 NOTE — PROGRESS NOTES
Patient to clinic for Ocrevus. Offers no complaints at this time.   Tolerated infusion and 1 hr post observation without complications. PIV removed.  Aware of next appointment on 5/22/24 at 8 am. AVS declined.

## 2024-12-09 ENCOUNTER — TELEMEDICINE (OUTPATIENT)
Dept: BARIATRICS | Facility: CLINIC | Age: 34
End: 2024-12-09
Payer: COMMERCIAL

## 2024-12-09 VITALS — HEIGHT: 64 IN | BODY MASS INDEX: 37.81 KG/M2 | WEIGHT: 221.5 LBS

## 2024-12-09 DIAGNOSIS — E66.812 CLASS 2 SEVERE OBESITY DUE TO EXCESS CALORIES WITH SERIOUS COMORBIDITY AND BODY MASS INDEX (BMI) OF 38.0 TO 38.9 IN ADULT (HCC): Primary | ICD-10-CM

## 2024-12-09 DIAGNOSIS — E66.01 CLASS 2 SEVERE OBESITY DUE TO EXCESS CALORIES WITH SERIOUS COMORBIDITY AND BODY MASS INDEX (BMI) OF 38.0 TO 38.9 IN ADULT (HCC): Primary | ICD-10-CM

## 2024-12-09 DIAGNOSIS — R73.03 PREDIABETES: ICD-10-CM

## 2024-12-09 DIAGNOSIS — E28.2 POLYCYSTIC OVARIAN SYNDROME: ICD-10-CM

## 2024-12-09 DIAGNOSIS — E66.01 CLASS 2 SEVERE OBESITY DUE TO EXCESS CALORIES WITH SERIOUS COMORBIDITY AND BODY MASS INDEX (BMI) OF 39.0 TO 39.9 IN ADULT (HCC): ICD-10-CM

## 2024-12-09 DIAGNOSIS — E66.812 CLASS 2 SEVERE OBESITY DUE TO EXCESS CALORIES WITH SERIOUS COMORBIDITY AND BODY MASS INDEX (BMI) OF 39.0 TO 39.9 IN ADULT (HCC): ICD-10-CM

## 2024-12-09 PROCEDURE — 99214 OFFICE O/P EST MOD 30 MIN: CPT | Performed by: NURSE PRACTITIONER

## 2024-12-09 RX ORDER — TIRZEPATIDE 5 MG/.5ML
5 INJECTION, SOLUTION SUBCUTANEOUS WEEKLY
Start: 2024-12-09 | End: 2024-12-21 | Stop reason: DRUGHIGH

## 2024-12-09 NOTE — ASSESSMENT & PLAN NOTE
Orders:  •  tirzepatide (Zepbound) 5 mg/0.5 mL auto-injector; Inject 0.5 mL (5 mg total) under the skin once a week

## 2024-12-09 NOTE — PROGRESS NOTES
Virtual Regular Visit  Name: Heath Ballard      : 1990      MRN: [id number]  Encounter Provider: CINTIA Ramos  Encounter Date: 2024   Encounter department: [facility] [place]      Verification of patient location:  Patient is located at {Amb Virtual Patient Location:91965} in the following state in which I hold an active license {[facility] virtual patient location:[address]170} :Assessment & Plan  Class 2 severe obesity due to excess calories with serious comorbidity and body mass index (BMI) of 38.0 to 38.9 in adult (HCC)  - Patient is pursuing Conservative Program and follow up visits with medical weight management provider  - Initial weight loss goal of 5-10% weight loss for improved health. Weight loss can improve patient's co-morbid conditions and/or prevent weight-related complications.  - Explained the importance of continuing lifestyle changes in addition to any anti-obesity medications.   - Labs reviewed from 2024    General Recommendations:  Nutrition:  Eat breakfast daily.  Do not skip meals.      Food log (ie.) www.[employer].com, sparkpeople.com, loseit.com, calorieking.com, etc.     Practice mindful eating.  Be sure to set aside time to eat, eat slowly, and savor your food.     Hydration:    At least 64oz of water daily.  No sugar sweetened beverages.  No juice (eat the fruit instead).     Exercise:  Studies have shown that the ideal exercise goal is somewhere between 150 to 300 minutes of moderate intensity exercise a week.  Start with exercising 10 minutes every other day and gradually increase physical activity with a goal of at least 150 minutes of moderate intensity exercise a week, divided over at least 3 days a week.  An example of this would be exercising 30 minutes a day, 5 days a week.  Resistance training can increase muscle mass and increase our resting metabolic rate.   FULL BODY resistance training is recommended 2-3 times a week.  Do not do  this on consecutive days to allow for muscle recovery.     Aim for a bare minimum 5000 steps, even on days you do not exercise.     Monitoring:   Weigh yourself daily.  If this causes undue stress, then just weigh yourself once a week.  Weigh yourself the same time of the day with the same amount of clothing on.  Preferably this should be done after waking up, before you eat, and with no clothing or minimal clothing on.     Specific Goals:  Calorie goal:  7001-9696 azul/day  Patient lifestyle habits were reviewed and patient was congratulated on her weight loss.  Nutrition was discussed and she will continue to work with the dietitian.  She was advised to continue with smaller more frequent meals but to make sure she is logging her food to stay within her recommended calorie range.  Activity was discussed and she was encouraged to continue with her active routine at least 30 minutes 3-4 times a week.  Medications were discussed and patient will continue on Zepbound and advance to 7.5 mg with her next refill and 10 mg after that.  Will provide a 3-month prescription of the 10 mg to make sure she has enough medication as her insurance will be switching at that time to avoid any gaps in her therapy.           Class 2 severe obesity due to excess calories with serious comorbidity and body mass index (BMI) of 39.0 to 39.9 in adult (HCC)    Orders:  •  tirzepatide (Zepbound) 5 mg/0.5 mL auto-injector; Inject 0.5 mL (5 mg total) under the skin once a week    Polycystic ovarian syndrome    Orders:  •  tirzepatide (Zepbound) 5 mg/0.5 mL auto-injector; Inject 0.5 mL (5 mg total) under the skin once a week    Prediabetes    Orders:  •  tirzepatide (Zepbound) 5 mg/0.5 mL auto-injector; Inject 0.5 mL (5 mg total) under the skin once a week          Encounter provider CINTIA Ramos    The patient was identified by name and date of birth. Heath Ballard was informed that this is a telemedicine visit and that the visit is  "being conducted through {AMB VIRTUAL VISIT MEDIUM:30894}.  {Telemedicine confidentiality :17853} {Telemedicine participants:54792}  She acknowledged consent and understanding of privacy and security of the video platform. The patient has agreed to participate and understands they can discontinue the visit at any time.    Patient is aware this is a billable service.     History of Present Illness {?Quick Links Encounters * My Last Note * Last Note in Specialty * Snapshot * Since Last Visit * History :83318}    HPI  Review of Systems    Objective {?Quick Links Trend Vitals * Enter New Vitals * Results Review * Timeline (Adult) * Labs * Imaging * Cardiology * Procedures * Lung Cancer Screening * Surgical eConsent :88403}  Ht 5' 4\" (1.626 m)   Wt 100 kg (221 lb 8 oz)   LMP 11/29/2024 (Approximate)   BMI 38.02 kg/m²     Physical Exam    Visit Time  Total Visit Duration: ***  "

## 2024-12-09 NOTE — PROGRESS NOTES
Assessment/Plan:     Class 2 severe obesity due to excess calories with serious comorbidity and body mass index (BMI) of 38.0 to 38.9 in adult (HCC)  - Patient is pursuing Conservative Program and follow up visits with medical weight management provider  - Initial weight loss goal of 5-10% weight loss for improved health. Weight loss can improve patient's co-morbid conditions and/or prevent weight-related complications.  - Explained the importance of continuing lifestyle changes in addition to any anti-obesity medications.   - Labs reviewed from 11/2024    General Recommendations:  Nutrition:  Eat breakfast daily.  Do not skip meals.      Food log (ie.) www.tastytrade.com, sparkpeople.com, loseit.com, [employer].com, etc.     Practice mindful eating.  Be sure to set aside time to eat, eat slowly, and savor your food.     Hydration:    At least 64oz of water daily.  No sugar sweetened beverages.  No juice (eat the fruit instead).     Exercise:  Studies have shown that the ideal exercise goal is somewhere between 150 to 300 minutes of moderate intensity exercise a week.  Start with exercising 10 minutes every other day and gradually increase physical activity with a goal of at least 150 minutes of moderate intensity exercise a week, divided over at least 3 days a week.  An example of this would be exercising 30 minutes a day, 5 days a week.  Resistance training can increase muscle mass and increase our resting metabolic rate.   FULL BODY resistance training is recommended 2-3 times a week.  Do not do this on consecutive days to allow for muscle recovery.     Aim for a bare minimum 5000 steps, even on days you do not exercise.     Monitoring:   Weigh yourself daily.  If this causes undue stress, then just weigh yourself once a week.  Weigh yourself the same time of the day with the same amount of clothing on.  Preferably this should be done after waking up, before you eat, and with no clothing or minimal clothing  on.     Specific Goals:  Calorie goal:  3762-6763 azul/day  Patient lifestyle habits were reviewed and patient was congratulated on her weight loss.  Nutrition was discussed and she will continue to work with the dietitian.  She was advised to continue with smaller more frequent meals but to make sure she is logging her food to stay within her recommended calorie range.  Activity was discussed and she was encouraged to continue with her active routine at least 30 minutes 3-4 times a week.  Medications were discussed and patient will continue on Zepbound and advance to 7.5 mg with her next refill and 10 mg after that.  Will provide a 3-month prescription of the 10 mg to make sure she has enough medication as her insurance will be switching at that time to avoid any gaps in her therapy.         Heath was seen today for follow-up.    Diagnoses and all orders for this visit:    Class 2 severe obesity due to excess calories with serious comorbidity and body mass index (BMI) of 38.0 to 38.9 in adult ([facility])    Class 2 severe obesity due to excess calories with serious comorbidity and body mass index (BMI) of 39.0 to 39.9 in adult ([facility])  -     tirzepatide (Zepbound) 5 mg/0.5 mL auto-injector; Inject 0.5 mL (5 mg total) under the skin once a week    Polycystic ovarian syndrome  -     tirzepatide (Zepbound) 5 mg/0.5 mL auto-injector; Inject 0.5 mL (5 mg total) under the skin once a week    Prediabetes  -     tirzepatide (Zepbound) 5 mg/0.5 mL auto-injector; Inject 0.5 mL (5 mg total) under the skin once a week        Total time spent reviewing chart, interviewing patient, examining patient, discussing plan, answering all questions, and documentin minutes with >50% face-to-face time with the patient.    Follow up in approximately 3 months with Non-Surgical Physician/Advanced Practitioner.      Patient ID: Heath Ballard  is a 34 y.o. female with excess weight/obesity here to pursue weight management.  Patient is pursuing  Conservative Program.   Most recent notes and records were reviewed.    Telemedicine consent    Patient: Heath Ballard  Provider: CINTIA Ramos  Provider located at [facility]NA[facility] [address] [address] [phone]    The patient was identified by name and date of birth. Heath Ballard was informed that this is a telemedicine visit and that the visit is being conducted through the Epic Embedded platform. She agrees to proceed..  My office door was closed. No one else was in the room.  She acknowledged consent and understanding of privacy and security of the video platform. The patient has agreed to participate and understands they can discontinue the visit at any time. The patient is located in the [facility] which I do hold an active license.      Patient is aware this is a billable service.    HPI    Wt Readings from Last 20 Encounters:   12/09/24 100 kg (221 lb 8 oz)   11/29/24 104 kg (230 lb)   11/06/24 104 kg (230 lb)   10/18/24 105 kg (231 lb)   09/24/24 105 kg (231 lb)   09/13/24 102 kg (224 lb)   08/30/24 104 kg (228 lb 3.2 oz)   08/02/24 106 kg (234 lb 4.8 oz)   07/18/24 103 kg (227 lb 12.8 oz)   07/02/24 104 kg (230 lb 3.2 oz)   02/27/24 106 kg (234 lb)   12/05/23 110 kg (241 lb 9.6 oz)   09/11/23 102 kg (225 lb)   08/23/23 103 kg (227 lb 9.6 oz)   05/18/23 101 kg (223 lb)   04/12/23 101 kg (222 lb)   01/27/23 100 kg (221 lb 6.4 oz)   12/01/22 103 kg (227 lb 11.2 oz)   08/30/22 106 kg (233 lb)   08/10/22 102 kg (223 lb 12.8 oz)       Patient presents today to medical weight management office for follow up.  Patient has started on Zepbound since last office visit and is currently starting the 5 mg dose.  She is tolerating the medication with no side effects.  She thinks the medication has been helpful to get her appetite controlled and she is more mindful of her nutrition.  She is also more active by  walking on the treadmill regularly.  She is eating smaller more frequent meals throughout the day and is being mindful of her protein intake.  She continues to work with the dietitian.  Patient is hopeful about her current weight loss excess and would like to continue with the medication.  She may be switching insurance companies in the next year and will try to stay ahead of her authorizations to avoid any gaps in her therapy.      Weight loss medication and dose: Zepbound 5mg  Started weight and date: 230.2 lbs in 7/2024  Current weight: 221.5 lbs - stated (224 lbs at last OV)  Difference:-2.5 lbs  Goal weight: 150-160 lbs    Starting BMI: 39.51 in 7/2024  Current BMI: 38.02    Waist Measurements:  7/2024: 45.75 in  9/2024: 45.5 in    Nutrition Prescription  Calories:1200 - 1500  Protein:65 - 82  Fluid:64    Diet recall:  B: yogurt with granola and fruit OR protein shake  L: skips OR leftovers OR hotpocket  D: protein and starch and vegetable  S: recently stopped    Hydration: water, coffee with sugar  Alcohol: no  Smoking: yes - 1ppd  Exercise: walking 2-4 miles per day  Occupation: works at a [employer]  Sleep: ok      The following portions of the patient's history were reviewed and updated as appropriate: allergies, current medications, past family history, past medical history, past social history, past surgical history, and problem list.    Family History   Problem Relation Age of Onset    Diabetes Mother     Lupus Mother     Hypertension Mother     Cancer Maternal Grandmother     Heart failure Paternal Grandfather     Stroke Maternal Aunt     Stroke Maternal Aunt     Stroke Maternal Aunt     Breast cancer Neg Hx     Colon cancer Neg Hx     Ovarian cancer Neg Hx     Uterine cancer Neg Hx     Cervical cancer Neg Hx         Review of Systems   Constitutional:  Negative for fatigue.   HENT:  Negative for sore throat.    Respiratory:  Negative for cough and shortness of breath.    Cardiovascular:  Negative for  "chest pain, palpitations and leg swelling.   Gastrointestinal:  Negative for abdominal pain, constipation, diarrhea and nausea.   Genitourinary:  Negative for dysuria.   Musculoskeletal:  Negative for arthralgias and back pain.   Skin:  Negative for rash.   Neurological:  Negative for headaches.   Psychiatric/Behavioral:  Negative for dysphoric mood. The patient is not nervous/anxious.        Objective:  Ht 5' 4\" (1.626 m)   Wt 100 kg (221 lb 8 oz)   LMP 11/29/2024 (Approximate)   BMI 38.02 kg/m²     Physical Exam  Vitals and nursing note reviewed.   Constitutional:       Appearance: Normal appearance. She is obese.   HENT:      Head: Normocephalic.   Pulmonary:      Effort: Pulmonary effort is normal.   Neurological:      General: No focal deficit present.      Mental Status: She is alert and oriented to person, place, and time.   Psychiatric:         Mood and Affect: Mood normal.         Behavior: Behavior normal.         Thought Content: Thought content normal.         Judgment: Judgment normal.            Labs   Most recent labs reviewed   Lab Results   Component Value Date     (L) 03/16/2014    SODIUM 138 11/29/2024    K 4.3 11/29/2024     11/29/2024    CO2 27 11/29/2024    ANIONGAP 10 03/16/2014    AGAP 7 11/29/2024    BUN 14 11/29/2024    CREATININE 0.89 11/29/2024    GLUC 103 09/24/2024    GLUF 82 11/29/2024    CALCIUM 9.1 11/29/2024    AST 19 11/29/2024    ALT 29 11/29/2024    ALKPHOS 68 11/29/2024    TP 7.1 11/29/2024    TBILI 0.36 11/29/2024    EGFR 83 11/29/2024     Lab Results   Component Value Date    HGBA1C 6.0 (H) 11/15/2024     Lab Results   Component Value Date    CGI8RKXMMYXE 2.806 11/15/2024    TSH 0.74 12/11/2020     Lab Results   Component Value Date    CHOLESTEROL 189 11/15/2024     Lab Results   Component Value Date    HDL 41 (L) 11/15/2024     Lab Results   Component Value Date    TRIG 167 (H) 11/15/2024     Lab Results   Component Value Date    LDLCALC 115 (H) 11/15/2024     "

## 2024-12-09 NOTE — ASSESSMENT & PLAN NOTE
- Patient is pursuing Conservative Program and follow up visits with medical weight management provider  - Initial weight loss goal of 5-10% weight loss for improved health. Weight loss can improve patient's co-morbid conditions and/or prevent weight-related complications.  - Explained the importance of continuing lifestyle changes in addition to any anti-obesity medications.   - Labs reviewed from 11/2024    General Recommendations:  Nutrition:  Eat breakfast daily.  Do not skip meals.      Food log (ie.) www.[employer].com, sparkpeople.com, loseit.com, calorieking.com, etc.     Practice mindful eating.  Be sure to set aside time to eat, eat slowly, and savor your food.     Hydration:    At least 64oz of water daily.  No sugar sweetened beverages.  No juice (eat the fruit instead).     Exercise:  Studies have shown that the ideal exercise goal is somewhere between 150 to 300 minutes of moderate intensity exercise a week.  Start with exercising 10 minutes every other day and gradually increase physical activity with a goal of at least 150 minutes of moderate intensity exercise a week, divided over at least 3 days a week.  An example of this would be exercising 30 minutes a day, 5 days a week.  Resistance training can increase muscle mass and increase our resting metabolic rate.   FULL BODY resistance training is recommended 2-3 times a week.  Do not do this on consecutive days to allow for muscle recovery.     Aim for a bare minimum 5000 steps, even on days you do not exercise.     Monitoring:   Weigh yourself daily.  If this causes undue stress, then just weigh yourself once a week.  Weigh yourself the same time of the day with the same amount of clothing on.  Preferably this should be done after waking up, before you eat, and with no clothing or minimal clothing on.     Specific Goals:  Calorie goal:  9481-6485 azul/day  Patient lifestyle habits were reviewed and patient was congratulated on her weight loss.   Nutrition was discussed and she will continue to work with the dietitian.  She was advised to continue with smaller more frequent meals but to make sure she is logging her food to stay within her recommended calorie range.  Activity was discussed and she was encouraged to continue with her active routine at least 30 minutes 3-4 times a week.  Medications were discussed and patient will continue on Zepbound and advance to 7.5 mg with her next refill and 10 mg after that.  Will provide a 3-month prescription of the 10 mg to make sure she has enough medication as her insurance will be switching at that time to avoid any gaps in her therapy.

## 2024-12-10 ENCOUNTER — RA CDI HCC (OUTPATIENT)
Dept: OTHER | Facility: HOSPITAL | Age: 34
End: 2024-12-10

## 2024-12-16 ENCOUNTER — TELEPHONE (OUTPATIENT)
Dept: BARIATRICS | Facility: CLINIC | Age: 34
End: 2024-12-16

## 2024-12-17 ENCOUNTER — OFFICE VISIT (OUTPATIENT)
Dept: FAMILY MEDICINE CLINIC | Facility: CLINIC | Age: 34
End: 2024-12-17
Payer: COMMERCIAL

## 2024-12-17 VITALS
HEART RATE: 70 BPM | WEIGHT: 220 LBS | OXYGEN SATURATION: 98 % | SYSTOLIC BLOOD PRESSURE: 118 MMHG | TEMPERATURE: 97.9 F | BODY MASS INDEX: 37.56 KG/M2 | DIASTOLIC BLOOD PRESSURE: 78 MMHG | HEIGHT: 64 IN | RESPIRATION RATE: 18 BRPM

## 2024-12-17 DIAGNOSIS — K21.9 GASTROESOPHAGEAL REFLUX DISEASE WITHOUT ESOPHAGITIS: ICD-10-CM

## 2024-12-17 DIAGNOSIS — R73.03 PREDIABETES: ICD-10-CM

## 2024-12-17 DIAGNOSIS — E03.9 ACQUIRED HYPOTHYROIDISM: ICD-10-CM

## 2024-12-17 DIAGNOSIS — E28.2 POLYCYSTIC OVARIAN SYNDROME: Primary | ICD-10-CM

## 2024-12-17 DIAGNOSIS — G35 MS (MULTIPLE SCLEROSIS) (HCC): ICD-10-CM

## 2024-12-17 PROCEDURE — 99214 OFFICE O/P EST MOD 30 MIN: CPT | Performed by: FAMILY MEDICINE

## 2024-12-17 NOTE — ASSESSMENT & PLAN NOTE
TSH is stable continuing at same dose of levothyroxine reevaluate lab work 6 months    Orders:    Comprehensive metabolic panel; Future    CBC and differential; Future    Lipid panel; Future    TSH, 3rd generation with Free T4 reflex; Future    Hemoglobin A1C; Future

## 2024-12-17 NOTE — ASSESSMENT & PLAN NOTE
GERD symptoms currently stable continuing on same diet regimen now she is working on a dietitian lead plan with 1300 azul/day which should help improve her overall symptomatology we will reevaluate this at the next visit

## 2024-12-17 NOTE — ASSESSMENT & PLAN NOTE
Continue Zepbound and follow up next ov.    Orders:    Comprehensive metabolic panel; Future    CBC and differential; Future    Lipid panel; Future    TSH, 3rd generation with Free T4 reflex; Future    Hemoglobin A1C; Future

## 2024-12-17 NOTE — ASSESSMENT & PLAN NOTE
Sees Dietician and changed diet 1300 azul/day remains on Metformin. Continue Zepbound    Orders:    Comprehensive metabolic panel; Future    CBC and differential; Future    Lipid panel; Future    TSH, 3rd generation with Free T4 reflex; Future    Hemoglobin A1C; Future

## 2024-12-17 NOTE — PROGRESS NOTES
Name: Heath Ballard      : 1990      MRN: [id number]  Encounter Provider: Taiwo Stearns DO  Encounter Date: 2024   [facility]  :  Assessment & Plan  Polycystic ovarian syndrome  Sees Dietician and changed diet 1300 azul/day remains on Metformin. Continue Zepbound    Orders:    Comprehensive metabolic panel; Future    CBC and differential; Future    Lipid panel; Future    TSH, 3rd generation with Free T4 reflex; Future    Hemoglobin A1C; Future    Prediabetes  Continue Zepbound and follow up next ov.    Orders:    Comprehensive metabolic panel; Future    CBC and differential; Future    Lipid panel; Future    TSH, 3rd generation with Free T4 reflex; Future    Hemoglobin A1C; Future    Acquired hypothyroidism  TSH is stable continuing at same dose of levothyroxine reevaluate lab work 6 months    Orders:    Comprehensive metabolic panel; Future    CBC and differential; Future    Lipid panel; Future    TSH, 3rd generation with Free T4 reflex; Future    Hemoglobin A1C; Future    Gastroesophageal reflux disease without esophagitis  GERD symptoms currently stable continuing on same diet regimen now she is working on a dietitian lead plan with 1300 azul/day which should help improve her overall symptomatology we will reevaluate this at the next visit         MS (multiple sclerosis) (HCC)  Stable followed by neurology                History of Present Illness     Follow up evaluation and lab review.      Review of Systems   Constitutional:  Negative for chills, fatigue and fever.   HENT:  Negative for congestion, nosebleeds, rhinorrhea, sinus pressure and sore throat.    Eyes:  Negative for discharge and redness.   Respiratory:  Negative for cough and shortness of breath.    Cardiovascular:  Negative for chest pain, palpitations and leg swelling.   Gastrointestinal:  Negative for abdominal pain, blood in stool and nausea.   Endocrine: Negative for cold  "intolerance, heat intolerance and polyuria.   Genitourinary:  Negative for dysuria and frequency.   Musculoskeletal:  Negative for arthralgias, back pain and myalgias.   Skin:  Negative for rash.   Neurological:  Negative for dizziness, weakness and headaches.   Hematological:  Negative for adenopathy.   Psychiatric/Behavioral:  Negative for behavioral problems and sleep disturbance. The patient is not nervous/anxious.        Objective   /78 (BP Location: Left arm, Patient Position: Sitting, Cuff Size: Large)   Pulse 70   Temp 97.9 °F (36.6 °C) (Tympanic)   Resp 18   Ht 5' 4\" (1.626 m)   Wt 99.8 kg (220 lb)   LMP 11/29/2024 (Approximate)   SpO2 98%   BMI 37.76 kg/m²      Physical Exam  Vitals and nursing note reviewed.   Constitutional:       General: She is not in acute distress.     Appearance: Normal appearance. She is well-developed.   HENT:      Head: Normocephalic and atraumatic.      Right Ear: External ear normal.      Left Ear: External ear normal.      Nose: Nose normal.      Mouth/Throat:      Mouth: Mucous membranes are moist.      Pharynx: Oropharynx is clear. No oropharyngeal exudate.   Eyes:      General: No scleral icterus.        Right eye: No discharge.         Left eye: No discharge.      Conjunctiva/sclera: Conjunctivae normal.      Pupils: Pupils are equal, round, and reactive to light.   Neck:      Thyroid: No thyromegaly.      Vascular: No JVD.   Cardiovascular:      Rate and Rhythm: Normal rate and regular rhythm.      Heart sounds: Normal heart sounds. No murmur heard.  Pulmonary:      Effort: Pulmonary effort is normal.      Breath sounds: No wheezing or rales.   Chest:      Chest wall: No tenderness.   Abdominal:      General: Bowel sounds are normal. There is no distension.      Palpations: Abdomen is soft. There is no mass.      Tenderness: There is no abdominal tenderness.   Musculoskeletal:         General: No tenderness or deformity. Normal range of motion.      Cervical " back: Normal range of motion.   Lymphadenopathy:      Cervical: No cervical adenopathy.   Skin:     General: Skin is warm and dry.      Findings: No rash.   Neurological:      General: No focal deficit present.      Mental Status: She is alert and oriented to person, place, and time.      Cranial Nerves: No cranial nerve deficit.      Coordination: Coordination normal.      Deep Tendon Reflexes: Reflexes are normal and symmetric. Reflexes normal.   Psychiatric:         Mood and Affect: Mood normal.         Behavior: Behavior normal.         Thought Content: Thought content normal.         Judgment: Judgment normal.

## 2024-12-20 ENCOUNTER — TELEPHONE (OUTPATIENT)
Dept: FAMILY MEDICINE CLINIC | Facility: CLINIC | Age: 34
End: 2024-12-20

## 2024-12-20 NOTE — TELEPHONE ENCOUNTER
Tried to call pt to discuss paperwork in the office. Pt didn't answer left a VM to call office back.

## 2024-12-21 DIAGNOSIS — E66.01 CLASS 2 SEVERE OBESITY DUE TO EXCESS CALORIES WITH SERIOUS COMORBIDITY AND BODY MASS INDEX (BMI) OF 38.0 TO 38.9 IN ADULT (HCC): Primary | ICD-10-CM

## 2024-12-21 DIAGNOSIS — E66.812 CLASS 2 SEVERE OBESITY DUE TO EXCESS CALORIES WITH SERIOUS COMORBIDITY AND BODY MASS INDEX (BMI) OF 38.0 TO 38.9 IN ADULT (HCC): Primary | ICD-10-CM

## 2024-12-21 RX ORDER — TIRZEPATIDE 7.5 MG/.5ML
7.5 INJECTION, SOLUTION SUBCUTANEOUS WEEKLY
Qty: 2 ML | Refills: 1 | Status: SHIPPED | OUTPATIENT
Start: 2024-12-21 | End: 2025-02-15

## 2025-01-06 ENCOUNTER — TELEPHONE (OUTPATIENT)
Dept: NEUROLOGY | Facility: CLINIC | Age: 35
End: 2025-01-06

## 2025-01-06 NOTE — TELEPHONE ENCOUNTER
[employer] forms received. Could not fax forms. Forms signed, scanned into media, and sent to pt through [employer]

## 2025-01-14 DIAGNOSIS — E66.812 CLASS 2 SEVERE OBESITY DUE TO EXCESS CALORIES WITH SERIOUS COMORBIDITY AND BODY MASS INDEX (BMI) OF 38.0 TO 38.9 IN ADULT (HCC): Primary | ICD-10-CM

## 2025-01-14 DIAGNOSIS — E66.01 CLASS 2 SEVERE OBESITY DUE TO EXCESS CALORIES WITH SERIOUS COMORBIDITY AND BODY MASS INDEX (BMI) OF 38.0 TO 38.9 IN ADULT (HCC): Primary | ICD-10-CM

## 2025-01-14 DIAGNOSIS — E03.9 HYPOTHYROIDISM, UNSPECIFIED TYPE: ICD-10-CM

## 2025-01-14 RX ORDER — TIRZEPATIDE 10 MG/.5ML
10 INJECTION, SOLUTION SUBCUTANEOUS WEEKLY
Qty: 2 ML | Refills: 1 | Status: SHIPPED | OUTPATIENT
Start: 2025-01-14 | End: 2025-03-11

## 2025-01-14 RX ORDER — LEVOTHYROXINE SODIUM 50 UG/1
50 TABLET ORAL DAILY
Qty: 90 TABLET | Refills: 1 | Status: SHIPPED | OUTPATIENT
Start: 2025-01-14

## 2025-01-16 ENCOUNTER — CONSULT (OUTPATIENT)
Dept: PAIN MEDICINE | Facility: CLINIC | Age: 35
End: 2025-01-16
Payer: COMMERCIAL

## 2025-01-16 VITALS
WEIGHT: 215 LBS | DIASTOLIC BLOOD PRESSURE: 78 MMHG | BODY MASS INDEX: 36.7 KG/M2 | HEART RATE: 80 BPM | HEIGHT: 64 IN | RESPIRATION RATE: 16 BRPM | SYSTOLIC BLOOD PRESSURE: 120 MMHG

## 2025-01-16 DIAGNOSIS — M47.894 THORACIC FACET JOINT SYNDROME: ICD-10-CM

## 2025-01-16 DIAGNOSIS — M54.59 LUMBAR FACET JOINT PAIN: ICD-10-CM

## 2025-01-16 DIAGNOSIS — M53.9 DISORDER OF THORACIC SPINE: ICD-10-CM
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
## 2025-01-16 DIAGNOSIS — R93.7 ABNORMAL MRI, THORACIC SPINE: ICD-10-CM

## 2025-01-16 PROCEDURE — 99244 OFF/OP CNSLTJ NEW/EST MOD 40: CPT | Performed by: PHYSICAL MEDICINE & REHABILITATION
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
## 2025-01-18 ENCOUNTER — APPOINTMENT (OUTPATIENT)
Dept: RADIOLOGY | Facility: MEDICAL CENTER | Age: 35
End: 2025-01-18
Payer: COMMERCIAL

## 2025-01-18 DIAGNOSIS — M54.59 LUMBAR FACET JOINT PAIN: ICD-10-CM
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
## 2025-01-18 DIAGNOSIS — R93.7 ABNORMAL MRI, THORACIC SPINE: ICD-10-CM

## 2025-01-18 DIAGNOSIS — M47.894 THORACIC FACET JOINT SYNDROME: ICD-10-CM

## 2025-01-18 DIAGNOSIS — M53.9 DISORDER OF THORACIC SPINE: ICD-10-CM

## 2025-01-18 PROCEDURE — 72110 X-RAY EXAM L-2 SPINE 4/>VWS: CPT

## 2025-01-24 ENCOUNTER — TELEPHONE (OUTPATIENT)
Age: 35
End: 2025-01-24

## 2025-01-24 NOTE — TELEPHONE ENCOUNTER
Caller: Mere ( [facility])    Doctor: Anthony     Reason for call: Mere stated that she received the prior auth for pt injections but more clinical information is needed. If they can be uploaded to the portal, or if there are any questions she stated you can give her a call.     Call back#: [phone]

## 2025-01-27 NOTE — TELEPHONE ENCOUNTER
Caller: Mere ( [facility])    Doctor: Anthony    Reason for call: Mere wanted to know if we are requesting a level 3 injection, if so it does not meet the policy , the policy only allows 2 level injections. It can be sent to the medical director for possible denial, but Mere stated she can remove 1 level verbally and it can be approved. Please Advise     Call back#: [phone]

## 2025-02-07 DIAGNOSIS — E66.812 CLASS 2 SEVERE OBESITY DUE TO EXCESS CALORIES WITH SERIOUS COMORBIDITY AND BODY MASS INDEX (BMI) OF 38.0 TO 38.9 IN ADULT (HCC): ICD-10-CM

## 2025-02-07 DIAGNOSIS — E66.01 CLASS 2 SEVERE OBESITY DUE TO EXCESS CALORIES WITH SERIOUS COMORBIDITY AND BODY MASS INDEX (BMI) OF 38.0 TO 38.9 IN ADULT (HCC): ICD-10-CM

## 2025-02-07 RX ORDER — TIRZEPATIDE 10 MG/.5ML
10 INJECTION, SOLUTION SUBCUTANEOUS WEEKLY
Qty: 6 ML | Refills: 0 | Status: SHIPPED | OUTPATIENT
Start: 2025-02-07 | End: 2025-05-08

## 2025-02-10 ENCOUNTER — HOSPITAL ENCOUNTER (OUTPATIENT)
Dept: RADIOLOGY | Facility: CLINIC | Age: 35
Discharge: HOME | End: 2025-02-10
Payer: COMMERCIAL

## 2025-02-10 VITALS
SYSTOLIC BLOOD PRESSURE: 119 MMHG | OXYGEN SATURATION: 98 % | TEMPERATURE: 97.5 F | DIASTOLIC BLOOD PRESSURE: 84 MMHG | HEART RATE: 76 BPM | RESPIRATION RATE: 18 BRPM

## 2025-02-10 DIAGNOSIS — M54.59 LUMBAR FACET JOINT PAIN: ICD-10-CM

## 2025-02-10 DIAGNOSIS — M53.9 DISORDER OF THORACIC SPINE: ICD-10-CM

## 2025-02-10 PROCEDURE — 64494 INJ PARAVERT F JNT L/S 2 LEV: CPT | Performed by: PHYSICAL MEDICINE & REHABILITATION

## 2025-02-10 PROCEDURE — 64493 INJ PARAVERT F JNT L/S 1 LEV: CPT | Performed by: PHYSICAL MEDICINE & REHABILITATION

## 2025-02-10 RX ORDER — BUPIVACAINE HCL/PF 2.5 MG/ML
3 VIAL (ML) INJECTION ONCE
Status: COMPLETED | OUTPATIENT
Start: 2025-02-10 | End: 2025-02-10

## 2025-02-10 RX ADMIN — BUPIVACAINE HYDROCHLORIDE 3 ML: 2.5 INJECTION, SOLUTION EPIDURAL; INFILTRATION; INTRACAUDAL at 15:43

## 2025-02-10 NOTE — H&P
History of Present Illness: The patient is a 34 y.o. female who presents with complaints of low back pain    Past Medical History:   Diagnosis Date    Acute pain of left knee 05/11/2021    Migraine     MS (multiple sclerosis) (HCC)     PCOS (polycystic ovarian syndrome)     Thyroid disease        Past Surgical History:   Procedure Laterality Date    COSMETIC SURGERY  2016    Calcaneous fracture    FL MYELOGRAM THORACIC  11/6/2024    IR SPINE PROCEDURE           Current Outpatient Medications:     ciclopirox (LOPROX) 0.77 % cream, Apply to affected area two times daily, Disp: 90 g, Rfl: 0    fluticasone (FLONASE) 50 mcg/act nasal spray, , Disp: , Rfl:     ibuprofen (MOTRIN) 800 mg tablet, Take 1 tablet (800 mg total) by mouth every 8 (eight) hours as needed for mild pain, Disp: 60 tablet, Rfl: 6    ketorolac (TORADOL) 10 mg tablet, Take 1 tablet at the onset of a migraine headache, Disp: 10 tablet, Rfl: 2    levothyroxine 50 mcg tablet, Take 1 tablet (50 mcg total) by mouth daily, Disp: 90 tablet, Rfl: 1    metFORMIN (GLUCOPHAGE-XR) 500 mg 24 hr tablet, TAKE TWO TABLETS BY MOUTH TWICE DAILY WITH MEALS, Disp: 360 tablet, Rfl: 0    tirzepatide (Zepbound) 10 mg/0.5 mL auto-injector, Inject 0.5 mL (10 mg total) under the skin once a week, Disp: 6 mL, Rfl: 0    Current Facility-Administered Medications:     bupivacaine (PF) (MARCAINE) 0.25 % injection 3 mL, 3 mL, Perineural, Once, Candelario Cruz, DO    No Known Allergies    Physical Exam:   Vitals:    02/10/25 1522   BP: 104/67   Pulse: 83   Resp: 18   Temp: 97.5 °F (36.4 °C)   SpO2: 98%     General: Awake, Alert, Oriented x 3, Mood and affect appropriate  Respiratory: Respirations even and unlabored  Cardiovascular: Peripheral pulses intact; no edema  Musculoskeletal Exam: Tenderness palpation bilateral lumbar paraspinals    ASA Score: 2    Patient/Chart Verification  Patient ID Verified: Verbal  ID Band Applied: No  H&P( within 30 days) Verified: To be obtained in  the Procedural area  Allergies Reviewed: Yes  Anticoag/NSAID held?: No  Currently on antibiotics?: No  Pregnancy denied?: Yes  Does Patient Have a Prosthetic Device/Implant: No (cardiac pacer / ICD)    Assessment:   1. Disorder of thoracic spine    2. Lumbar facet joint pain        Plan: Bilateral L2, L3, L4 MBB #1

## 2025-02-10 NOTE — DISCHARGE INSTR - LAB

## 2025-02-11 ENCOUNTER — TRANSCRIBE ORDERS (OUTPATIENT)
Dept: PAIN MEDICINE | Facility: CLINIC | Age: 35
End: 2025-02-11

## 2025-02-13 ENCOUNTER — TELEPHONE (OUTPATIENT)
Dept: PAIN MEDICINE | Facility: CLINIC | Age: 35
End: 2025-02-13

## 2025-02-13 NOTE — TELEPHONE ENCOUNTER
----- Message from Candelario Cruz DO sent at 2/11/2025  2:13 PM EST -----  Regarding: FW: Pain Diary  Okay to proceed with MBB #2  Thank you  ----- Message -----  From: Suki Hills  Sent: 2/11/2025   1:24 PM EST  To: Candelario Cruz DO  Subject: Pain Diary                                       The above patient has returned their pain diary and it is now scanned into their chart for your review.    Thank you,  Jarret

## 2025-02-13 NOTE — TELEPHONE ENCOUNTER
Patient has been scheduled for their procedure, please see [email or web address] message for additional details.

## 2025-02-17 ENCOUNTER — OFFICE VISIT (OUTPATIENT)
Dept: BARIATRICS | Facility: CLINIC | Age: 35
End: 2025-02-17
Payer: COMMERCIAL

## 2025-02-17 VITALS
WEIGHT: 205.6 LBS | HEART RATE: 94 BPM | SYSTOLIC BLOOD PRESSURE: 140 MMHG | DIASTOLIC BLOOD PRESSURE: 82 MMHG | BODY MASS INDEX: 35.1 KG/M2 | HEIGHT: 64 IN | OXYGEN SATURATION: 98 %

## 2025-02-17 DIAGNOSIS — E28.2 POLYCYSTIC OVARIAN SYNDROME: ICD-10-CM

## 2025-02-17 DIAGNOSIS — R73.03 PREDIABETES: ICD-10-CM

## 2025-02-17 DIAGNOSIS — E66.01 CLASS 2 SEVERE OBESITY DUE TO EXCESS CALORIES WITH SERIOUS COMORBIDITY AND BODY MASS INDEX (BMI) OF 38.0 TO 38.9 IN ADULT (HCC): Primary | ICD-10-CM

## 2025-02-17 DIAGNOSIS — E66.812 CLASS 2 SEVERE OBESITY DUE TO EXCESS CALORIES WITH SERIOUS COMORBIDITY AND BODY MASS INDEX (BMI) OF 38.0 TO 38.9 IN ADULT (HCC): Primary | ICD-10-CM

## 2025-02-17 PROCEDURE — 99214 OFFICE O/P EST MOD 30 MIN: CPT

## 2025-02-17 RX ORDER — TIRZEPATIDE 10 MG/.5ML
10 INJECTION, SOLUTION SUBCUTANEOUS WEEKLY
Qty: 6 ML | Refills: 2 | Status: SHIPPED | OUTPATIENT
Start: 2025-02-17 | End: 2025-02-20

## 2025-02-17 NOTE — ASSESSMENT & PLAN NOTE
- Patient is pursuing  and follow up visits with medical weight management provider  - Initial weight loss goal of 5-10% weight loss for improved health. Weight loss can improve patient's co-morbid conditions and/or prevent weight-related complications.  - Explained the importance of continuing lifestyle changes in addition to any anti-obesity medications.   - Labs reviewed from 11/24- current pending     General Recommendations:  Nutrition:  Eat breakfast daily.  Do not skip meals.      Food log (ie.) www.[employer].com, sparkpeople.com, loseit.com, calorieking.com, etc.     Practice mindful eating.  Be sure to set aside time to eat, eat slowly, and savor your food.     Hydration:    At least 64oz of water daily.  No sugar sweetened beverages.  No juice (eat the fruit instead).     Exercise:  Studies have shown that the ideal exercise goal is somewhere between 150 to 300 minutes of moderate intensity exercise a week.  Start with exercising 10 minutes every other day and gradually increase physical activity with a goal of at least 150 minutes of moderate intensity exercise a week, divided over at least 3 days a week.  An example of this would be exercising 30 minutes a day, 5 days a week.  Resistance training can increase muscle mass and increase our resting metabolic rate.   FULL BODY resistance training is recommended 2-3 times a week.  Do not do this on consecutive days to allow for muscle recovery.     Aim for a bare minimum 5000 steps, even on days you do not exercise.     Monitoring:   Weigh yourself daily.  If this causes undue stress, then just weigh yourself once a week.  Weigh yourself the same time of the day with the same amount of clothing on.  Preferably this should be done after waking up, before you eat, and with no clothing or minimal clothing on.     Specific Goals:  Discussed her lifestyle and nutrition today and she is being mindful of her protein intake. She is encouraged to try to eat small  more frequent meals a day to ensure she is meeting her caloric/protein needs. She has made a lot of diet and exercise changes and is congratulated on her progress.   Encouraged to continue her exercise routine and try to make sure she is adding strength training as she is able.  Hydrate well with water  Discussed Zepbound 10mg: will continue at 10mg as she is doing well without side effects.   Patient denies personal history of pancreatitis. Patient also denies personal and family history of medullary thyroid cancer and multiple endocrine neoplasia type 2 (MEN 2 tumor).

## 2025-02-17 NOTE — PROGRESS NOTES
Assessment/Plan:     Class 2 severe obesity due to excess calories with serious comorbidity and body mass index (BMI) of 38.0 to 38.9 in adult (HCC)  - Patient is pursuing  and follow up visits with medical weight management provider  - Initial weight loss goal of 5-10% weight loss for improved health. Weight loss can improve patient's co-morbid conditions and/or prevent weight-related complications.  - Explained the importance of continuing lifestyle changes in addition to any anti-obesity medications.   - Labs reviewed from 11/24- current pending     General Recommendations:  Nutrition:  Eat breakfast daily.  Do not skip meals.      Food log (ie.) [email or web address], sparkpeople.com, loseit.com, [employer].com, etc.     Practice mindful eating.  Be sure to set aside time to eat, eat slowly, and savor your food.     Hydration:    At least 64oz of water daily.  No sugar sweetened beverages.  No juice (eat the fruit instead).     Exercise:  Studies have shown that the ideal exercise goal is somewhere between 150 to 300 minutes of moderate intensity exercise a week.  Start with exercising 10 minutes every other day and gradually increase physical activity with a goal of at least 150 minutes of moderate intensity exercise a week, divided over at least 3 days a week.  An example of this would be exercising 30 minutes a day, 5 days a week.  Resistance training can increase muscle mass and increase our resting metabolic rate.   FULL BODY resistance training is recommended 2-3 times a week.  Do not do this on consecutive days to allow for muscle recovery.     Aim for a bare minimum 5000 steps, even on days you do not exercise.     Monitoring:   Weigh yourself daily.  If this causes undue stress, then just weigh yourself once a week.  Weigh yourself the same time of the day with the same amount of clothing on.  Preferably this should be done after waking up, before you eat, and with no clothing or minimal clothing on.      Specific Goals:  Discussed her lifestyle and nutrition today and she is being mindful of her protein intake. She is encouraged to try to eat small more frequent meals a day to ensure she is meeting her caloric/protein needs. She has made a lot of diet and exercise changes and is congratulated on her progress.   Encouraged to continue her exercise routine and try to make sure she is adding strength training as she is able.  Hydrate well with water  Discussed Zepbound 10mg: will continue at 10mg as she is doing well without side effects.   Patient denies personal history of pancreatitis. Patient also denies personal and family history of medullary thyroid cancer and multiple endocrine neoplasia type 2 (MEN 2 tumor).    Polycystic ovarian syndrome  Would likely benefit from a GLP-1 medication to help with insulin resistance and metabolic disorder       Prediabetes  Latest A1c was 6.0  Patient would benefit from GLP-1 therapy to reduce her blood sugar levels         Diagnoses and all orders for this visit:    Class 2 severe obesity due to excess calories with serious comorbidity and body mass index (BMI) of 38.0 to 38.9 in adult (HCC)  -     tirzepatide (Zepbound) 10 mg/0.5 mL auto-injector; Inject 0.5 mL (10 mg total) under the skin once a week    Polycystic ovarian syndrome    Prediabetes        Total time spent reviewing chart, interviewing patient, examining patient, discussing plan, answering all questions, and documentin minutes with >50% face-to-face time with the patient.    Follow up in approximately 3 months with Non-Surgical Physician/Advanced Practitioner.    Subjective:   No chief complaint on file.      Patient ID: Heath Ballard  is a 34 y.o. female with excess weight/obesity here to pursue weight management.  Patient is pursuing Conservative Program.   Most recent notes and records were reviewed.    HPI    Wt Readings from Last 20 Encounters:   25 93.3 kg (205 lb 9.6 oz)   25 97.5 kg  (215 lb)   12/17/24 99.8 kg (220 lb)   12/09/24 100 kg (221 lb 8 oz)   11/29/24 104 kg (230 lb)   11/06/24 104 kg (230 lb)   10/18/24 105 kg (231 lb)   09/24/24 105 kg (231 lb)   09/13/24 102 kg (224 lb)   08/30/24 104 kg (228 lb 3.2 oz)   08/02/24 106 kg (234 lb 4.8 oz)   07/18/24 103 kg (227 lb 12.8 oz)   07/02/24 104 kg (230 lb 3.2 oz)   02/27/24 106 kg (234 lb)   12/05/23 110 kg (241 lb 9.6 oz)   09/11/23 102 kg (225 lb)   08/23/23 103 kg (227 lb 9.6 oz)   05/18/23 101 kg (223 lb)   04/12/23 101 kg (222 lb)   01/27/23 100 kg (221 lb 6.4 oz)       Patient presents today to medical weight management office for follow up. On zepbound 10mg no complaints of side effects. she thinks the medication has been helpful to get her appetite controlled and she is more mindful of her nutrition.  She is also more active by walking on the treadmill regularly and has added strength training.  She is eating smaller more frequent meals throughout the day and is being mindful of her protein intake.  She continues to work with the dietitian.  Patient is hopeful about her current weight loss excess and would like to continue with the medication.  She may be switching insurance companies in the next year and will try to stay ahead of her authorizations to avoid any gaps in her therapy.     Weight loss medication and dose: zepbound 10mg   Started weight and date: 230.2 lbs 2024/ .5 lbs 9/2024  Current weight: 205 lbs   Difference: -16.5 since LOV, -25 lbs since start    Starting BMI: 39.51  Current BMI: 35    Waist Measurements:  7/2024: 45.75 in  9/2024: 45.5 in          Nutrition Prescription  Calories:1200 - 1500  Protein:65 - 82  Fluid:64     Diet recall:  B: yogurt with granola and fruit OR protein shake  L: skips OR leftovers OR hotpocket  D: protein and starch and vegetable  S: recently stopped     Hydration: water, coffee with sugar  Alcohol: no  Smoking: yes - 1ppd  Exercise: walking 2-4 miles per day, doing some light  "weight training   Occupation: works at a [employer]  Sleep: ok      The following portions of the patient's history were reviewed and updated as appropriate: allergies, current medications, past family history, past medical history, past social history, past surgical history, and problem list.    Family History   Problem Relation Age of Onset    Diabetes Mother     Lupus Mother     Hypertension Mother     Cancer Maternal Grandmother     Heart failure Paternal Grandfather     Stroke Maternal Aunt     Stroke Maternal Aunt     Stroke Maternal Aunt     Breast cancer Neg Hx     Colon cancer Neg Hx     Ovarian cancer Neg Hx     Uterine cancer Neg Hx     Cervical cancer Neg Hx         Review of Systems   Constitutional:  Negative for fatigue.   HENT:  Negative for sore throat.    Respiratory:  Negative for cough and shortness of breath.    Cardiovascular:  Negative for chest pain, palpitations and leg swelling.   Gastrointestinal:  Negative for abdominal pain, constipation, diarrhea, nausea and vomiting.   Genitourinary:  Negative for dysuria.   Musculoskeletal:  Negative for arthralgias and back pain.   Skin:  Negative for rash.   Neurological:  Negative for headaches.   Psychiatric/Behavioral:  Negative for dysphoric mood. The patient is not nervous/anxious.        Objective:  /82 (BP Location: Left arm, Patient Position: Sitting, Cuff Size: Large)   Pulse 94   Ht 5' 4\" (1.626 m)   Wt 93.3 kg (205 lb 9.6 oz)   LMP 02/13/2025   SpO2 98%   BMI 35.29 kg/m²     Physical Exam  Vitals and nursing note reviewed.   Constitutional:       Appearance: Normal appearance. She is obese.   HENT:      Head: Normocephalic.   Pulmonary:      Effort: Pulmonary effort is normal.   Neurological:      Mental Status: She is oriented to person, place, and time.   Psychiatric:         Mood and Affect: Mood normal.         Behavior: Behavior normal.         Thought Content: Thought content normal.         Judgment: Judgment normal. "            Labs   Most recent labs reviewed   Lab Results   Component Value Date     (L) 03/16/2014    SODIUM 138 11/29/2024    K 4.3 11/29/2024     11/29/2024    CO2 27 11/29/2024    ANIONGAP 10 03/16/2014    AGAP 7 11/29/2024    BUN 14 11/29/2024    CREATININE 0.89 11/29/2024    GLUC 103 09/24/2024    GLUF 82 11/29/2024    CALCIUM 9.1 11/29/2024    AST 19 11/29/2024    ALT 29 11/29/2024    ALKPHOS 68 11/29/2024    TP 7.1 11/29/2024    TBILI 0.36 11/29/2024    EGFR 83 11/29/2024     Lab Results   Component Value Date    HGBA1C 6.0 (H) 11/15/2024     Lab Results   Component Value Date    [id number] 2.806 11/15/2024    TSH 0.74 12/11/2020     Lab Results   Component Value Date    CHOLESTEROL 189 11/15/2024     Lab Results   Component Value Date    HDL 41 (L) 11/15/2024     Lab Results   Component Value Date    TRIG 167 (H) 11/15/2024     Lab Results   Component Value Date    LDLCALC 115 (H) 11/15/2024

## 2025-02-18 ENCOUNTER — TELEPHONE (OUTPATIENT)
Dept: BARIATRICS | Facility: CLINIC | Age: 35
End: 2025-02-18

## 2025-02-19 NOTE — TELEPHONE ENCOUNTER
Zepbound Approved 2/18/25-8/19/25.  Pharmacy was notified.  Too soon to fill at this point.  They are going to try to fill Monday 2/24 as the last time filled was 1/28/25.  Patient notified via Mychart.

## 2025-02-20 DIAGNOSIS — E66.812 CLASS 2 SEVERE OBESITY DUE TO EXCESS CALORIES WITH SERIOUS COMORBIDITY AND BODY MASS INDEX (BMI) OF 38.0 TO 38.9 IN ADULT (HCC): Primary | ICD-10-CM

## 2025-02-20 DIAGNOSIS — E66.01 CLASS 2 SEVERE OBESITY DUE TO EXCESS CALORIES WITH SERIOUS COMORBIDITY AND BODY MASS INDEX (BMI) OF 38.0 TO 38.9 IN ADULT (HCC): Primary | ICD-10-CM

## 2025-02-20 RX ORDER — TIRZEPATIDE 12.5 MG/.5ML
12.5 INJECTION, SOLUTION SUBCUTANEOUS WEEKLY
Qty: 2 ML | Refills: 0 | Status: SHIPPED | OUTPATIENT
Start: 2025-02-20 | End: 2025-03-20

## 2025-03-12 DIAGNOSIS — R63.5 ABNORMAL WEIGHT GAIN: ICD-10-CM

## 2025-03-12 DIAGNOSIS — E66.812 CLASS 2 SEVERE OBESITY DUE TO EXCESS CALORIES WITH SERIOUS COMORBIDITY AND BODY MASS INDEX (BMI) OF 38.0 TO 38.9 IN ADULT (HCC): Primary | ICD-10-CM

## 2025-03-12 DIAGNOSIS — E66.01 CLASS 2 SEVERE OBESITY DUE TO EXCESS CALORIES WITH SERIOUS COMORBIDITY AND BODY MASS INDEX (BMI) OF 38.0 TO 38.9 IN ADULT (HCC): Primary | ICD-10-CM

## 2025-03-12 DIAGNOSIS — E28.2 POLYCYSTIC OVARIAN SYNDROME: ICD-10-CM

## 2025-03-12 DIAGNOSIS — R73.03 PREDIABETES: ICD-10-CM

## 2025-03-12 RX ORDER — TIRZEPATIDE 15 MG/.5ML
15 INJECTION, SOLUTION SUBCUTANEOUS WEEKLY
Qty: 2 ML | Refills: 2 | Status: SHIPPED | OUTPATIENT
Start: 2025-03-12 | End: 2025-06-04

## 2025-04-22 ENCOUNTER — TELEPHONE (OUTPATIENT)
Dept: NEUROLOGY | Facility: CLINIC | Age: 35
End: 2025-04-22

## 2025-04-22 NOTE — TELEPHONE ENCOUNTER
To From Date Type Message   DENISE Atwood CRNP 4/22/2025 Send Plan Pt missed her last appts with me and is now scheduled in July. She is due for Ocrevus prior. Can you make sure she is scheduled and has labs completed prior?

## 2025-04-23 ENCOUNTER — VBI (OUTPATIENT)
Dept: ADMINISTRATIVE | Facility: OTHER | Age: 35
End: 2025-04-23

## 2025-04-23 NOTE — TELEPHONE ENCOUNTER
04/23/25 8:17 AM     Chart reviewed for Pap Smear (HPV) aka Cervical Cancer Screening was/were submitted to the patient's insurance.     Shirley Oneill MA   [facility]

## 2025-04-29 NOTE — TELEPHONE ENCOUNTER
Pt was rescheduled due to the provider being out sick but m, the pt stated she was going to reschedule anyway because she has an ENT appt. //kah   Scheduled with Adrienne on 7/30/25.     Next Ocrevus infusion is scheduled 5/22/25.     Will f/u closer to that date with infusion appt and lab reminder.

## 2025-05-02 ENCOUNTER — TELEPHONE (OUTPATIENT)
Dept: NEUROLOGY | Facility: CLINIC | Age: 35
End: 2025-05-02

## 2025-05-02 NOTE — TELEPHONE ENCOUNTER
Pt is scheduled for next Ocrevus infusion on 5/22/25. Pt has new insurance.     Primary coverage with [employer]. Submitted PA on Availity with clinicals. Pending auth # [id number]. Awaiting determination.       Secondary coverage with [facility]. Faxed PA request with clinicals. Fax failed.     Clerical team- could you please fax the Ocrevus PA in media to [phone]? Thanks so much!

## 2025-05-03 ENCOUNTER — TELEPHONE (OUTPATIENT)
Dept: NEUROLOGY | Facility: CLINIC | Age: 35
End: 2025-05-03

## 2025-05-03 DIAGNOSIS — Z11.1 TUBERCULOSIS SCREENING: ICD-10-CM

## 2025-05-03 DIAGNOSIS — G35 MS (MULTIPLE SCLEROSIS) (HCC): Primary | ICD-10-CM

## 2025-05-03 DIAGNOSIS — T45.1X5A IMMUNODEFICIENCY DUE TO TREATMENT WITH IMMUNOSUPPRESSIVE MEDICATION (HCC): ICD-10-CM

## 2025-05-03 DIAGNOSIS — Z79.60 IMMUNODEFICIENCY DUE TO TREATMENT WITH IMMUNOSUPPRESSIVE MEDICATION (HCC): ICD-10-CM

## 2025-05-03 DIAGNOSIS — D84.821 IMMUNODEFICIENCY DUE TO TREATMENT WITH IMMUNOSUPPRESSIVE MEDICATION (HCC): ICD-10-CM

## 2025-05-03 DIAGNOSIS — Z11.59 ENCOUNTER FOR SCREENING FOR VIRAL DISEASE: ICD-10-CM

## 2025-05-03 NOTE — TELEPHONE ENCOUNTER
Pt is scheduled for Ocrevus on 5/22/25.     PA pending in other encounter.     The following labs are required and have been entered as appropriate per protocol:    CBC  CMP  HCG  Immunoglobulins  HIV  Quantiferon TB gold  Acute hepatitis panel    Lab scripts pended, please review and sign if agreeable. Thanks!

## 2025-05-06 NOTE — TELEPHONE ENCOUNTER
Received voicemail from Laura with [facility].     Ocrevus is approved with [facility] from 5/5/25 to 5/5/26. Auth # [id number].     They are mailing the approval letter.    Per Availity:    Ocrevus is approved with [employer] from 5/2/25 to 5/2/26 for 2 visits at [facility]. Auth # [id number].

## 2025-05-08 NOTE — TELEPHONE ENCOUNTER
Spoke with pt. She requested cancellation of Ocrevus infusion appt. States she would like to see Adrienne before moving forward with further treatments. Pt is not scheduled until 7/30/25. Pt states she is already on the waitlist and has been contacted for sooner appts. She is waiting for appt to come up that works better with her schedule to accept.    Called [facility] infusion center and cancelled infusion appt as requested.      GURDEEP Deleon

## 2025-05-13 NOTE — TELEPHONE ENCOUNTER
Spoke with pt. Denies side effects or cost issues with Ocrevus. States she wants to discuss alternative treatment options.    Did check schedule, no sooner appts currently available that work for pt. She is on waitlist.

## 2025-05-14 NOTE — TELEPHONE ENCOUNTER
Called and left a message for pt to call the office back. When she calls back you can check to see if I am available. I'd like to get a better understanding about why she does not want Ocrevus and wants to discuss alternatives. Typically we do not change DMTs without a reason (side effects, scheduling/compliance issues, cost, etc.).

## 2025-05-15 NOTE — TELEPHONE ENCOUNTER
Called pt again, no answer. Did not leave another message. Will try her again later.        Will also send a [employer] message

## 2025-05-19 ENCOUNTER — OFFICE VISIT (OUTPATIENT)
Dept: BARIATRICS | Facility: CLINIC | Age: 35
End: 2025-05-19
Payer: COMMERCIAL

## 2025-05-19 VITALS
WEIGHT: 188 LBS | HEIGHT: 64 IN | OXYGEN SATURATION: 98 % | HEART RATE: 83 BPM | BODY MASS INDEX: 32.1 KG/M2 | DIASTOLIC BLOOD PRESSURE: 68 MMHG | SYSTOLIC BLOOD PRESSURE: 108 MMHG

## 2025-05-19 DIAGNOSIS — E66.811 OBESITY, CLASS I, BMI 30-34.9: Primary | ICD-10-CM

## 2025-05-19 DIAGNOSIS — R73.03 PREDIABETES: ICD-10-CM

## 2025-05-19 DIAGNOSIS — E28.2 POLYCYSTIC OVARIAN SYNDROME: ICD-10-CM

## 2025-05-19 PROCEDURE — 99214 OFFICE O/P EST MOD 30 MIN: CPT

## 2025-05-19 RX ORDER — TIRZEPATIDE 15 MG/.5ML
15 INJECTION, SOLUTION SUBCUTANEOUS WEEKLY
Qty: 2 ML | Refills: 3 | Status: SHIPPED | OUTPATIENT
Start: 2025-05-19 | End: 2025-09-08

## 2025-05-19 NOTE — PROGRESS NOTES
Assessment/Plan:     Obesity, Class I, BMI 30-34.9  - Patient is pursuing Conservative Program and follow up visits with medical weight management provider  - Initial weight loss goal of 5-10% weight loss for improved health. Weight loss can improve patient's co-morbid conditions and/or prevent weight-related complications.  - Explained the importance of continuing lifestyle changes in addition to any anti-obesity medications.   - Labs reviewed from 11/2024    General Recommendations:  Nutrition:  Eat breakfast daily.  Do not skip meals.      Food log (ie.) www.[employer].com, sparkpeople.com, loseit.com, calorieking.com, etc.     Practice mindful eating.  Be sure to set aside time to eat, eat slowly, and savor your food.     Hydration:    At least 64oz of water daily.  No sugar sweetened beverages.  No juice (eat the fruit instead).     Exercise:  Studies have shown that the ideal exercise goal is somewhere between 150 to 300 minutes of moderate intensity exercise a week.  Start with exercising 10 minutes every other day and gradually increase physical activity with a goal of at least 150 minutes of moderate intensity exercise a week, divided over at least 3 days a week.  An example of this would be exercising 30 minutes a day, 5 days a week.  Resistance training can increase muscle mass and increase our resting metabolic rate.   FULL BODY resistance training is recommended 2-3 times a week.  Do not do this on consecutive days to allow for muscle recovery.     Aim for a bare minimum 5000 steps, even on days you do not exercise.     Monitoring:   Weigh yourself daily.  If this causes undue stress, then just weigh yourself once a week.  Weigh yourself the same time of the day with the same amount of clothing on.  Preferably this should be done after waking up, before you eat, and with no clothing or minimal clothing on.     Specific Goals:  Discussed her lifestyle and nutrition and she has been doing well on her  zepbound 15 mg. She is congratulated on her progress. She has been doing a cleanse these last few months and has stopped all of her normal medication besides the zepbound. Eating all organic foods and taking supplements, she wants to take a holistic approach to her health maintenance. She is advised to continue to exercise. Be mindful of her protein intake and to let her pcp and neuro know that she has stopped her medications. She has labs ordered as well and it is recommended she get those done as she has complained of increased fatigue since stopping her meds.   Will continue on zepbound 15mg.   Patient denies personal history of pancreatitis. Patient also denies personal and family history of medullary thyroid cancer and multiple endocrine neoplasia type 2 (MEN 2 tumor).           Heath was seen today for follow-up.    Diagnoses and all orders for this visit:    Obesity, Class I, BMI 30-34.9  -     tirzepatide (Zepbound) 15 mg/0.5 mL auto-injector; Inject 0.5 mL (15 mg total) under the skin once a week    Polycystic ovarian syndrome  -     tirzepatide (Zepbound) 15 mg/0.5 mL auto-injector; Inject 0.5 mL (15 mg total) under the skin once a week    Prediabetes  -     tirzepatide (Zepbound) 15 mg/0.5 mL auto-injector; Inject 0.5 mL (15 mg total) under the skin once a week        Total time spent reviewing chart, interviewing patient, examining patient, discussing plan, answering all questions, and documentin minutes with >50% face-to-face time with the patient.    Follow up in approximately 3 months with Non-Surgical Physician/Advanced Practitioner.    Subjective:   Chief Complaint   Patient presents with    Follow-up     Zepbound 15mg follow up        Patient ID: Heath Ballard  is a 35 y.o. female with excess weight/obesity here to pursue weight management.  Patient is pursuing Conservative Program.   Most recent notes and records were reviewed.    HPI    Wt Readings from Last 20 Encounters:   25 85.3 kg  (188 lb)   02/17/25 93.3 kg (205 lb 9.6 oz)   01/16/25 97.5 kg (215 lb)   12/17/24 99.8 kg (220 lb)   12/09/24 100 kg (221 lb 8 oz)   11/29/24 104 kg (230 lb)   11/06/24 104 kg (230 lb)   10/18/24 105 kg (231 lb)   09/24/24 105 kg (231 lb)   09/13/24 102 kg (224 lb)   08/30/24 104 kg (228 lb 3.2 oz)   08/02/24 106 kg (234 lb 4.8 oz)   07/18/24 103 kg (227 lb 12.8 oz)   07/02/24 104 kg (230 lb 3.2 oz)   02/27/24 106 kg (234 lb)   12/05/23 110 kg (241 lb 9.6 oz)   09/11/23 102 kg (225 lb)   08/23/23 103 kg (227 lb 9.6 oz)   05/18/23 101 kg (223 lb)   04/12/23 101 kg (222 lb)       Patient presents today to medical weight management office for follow up. She has been on zepbound 15 mg and doing with no side effects. She does notice more fatigue but she admits that she has stopped her medications for her MS and thyroid based on research she's been doing on the harms of some of these medications. She is trying a parasite cleanse right now, unguided, because she feels she has nothing to lose.        Weight loss medication and dose: zepbound 15 mg/ metformin 2000 mg   Started weight and date: 230.2 lbs 2024/  lbs 2/2025  Current weight: 188 lbs  Difference: -17 lbs since LOV, -42 lbs from 2024  Starting BMI: 39  Current BMI: 32       Nutrition Prescription  Calories:1200 - 1500  Protein:65 - 82  Fluid:64     Diet recall:  B: yogurt with granola and fruit OR protein shake  L: skips OR leftovers OR hotpocket  D: protein and starch and vegetable  S: recently stopped     Hydration: water, coffee with sugar  Alcohol: no  Smoking: yes - 1ppd  Exercise: walking 2-4 miles per day, doing some light weight training   Occupation: works at a [employer]  Sleep: ok      The following portions of the patient's history were reviewed and updated as appropriate: allergies, current medications, past family history, past medical history, past social history, past surgical history, and problem list.    Family History   Problem Relation  "Age of Onset    Diabetes Mother     Lupus Mother     Hypertension Mother     Cancer Maternal Grandmother     Heart failure Paternal Grandfather     Stroke Maternal Aunt     Stroke Maternal Aunt     Stroke Maternal Aunt     Breast cancer Neg Hx     Colon cancer Neg Hx     Ovarian cancer Neg Hx     Uterine cancer Neg Hx     Cervical cancer Neg Hx         Review of Systems   Constitutional:  Negative for fatigue.   HENT:  Negative for sore throat.    Respiratory:  Negative for cough and shortness of breath.    Cardiovascular:  Negative for chest pain, palpitations and leg swelling.   Gastrointestinal:  Negative for abdominal pain, constipation, diarrhea, nausea and vomiting.   Genitourinary:  Negative for dysuria.   Musculoskeletal:  Negative for arthralgias and back pain.   Skin:  Negative for rash.   Neurological:  Negative for headaches.   Psychiatric/Behavioral:  Negative for dysphoric mood. The patient is not nervous/anxious.        Objective:  /68 (BP Location: Left arm, Patient Position: Sitting, Cuff Size: Large)   Pulse 83   Ht 5' 4\" (1.626 m)   Wt 85.3 kg (188 lb)   LMP 05/05/2025 (Approximate)   SpO2 98%   BMI 32.27 kg/m²     Physical Exam  Vitals and nursing note reviewed.   Constitutional:       Appearance: Normal appearance. She is obese.   HENT:      Head: Normocephalic.   Pulmonary:      Effort: Pulmonary effort is normal.     Neurological:      Mental Status: She is oriented to person, place, and time.     Psychiatric:         Mood and Affect: Mood normal.         Behavior: Behavior normal.         Thought Content: Thought content normal.         Judgment: Judgment normal.            Labs   Most recent labs reviewed   Lab Results   Component Value Date     (L) 03/16/2014    SODIUM 138 11/29/2024    K 4.3 11/29/2024     11/29/2024    CO2 27 11/29/2024    ANIONGAP 10 03/16/2014    AGAP 7 11/29/2024    BUN 14 11/29/2024    CREATININE 0.89 11/29/2024    GLUC 103 09/24/2024    GLUF " 82 11/29/2024    CALCIUM 9.1 11/29/2024    AST 19 11/29/2024    ALT 29 11/29/2024    ALKPHOS 68 11/29/2024    TP 7.1 11/29/2024    TBILI 0.36 11/29/2024    EGFR 83 11/29/2024     Lab Results   Component Value Date    HGBA1C 6.0 (H) 11/15/2024     Lab Results   Component Value Date    [id number] 2.806 11/15/2024    TSH 0.74 12/11/2020     Lab Results   Component Value Date    CHOLESTEROL 189 11/15/2024     Lab Results   Component Value Date    HDL 41 (L) 11/15/2024     Lab Results   Component Value Date    TRIG 167 (H) 11/15/2024     Lab Results   Component Value Date    LDLCALC 115 (H) 11/15/2024

## 2025-05-19 NOTE — ASSESSMENT & PLAN NOTE
- Patient is pursuing Conservative Program and follow up visits with medical weight management provider  - Initial weight loss goal of 5-10% weight loss for improved health. Weight loss can improve patient's co-morbid conditions and/or prevent weight-related complications.  - Explained the importance of continuing lifestyle changes in addition to any anti-obesity medications.   - Labs reviewed from 11/2024    General Recommendations:  Nutrition:  Eat breakfast daily.  Do not skip meals.      Food log (ie.) www.[email or web address], sparkpeople.com, loseit.com, calorieking.com, etc.     Practice mindful eating.  Be sure to set aside time to eat, eat slowly, and savor your food.     Hydration:    At least 64oz of water daily.  No sugar sweetened beverages.  No juice (eat the fruit instead).     Exercise:  Studies have shown that the ideal exercise goal is somewhere between 150 to 300 minutes of moderate intensity exercise a week.  Start with exercising 10 minutes every other day and gradually increase physical activity with a goal of at least 150 minutes of moderate intensity exercise a week, divided over at least 3 days a week.  An example of this would be exercising 30 minutes a day, 5 days a week.  Resistance training can increase muscle mass and increase our resting metabolic rate.   FULL BODY resistance training is recommended 2-3 times a week.  Do not do this on consecutive days to allow for muscle recovery.     Aim for a bare minimum 5000 steps, even on days you do not exercise.     Monitoring:   Weigh yourself daily.  If this causes undue stress, then just weigh yourself once a week.  Weigh yourself the same time of the day with the same amount of clothing on.  Preferably this should be done after waking up, before you eat, and with no clothing or minimal clothing on.     Specific Goals:  Discussed her lifestyle and nutrition and she has been doing well on her zepbound 15 mg. She is congratulated on her progress.  She has been doing a cleanse these last few months and has stopped all of her normal medication besides the zepbound. Eating all organic foods and taking supplements, she wants to take a holistic approach to her health maintenance. She is advised to continue to exercise. Be mindful of her protein intake and to let her pcp and neuro know that she has stopped her medications. She has labs ordered as well and it is recommended she get those done as she has complained of increased fatigue since stopping her meds.   Will continue on zepbound 15mg.   Patient denies personal history of pancreatitis. Patient also denies personal and family history of medullary thyroid cancer and multiple endocrine neoplasia type 2 (MEN 2 tumor).

## 2025-05-24 ENCOUNTER — APPOINTMENT (OUTPATIENT)
Dept: LAB | Facility: MEDICAL CENTER | Age: 35
End: 2025-05-24
Payer: COMMERCIAL

## 2025-05-24 DIAGNOSIS — Z79.60 IMMUNODEFICIENCY DUE TO TREATMENT WITH IMMUNOSUPPRESSIVE MEDICATION (HCC): ICD-10-CM

## 2025-05-24 DIAGNOSIS — R73.03 PREDIABETES: ICD-10-CM

## 2025-05-24 DIAGNOSIS — T45.1X5A IMMUNODEFICIENCY DUE TO TREATMENT WITH IMMUNOSUPPRESSIVE MEDICATION (HCC): ICD-10-CM

## 2025-05-24 DIAGNOSIS — G35 MS (MULTIPLE SCLEROSIS) (HCC): ICD-10-CM

## 2025-05-24 DIAGNOSIS — E28.2 POLYCYSTIC OVARIAN SYNDROME: ICD-10-CM

## 2025-05-24 DIAGNOSIS — Z11.1 TUBERCULOSIS SCREENING: ICD-10-CM

## 2025-05-24 DIAGNOSIS — D84.821 IMMUNODEFICIENCY DUE TO TREATMENT WITH IMMUNOSUPPRESSIVE MEDICATION (HCC): ICD-10-CM

## 2025-05-24 DIAGNOSIS — E03.9 ACQUIRED HYPOTHYROIDISM: ICD-10-CM

## 2025-05-24 DIAGNOSIS — Z11.59 ENCOUNTER FOR SCREENING FOR VIRAL DISEASE: ICD-10-CM

## 2025-05-24 LAB
ALBUMIN SERPL BCG-MCNC: 4.7 G/DL (ref 3.5–5)
ALP SERPL-CCNC: 59 U/L (ref 34–104)
ALT SERPL W P-5'-P-CCNC: 16 U/L (ref 7–52)
ANION GAP SERPL CALCULATED.3IONS-SCNC: 9 MMOL/L (ref 4–13)
AST SERPL W P-5'-P-CCNC: 17 U/L (ref 13–39)
BASOPHILS # BLD AUTO: 0.05 THOUSANDS/ÂΜL (ref 0–0.1)
BASOPHILS NFR BLD AUTO: 1 % (ref 0–1)
BILIRUB SERPL-MCNC: 0.37 MG/DL (ref 0.2–1)
BUN SERPL-MCNC: 12 MG/DL (ref 5–25)
CALCIUM SERPL-MCNC: 9.7 MG/DL (ref 8.4–10.2)
CHLORIDE SERPL-SCNC: 105 MMOL/L (ref 96–108)
CHOLEST SERPL-MCNC: 187 MG/DL (ref ?–200)
CO2 SERPL-SCNC: 24 MMOL/L (ref 21–32)
CREAT SERPL-MCNC: 0.8 MG/DL (ref 0.6–1.3)
EOSINOPHIL # BLD AUTO: 0.14 THOUSAND/ÂΜL (ref 0–0.61)
EOSINOPHIL NFR BLD AUTO: 2 % (ref 0–6)
ERYTHROCYTE [DISTWIDTH] IN BLOOD BY AUTOMATED COUNT: 12.3 % (ref 11.6–15.1)
EST. AVERAGE GLUCOSE BLD GHB EST-MCNC: 103 MG/DL
GFR SERPL CREATININE-BSD FRML MDRD: 94 ML/MIN/1.73SQ M
GLUCOSE P FAST SERPL-MCNC: 74 MG/DL (ref 65–99)
HBA1C MFR BLD: 5.2 %
HCG SERPL QL: NEGATIVE
HCT VFR BLD AUTO: 45.7 % (ref 34.8–46.1)
HDLC SERPL-MCNC: 50 MG/DL
HGB BLD-MCNC: 14.9 G/DL (ref 11.5–15.4)
IGA SERPL-MCNC: 331 MG/DL (ref 66–433)
IGG SERPL-MCNC: 931 MG/DL (ref 635–1741)
IGM SERPL-MCNC: 69 MG/DL (ref 45–281)
IMM GRANULOCYTES # BLD AUTO: 0.02 THOUSAND/UL (ref 0–0.2)
IMM GRANULOCYTES NFR BLD AUTO: 0 % (ref 0–2)
LDLC SERPL CALC-MCNC: 119 MG/DL (ref 0–100)
LYMPHOCYTES # BLD AUTO: 1.94 THOUSANDS/ÂΜL (ref 0.6–4.47)
LYMPHOCYTES NFR BLD AUTO: 25 % (ref 14–44)
MCH RBC QN AUTO: 29.7 PG (ref 26.8–34.3)
MCHC RBC AUTO-ENTMCNC: 32.6 G/DL (ref 31.4–37.4)
MCV RBC AUTO: 91 FL (ref 82–98)
MONOCYTES # BLD AUTO: 0.55 THOUSAND/ÂΜL (ref 0.17–1.22)
MONOCYTES NFR BLD AUTO: 7 % (ref 4–12)
NEUTROPHILS # BLD AUTO: 4.99 THOUSANDS/ÂΜL (ref 1.85–7.62)
NEUTS SEG NFR BLD AUTO: 65 % (ref 43–75)
NONHDLC SERPL-MCNC: 137 MG/DL
NRBC BLD AUTO-RTO: 0 /100 WBCS
PLATELET # BLD AUTO: 308 THOUSANDS/UL (ref 149–390)
PMV BLD AUTO: 10 FL (ref 8.9–12.7)
POTASSIUM SERPL-SCNC: 4.2 MMOL/L (ref 3.5–5.3)
PROT SERPL-MCNC: 7.6 G/DL (ref 6.4–8.4)
RBC # BLD AUTO: 5.01 MILLION/UL (ref 3.81–5.12)
SODIUM SERPL-SCNC: 138 MMOL/L (ref 135–147)
TRIGL SERPL-MCNC: 89 MG/DL (ref ?–150)
TSH SERPL DL<=0.05 MIU/L-ACNC: 1.67 UIU/ML (ref 0.45–4.5)
WBC # BLD AUTO: 7.69 THOUSAND/UL (ref 4.31–10.16)

## 2025-05-24 PROCEDURE — 82784 ASSAY IGA/IGD/IGG/IGM EACH: CPT

## 2025-05-24 PROCEDURE — 87389 HIV-1 AG W/HIV-1&-2 AB AG IA: CPT

## 2025-05-24 PROCEDURE — 80061 LIPID PANEL: CPT

## 2025-05-24 PROCEDURE — 80053 COMPREHEN METABOLIC PANEL: CPT

## 2025-05-24 PROCEDURE — 84703 CHORIONIC GONADOTROPIN ASSAY: CPT

## 2025-05-24 PROCEDURE — 86480 TB TEST CELL IMMUN MEASURE: CPT

## 2025-05-24 PROCEDURE — 36415 COLL VENOUS BLD VENIPUNCTURE: CPT

## 2025-05-24 PROCEDURE — 85025 COMPLETE CBC W/AUTO DIFF WBC: CPT

## 2025-05-24 PROCEDURE — 84443 ASSAY THYROID STIM HORMONE: CPT

## 2025-05-24 PROCEDURE — 83036 HEMOGLOBIN GLYCOSYLATED A1C: CPT

## 2025-05-24 PROCEDURE — 80074 ACUTE HEPATITIS PANEL: CPT

## 2025-05-25 LAB
GAMMA INTERFERON BACKGROUND BLD IA-ACNC: 0.04 IU/ML
HAV IGM SER QL: NORMAL
HBV CORE IGM SER QL: NORMAL
HBV SURFACE AG SER QL: NORMAL
HCV AB SER QL: NORMAL
HIV 1+2 AB+HIV1 P24 AG SERPL QL IA: NORMAL
M TB IFN-G BLD-IMP: NEGATIVE
M TB IFN-G CD4+ BCKGRND COR BLD-ACNC: 0.09 IU/ML
M TB IFN-G CD4+ BCKGRND COR BLD-ACNC: 0.13 IU/ML
MITOGEN IGNF BCKGRD COR BLD-ACNC: 9.96 IU/ML

## 2025-06-17 ENCOUNTER — TELEPHONE (OUTPATIENT)
Dept: FAMILY MEDICINE CLINIC | Facility: CLINIC | Age: 35
End: 2025-06-17

## 2025-07-24 ENCOUNTER — TELEPHONE (OUTPATIENT)
Dept: NEUROLOGY | Facility: CLINIC | Age: 35
End: 2025-07-24

## 2025-07-24 NOTE — TELEPHONE ENCOUNTER
LMOM to confirm patient's appointment on 7/30  with Yissel. Attempted to confirm time, date, location.